# Patient Record
Sex: FEMALE | Race: WHITE | Employment: OTHER | URBAN - METROPOLITAN AREA
[De-identification: names, ages, dates, MRNs, and addresses within clinical notes are randomized per-mention and may not be internally consistent; named-entity substitution may affect disease eponyms.]

---

## 2017-05-24 ENCOUNTER — TELEPHONE (OUTPATIENT)
Dept: FAMILY MEDICINE CLINIC | Age: 67
End: 2017-05-24

## 2017-06-06 ENCOUNTER — OFFICE VISIT (OUTPATIENT)
Dept: FAMILY MEDICINE CLINIC | Age: 67
End: 2017-06-06

## 2017-06-06 VITALS
HEIGHT: 65 IN | BODY MASS INDEX: 30.66 KG/M2 | WEIGHT: 184 LBS | HEART RATE: 60 BPM | DIASTOLIC BLOOD PRESSURE: 78 MMHG | SYSTOLIC BLOOD PRESSURE: 136 MMHG

## 2017-06-06 DIAGNOSIS — E78.5 HYPERLIPIDEMIA, UNSPECIFIED HYPERLIPIDEMIA TYPE: ICD-10-CM

## 2017-06-06 DIAGNOSIS — L98.9 SKIN LESION: ICD-10-CM

## 2017-06-06 DIAGNOSIS — Z91.81 AT HIGH RISK FOR FALLS: ICD-10-CM

## 2017-06-06 DIAGNOSIS — I10 ESSENTIAL HYPERTENSION: Primary | ICD-10-CM

## 2017-06-06 PROCEDURE — 99213 OFFICE O/P EST LOW 20 MIN: CPT | Performed by: FAMILY MEDICINE

## 2017-06-06 RX ORDER — LOSARTAN POTASSIUM 50 MG/1
1 TABLET ORAL DAILY
COMMUNITY
Start: 2017-05-16

## 2017-06-06 RX ORDER — ATORVASTATIN CALCIUM 10 MG/1
10 TABLET, FILM COATED ORAL DAILY
Qty: 90 TABLET | Refills: 1 | Status: SHIPPED | OUTPATIENT
Start: 2017-06-06 | End: 2017-12-06 | Stop reason: SDUPTHER

## 2017-06-06 RX ORDER — ATENOLOL 50 MG/1
50 TABLET ORAL DAILY
Qty: 90 TABLET | Refills: 1 | Status: SHIPPED | OUTPATIENT
Start: 2017-06-06 | End: 2017-12-06 | Stop reason: SDUPTHER

## 2017-06-06 ASSESSMENT — ENCOUNTER SYMPTOMS
SHORTNESS OF BREATH: 0
CHEST TIGHTNESS: 0

## 2017-06-07 ENCOUNTER — OFFICE VISIT (OUTPATIENT)
Dept: PHYSICAL MEDICINE AND REHAB | Age: 67
End: 2017-06-07

## 2017-06-07 DIAGNOSIS — S46.011A TRAUMATIC TEAR OF RIGHT ROTATOR CUFF, INITIAL ENCOUNTER: Primary | ICD-10-CM

## 2017-06-07 DIAGNOSIS — R20.2 PARESTHESIA AND PAIN OF BOTH UPPER EXTREMITIES: ICD-10-CM

## 2017-06-07 DIAGNOSIS — G56.03 BILATERAL CARPAL TUNNEL SYNDROME: ICD-10-CM

## 2017-06-07 DIAGNOSIS — M79.602 PARESTHESIA AND PAIN OF BOTH UPPER EXTREMITIES: ICD-10-CM

## 2017-06-07 DIAGNOSIS — M75.41 IMPINGEMENT SYNDROME OF RIGHT SHOULDER: ICD-10-CM

## 2017-06-07 DIAGNOSIS — M79.601 PARESTHESIA AND PAIN OF BOTH UPPER EXTREMITIES: ICD-10-CM

## 2017-06-07 PROCEDURE — 95886 MUSC TEST DONE W/N TEST COMP: CPT | Performed by: PHYSICAL MEDICINE & REHABILITATION

## 2017-06-07 PROCEDURE — 95909 NRV CNDJ TST 5-6 STUDIES: CPT | Performed by: PHYSICAL MEDICINE & REHABILITATION

## 2017-12-06 ENCOUNTER — OFFICE VISIT (OUTPATIENT)
Dept: FAMILY MEDICINE CLINIC | Age: 67
End: 2017-12-06

## 2017-12-06 VITALS
WEIGHT: 189 LBS | SYSTOLIC BLOOD PRESSURE: 110 MMHG | BODY MASS INDEX: 31.49 KG/M2 | HEIGHT: 65 IN | DIASTOLIC BLOOD PRESSURE: 74 MMHG | HEART RATE: 58 BPM

## 2017-12-06 DIAGNOSIS — I10 ESSENTIAL HYPERTENSION: Primary | ICD-10-CM

## 2017-12-06 DIAGNOSIS — R07.9 CHEST PAIN, UNSPECIFIED TYPE: ICD-10-CM

## 2017-12-06 DIAGNOSIS — Z12.39 SCREENING FOR BREAST CANCER: ICD-10-CM

## 2017-12-06 DIAGNOSIS — E78.5 HYPERLIPIDEMIA, UNSPECIFIED HYPERLIPIDEMIA TYPE: ICD-10-CM

## 2017-12-06 DIAGNOSIS — E66.9 OBESITY (BMI 30.0-34.9): ICD-10-CM

## 2017-12-06 PROBLEM — E66.811 OBESITY (BMI 30.0-34.9): Status: ACTIVE | Noted: 2017-12-06

## 2017-12-06 LAB
A/G RATIO: 2 (ref 1.1–2.2)
ALBUMIN SERPL-MCNC: 4.3 G/DL (ref 3.4–5)
ALP BLD-CCNC: 69 U/L (ref 40–129)
ALT SERPL-CCNC: 31 U/L (ref 10–40)
ANION GAP SERPL CALCULATED.3IONS-SCNC: 12 MMOL/L (ref 3–16)
AST SERPL-CCNC: 26 U/L (ref 15–37)
BILIRUB SERPL-MCNC: 1.1 MG/DL (ref 0–1)
BILIRUBIN URINE: NEGATIVE
BLOOD, URINE: NEGATIVE
BUN BLDV-MCNC: 14 MG/DL (ref 7–20)
CALCIUM SERPL-MCNC: 9.6 MG/DL (ref 8.3–10.6)
CHLORIDE BLD-SCNC: 103 MMOL/L (ref 99–110)
CHOLESTEROL, TOTAL: 183 MG/DL (ref 0–199)
CLARITY: CLEAR
CO2: 28 MMOL/L (ref 21–32)
COLOR: YELLOW
CREAT SERPL-MCNC: 0.8 MG/DL (ref 0.6–1.2)
GFR AFRICAN AMERICAN: >60
GFR NON-AFRICAN AMERICAN: >60
GLOBULIN: 2.2 G/DL
GLUCOSE BLD-MCNC: 101 MG/DL (ref 70–99)
GLUCOSE URINE: NEGATIVE MG/DL
HDLC SERPL-MCNC: 60 MG/DL (ref 40–60)
KETONES, URINE: NEGATIVE MG/DL
LDL CHOLESTEROL CALCULATED: 90 MG/DL
LEUKOCYTE ESTERASE, URINE: NEGATIVE
MICROSCOPIC EXAMINATION: NORMAL
NITRITE, URINE: NEGATIVE
PH UA: 7
POTASSIUM SERPL-SCNC: 4.5 MMOL/L (ref 3.5–5.1)
PROTEIN UA: NEGATIVE MG/DL
SODIUM BLD-SCNC: 143 MMOL/L (ref 136–145)
SPECIFIC GRAVITY UA: 1.02
TOTAL PROTEIN: 6.5 G/DL (ref 6.4–8.2)
TRIGL SERPL-MCNC: 167 MG/DL (ref 0–150)
URINE TYPE: NORMAL
UROBILINOGEN, URINE: 0.2 E.U./DL
VLDLC SERPL CALC-MCNC: 33 MG/DL

## 2017-12-06 PROCEDURE — 99214 OFFICE O/P EST MOD 30 MIN: CPT | Performed by: FAMILY MEDICINE

## 2017-12-06 PROCEDURE — 36415 COLL VENOUS BLD VENIPUNCTURE: CPT | Performed by: FAMILY MEDICINE

## 2017-12-06 PROCEDURE — 3288F FALL RISK ASSESSMENT DOCD: CPT | Performed by: FAMILY MEDICINE

## 2017-12-06 PROCEDURE — 93000 ELECTROCARDIOGRAM COMPLETE: CPT | Performed by: FAMILY MEDICINE

## 2017-12-06 PROCEDURE — 81003 URINALYSIS AUTO W/O SCOPE: CPT | Performed by: FAMILY MEDICINE

## 2017-12-06 PROCEDURE — G8510 SCR DEP NEG, NO PLAN REQD: HCPCS | Performed by: FAMILY MEDICINE

## 2017-12-06 RX ORDER — ATORVASTATIN CALCIUM 10 MG/1
10 TABLET, FILM COATED ORAL DAILY
Qty: 90 TABLET | Refills: 1 | Status: SHIPPED | OUTPATIENT
Start: 2017-12-06 | End: 2018-08-09 | Stop reason: SDUPTHER

## 2017-12-06 RX ORDER — ATENOLOL 50 MG/1
50 TABLET ORAL DAILY
Qty: 90 TABLET | Refills: 1 | Status: SHIPPED | OUTPATIENT
Start: 2017-12-06 | End: 2018-06-28 | Stop reason: DRUGHIGH

## 2017-12-06 RX ORDER — NYSTATIN 100000 [USP'U]/G
POWDER TOPICAL
COMMUNITY
Start: 2017-12-01 | End: 2018-05-30 | Stop reason: ALTCHOICE

## 2017-12-06 RX ORDER — CLOBETASOL PROPIONATE 0.5 MG/G
AEROSOL, FOAM TOPICAL
COMMUNITY
Start: 2017-12-01

## 2017-12-06 RX ORDER — KETOCONAZOLE 20 MG/ML
SHAMPOO TOPICAL
COMMUNITY
Start: 2017-12-01

## 2017-12-06 ASSESSMENT — ENCOUNTER SYMPTOMS
SHORTNESS OF BREATH: 0
CHEST TIGHTNESS: 0

## 2017-12-06 ASSESSMENT — PATIENT HEALTH QUESTIONNAIRE - PHQ9
SUM OF ALL RESPONSES TO PHQ QUESTIONS 1-9: 0
1. LITTLE INTEREST OR PLEASURE IN DOING THINGS: 0
SUM OF ALL RESPONSES TO PHQ9 QUESTIONS 1 & 2: 0
2. FEELING DOWN, DEPRESSED OR HOPELESS: 0

## 2017-12-06 NOTE — PROGRESS NOTES
deviation present. No thyromegaly present. Cardiovascular: Normal rate, regular rhythm and normal heart sounds. Pulmonary/Chest: Effort normal and breath sounds normal. No respiratory distress. She has no wheezes. Abdominal: Soft. She exhibits no distension and no mass. There is no tenderness. Musculoskeletal: She exhibits no edema. Right lower leg: She exhibits no edema. Left lower leg: She exhibits no edema. Neurological: She is alert. Psychiatric: She has a normal mood and affect. Vitals:    12/06/17 0815   BP: 110/74   Site: Left Arm   Position: Sitting   Cuff Size: Large Adult   Pulse: 58   Weight: 189 lb (85.7 kg)   Height: 5' 4.5\" (1.638 m)     Body mass index is 31.94 kg/m². Wt Readings from Last 3 Encounters:   12/06/17 189 lb (85.7 kg)   06/06/17 184 lb (83.5 kg)   12/13/16 183 lb 6.4 oz (83.2 kg)     BP Readings from Last 3 Encounters:   12/06/17 110/74   06/06/17 136/78   12/13/16 120/68      The 10-year ASCVD risk score (Emiliana Caballero, et al., 2013) is: 6.8%    Values used to calculate the score:      Age: 75 years      Sex: Female      Is Non- : No      Diabetic: No      Tobacco smoker: No      Systolic Blood Pressure: 742 mmHg      Is BP treated: Yes      HDL Cholesterol: 73 mg/dL      Total Cholesterol: 179 mg/dL  Lab Review   No visits with results within 6 Month(s) from this visit. Latest known visit with results is:   Orders Only on 12/13/2016   Component Date Value    Hyaline Casts, UA 12/13/2016 2     WBC, UA 12/13/2016 4     RBC, UA 12/13/2016 2     Epi Cells 12/13/2016 1      No results found for this visit on 12/06/17. EKG: not change    Assessment:      1. Essential hypertension  COMPREHENSIVE METABOLIC PANEL    URINALYSIS    EKG 12 lead    atenolol (TENORMIN) 50 MG tablet   2. Hyperlipidemia, unspecified hyperlipidemia type  LIPID PANEL    atorvastatin (LIPITOR) 10 MG tablet   3.  Screening for breast cancer  MARYANN Screening

## 2017-12-08 ENCOUNTER — PATIENT MESSAGE (OUTPATIENT)
Dept: FAMILY MEDICINE CLINIC | Age: 67
End: 2017-12-08

## 2018-01-08 ENCOUNTER — HOSPITAL ENCOUNTER (OUTPATIENT)
Dept: WOMENS IMAGING | Age: 68
Discharge: HOME | End: 2018-01-08
Attending: FAMILY MEDICINE | Admitting: FAMILY MEDICINE

## 2018-01-08 DIAGNOSIS — Z12.39 SCREENING FOR BREAST CANCER: ICD-10-CM

## 2018-01-08 DIAGNOSIS — Z12.31 ENCOUNTER FOR SCREENING MAMMOGRAM FOR MALIGNANT NEOPLASM OF BREAST: ICD-10-CM

## 2018-05-30 ENCOUNTER — OFFICE VISIT (OUTPATIENT)
Dept: FAMILY MEDICINE CLINIC | Age: 68
End: 2018-05-30

## 2018-05-30 VITALS
DIASTOLIC BLOOD PRESSURE: 60 MMHG | HEIGHT: 64 IN | WEIGHT: 186.2 LBS | BODY MASS INDEX: 31.79 KG/M2 | SYSTOLIC BLOOD PRESSURE: 120 MMHG | HEART RATE: 53 BPM

## 2018-05-30 DIAGNOSIS — Z78.0 POST-MENOPAUSAL: ICD-10-CM

## 2018-05-30 DIAGNOSIS — I10 ESSENTIAL HYPERTENSION: Primary | ICD-10-CM

## 2018-05-30 DIAGNOSIS — E66.9 OBESITY (BMI 30.0-34.9): ICD-10-CM

## 2018-05-30 DIAGNOSIS — D49.2 ATYPICAL SQUAMOPROLIFERATIVE SKIN LESION: ICD-10-CM

## 2018-05-30 DIAGNOSIS — E78.5 HYPERLIPIDEMIA, UNSPECIFIED HYPERLIPIDEMIA TYPE: ICD-10-CM

## 2018-05-30 PROCEDURE — 99213 OFFICE O/P EST LOW 20 MIN: CPT | Performed by: FAMILY MEDICINE

## 2018-05-30 ASSESSMENT — ENCOUNTER SYMPTOMS
CHEST TIGHTNESS: 0
SHORTNESS OF BREATH: 0

## 2018-06-05 ENCOUNTER — HOSPITAL ENCOUNTER (OUTPATIENT)
Dept: WOMENS IMAGING | Age: 68
Discharge: HOME | End: 2018-06-05
Attending: FAMILY MEDICINE | Admitting: FAMILY MEDICINE

## 2018-06-05 DIAGNOSIS — Z78.0 ASYMPTOMATIC MENOPAUSAL STATE: ICD-10-CM

## 2018-06-05 DIAGNOSIS — Z78.0 POST-MENOPAUSAL: ICD-10-CM

## 2018-06-27 PROBLEM — M85.89 OSTEOPENIA OF MULTIPLE SITES: Status: ACTIVE | Noted: 2018-06-27

## 2018-06-28 ENCOUNTER — OFFICE VISIT (OUTPATIENT)
Dept: FAMILY MEDICINE CLINIC | Age: 68
End: 2018-06-28

## 2018-06-28 VITALS
HEIGHT: 64 IN | BODY MASS INDEX: 31.96 KG/M2 | HEART RATE: 63 BPM | SYSTOLIC BLOOD PRESSURE: 144 MMHG | WEIGHT: 187.2 LBS | DIASTOLIC BLOOD PRESSURE: 68 MMHG

## 2018-06-28 DIAGNOSIS — M85.89 OSTEOPENIA OF MULTIPLE SITES: Primary | ICD-10-CM

## 2018-06-28 DIAGNOSIS — I10 ESSENTIAL HYPERTENSION: ICD-10-CM

## 2018-06-28 PROCEDURE — 99213 OFFICE O/P EST LOW 20 MIN: CPT | Performed by: FAMILY MEDICINE

## 2018-06-28 RX ORDER — ATENOLOL 100 MG/1
100 TABLET ORAL DAILY
Qty: 30 TABLET | Refills: 11 | Status: SHIPPED | OUTPATIENT
Start: 2018-06-28 | End: 2019-02-07 | Stop reason: SDUPTHER

## 2018-06-28 ASSESSMENT — ENCOUNTER SYMPTOMS
CHEST TIGHTNESS: 0
SHORTNESS OF BREATH: 0

## 2018-08-09 ENCOUNTER — OFFICE VISIT (OUTPATIENT)
Dept: FAMILY MEDICINE CLINIC | Age: 68
End: 2018-08-09

## 2018-08-09 VITALS
WEIGHT: 188.4 LBS | HEIGHT: 64 IN | HEART RATE: 55 BPM | BODY MASS INDEX: 32.17 KG/M2 | SYSTOLIC BLOOD PRESSURE: 110 MMHG | DIASTOLIC BLOOD PRESSURE: 70 MMHG

## 2018-08-09 DIAGNOSIS — I10 ESSENTIAL HYPERTENSION: Primary | ICD-10-CM

## 2018-08-09 DIAGNOSIS — I25.10 CORONARY ARTERY DISEASE INVOLVING NATIVE CORONARY ARTERY OF NATIVE HEART WITHOUT ANGINA PECTORIS: ICD-10-CM

## 2018-08-09 DIAGNOSIS — E78.5 HYPERLIPIDEMIA, UNSPECIFIED HYPERLIPIDEMIA TYPE: ICD-10-CM

## 2018-08-09 DIAGNOSIS — E66.9 OBESITY (BMI 30.0-34.9): ICD-10-CM

## 2018-08-09 PROCEDURE — 99213 OFFICE O/P EST LOW 20 MIN: CPT | Performed by: FAMILY MEDICINE

## 2018-08-09 RX ORDER — ATORVASTATIN CALCIUM 10 MG/1
10 TABLET, FILM COATED ORAL DAILY
Qty: 90 TABLET | Refills: 3 | Status: SHIPPED | OUTPATIENT
Start: 2018-08-09 | End: 2019-08-07 | Stop reason: SDUPTHER

## 2018-08-09 RX ORDER — NITROGLYCERIN 0.3 MG/1
0.3 TABLET SUBLINGUAL EVERY 5 MIN PRN
Qty: 25 TABLET | Refills: 0 | Status: SHIPPED | OUTPATIENT
Start: 2018-08-09 | End: 2022-07-14 | Stop reason: SDUPTHER

## 2018-08-09 ASSESSMENT — ENCOUNTER SYMPTOMS: SHORTNESS OF BREATH: 0

## 2018-08-09 NOTE — PROGRESS NOTES
Relation Age of Onset    Stroke Father     Coronary Art Dis Father     Heart Attack Brother 72       Current Outpatient Prescriptions on File Prior to Visit   Medication Sig Dispense Refill    Calcium Carb-Cholecalciferol (CALTRATE 600+D) 600-800 MG-UNIT TABS 1 tab twice per day 60 tablet 11    atenolol (TENORMIN) 100 MG tablet Take 1 tablet by mouth daily 30 tablet 11    ciclopirox (LOPROX) 0.77 % cream       clobetasol (OLUX) 0.05 % foam       ketoconazole (NIZORAL) 2 % shampoo       losartan (COZAAR) 50 MG tablet Take 1 tablet by mouth daily      aspirin 81 MG tablet Take 81 mg by mouth daily.  Multiple Vitamin (MULTIVITAMIN PO) Take  by mouth daily. Current Facility-Administered Medications on File Prior to Visit   Medication Dose Route Frequency Provider Last Rate Last Dose    promethazine (PHENERGAN) injection 25 mg  25 mg Intramuscular Q6H PRN Ryan Chang MD   25 mg at 12/17/14 1156                   Objective:   Physical Exam   Constitutional: She appears well-developed and well-nourished. Obese   HENT:   Head: Normocephalic and atraumatic. Neck: Neck supple. Cardiovascular: Normal rate, regular rhythm and normal heart sounds. Pulmonary/Chest: Effort normal and breath sounds normal. No respiratory distress. She has no wheezes. Neurological: She is alert. Psychiatric: She has a normal mood and affect. Vitals:    08/09/18 0956   BP: 110/70   Site: Left Arm   Position: Sitting   Cuff Size: Large Adult   Pulse: 55   Weight: 188 lb 6.4 oz (85.5 kg)   Height: 5' 4\" (1.626 m)     Body mass index is 32.34 kg/m². Wt Readings from Last 3 Encounters:   08/09/18 188 lb 6.4 oz (85.5 kg)   06/28/18 187 lb 3.2 oz (84.9 kg)   05/30/18 186 lb 3.2 oz (84.5 kg)     BP Readings from Last 3 Encounters:   08/09/18 110/70   06/28/18 (!) 144/68   05/30/18 120/60          No results found for this visit on 08/09/18.   The 10-year ASCVD risk score (Ro Nava., et al., 2013) is: 7.2% Values used to calculate the score:      Age: 75 years      Sex: Female      Is Non- : No      Diabetic: No      Tobacco smoker: No      Systolic Blood Pressure: 735 mmHg      Is BP treated: Yes      HDL Cholesterol: 60 mg/dL      Total Cholesterol: 183 mg/dL  Lab Review   No visits with results within 6 Month(s) from this visit. Latest known visit with results is:   Office Visit on 12/06/2017   Component Date Value    Sodium 12/06/2017 143     Potassium 12/06/2017 4.5     Chloride 12/06/2017 103     CO2 12/06/2017 28     Anion Gap 12/06/2017 12     Glucose 12/06/2017 101*    BUN 12/06/2017 14     CREATININE 12/06/2017 0.8     GFR Non- 12/06/2017 >60     GFR  12/06/2017 >60     Calcium 12/06/2017 9.6     Total Protein 12/06/2017 6.5     Alb 12/06/2017 4.3     Albumin/Globulin Ratio 12/06/2017 2.0     Total Bilirubin 12/06/2017 1.1*    Alkaline Phosphatase 12/06/2017 69     ALT 12/06/2017 31     AST 12/06/2017 26     Globulin 12/06/2017 2.2     Cholesterol, Total 12/06/2017 183     Triglycerides 12/06/2017 167*    HDL 12/06/2017 60     LDL Calculated 12/06/2017 90     VLDL Cholesterol Calcula* 12/06/2017 33     Color, UA 12/06/2017 YELLOW     Clarity, UA 12/06/2017 Clear     Glucose, Ur 12/06/2017 Negative     Bilirubin Urine 12/06/2017 Negative     Ketones, Urine 12/06/2017 Negative     Specific Gravity, UA 12/06/2017 1.018     Blood, Urine 12/06/2017 Negative     pH, UA 12/06/2017 7.0     Protein, UA 12/06/2017 Negative     Urobilinogen, Urine 12/06/2017 0.2     Nitrite, Urine 12/06/2017 Negative     Leukocyte Esterase, Urine 12/06/2017 Negative     Microscopic Examination 12/06/2017 Not Indicated     Urine Type 12/06/2017 Clean catch            Assessment:       Diagnosis Orders   1. Essential hypertension     2. Hyperlipidemia, unspecified hyperlipidemia type  atorvastatin (LIPITOR) 10 MG tablet   3.  Obesity (BMI

## 2018-08-27 ENCOUNTER — HOSPITAL ENCOUNTER (OUTPATIENT)
Dept: CT IMAGING | Age: 68
Discharge: HOME | End: 2018-08-27
Attending: FAMILY MEDICINE | Admitting: FAMILY MEDICINE

## 2018-08-27 ENCOUNTER — OFFICE VISIT (OUTPATIENT)
Dept: FAMILY MEDICINE CLINIC | Age: 68
End: 2018-08-27

## 2018-08-27 VITALS
DIASTOLIC BLOOD PRESSURE: 84 MMHG | SYSTOLIC BLOOD PRESSURE: 122 MMHG | WEIGHT: 185.8 LBS | HEART RATE: 62 BPM | BODY MASS INDEX: 31.89 KG/M2

## 2018-08-27 DIAGNOSIS — R07.89 CHEST TIGHTNESS: Primary | ICD-10-CM

## 2018-08-27 DIAGNOSIS — K92.1 BLOOD IN STOOL: ICD-10-CM

## 2018-08-27 DIAGNOSIS — R10.9 ABDOMINAL PAIN, RIGHT LATERAL: ICD-10-CM

## 2018-08-27 DIAGNOSIS — R07.89 OTHER CHEST PAIN: ICD-10-CM

## 2018-08-27 DIAGNOSIS — R10.9 STOMACH ACHE: ICD-10-CM

## 2018-08-27 DIAGNOSIS — R21 RASH: ICD-10-CM

## 2018-08-27 LAB
ALBUMIN SERPL-MCNC: 4.1 GM/DL (ref 3.4–5)
ALP BLD-CCNC: 65 IU/L (ref 40–128)
ALT SERPL-CCNC: 37 U/L (ref 10–40)
AMYLASE: 55 U/L (ref 25–115)
ANION GAP SERPL CALCULATED.3IONS-SCNC: 14 MMOL/L (ref 4–16)
AST SERPL-CCNC: 32 IU/L (ref 15–37)
BASOPHILS ABSOLUTE: 0.1 K/CU MM
BASOPHILS RELATIVE PERCENT: 1.4 % (ref 0–1)
BILIRUB SERPL-MCNC: 1 MG/DL (ref 0–1)
BUN BLDV-MCNC: 17 MG/DL (ref 6–23)
CALCIUM SERPL-MCNC: 10.2 MG/DL (ref 8.3–10.6)
CHLORIDE BLD-SCNC: 97 MMOL/L (ref 99–110)
CO2: 27 MMOL/L (ref 21–32)
CREAT SERPL-MCNC: 0.9 MG/DL (ref 0.6–1.1)
DIFFERENTIAL TYPE: ABNORMAL
EOSINOPHILS ABSOLUTE: 0.3 K/CU MM
EOSINOPHILS RELATIVE PERCENT: 3.1 % (ref 0–3)
GFR AFRICAN AMERICAN: >60 ML/MIN/1.73M2
GFR AFRICAN AMERICAN: >60 ML/MIN/1.73M2
GFR NON-AFRICAN AMERICAN: 52 ML/MIN/1.73M2
GFR NON-AFRICAN AMERICAN: >60 ML/MIN/1.73M2
GLUCOSE BLD-MCNC: 80 MG/DL (ref 70–99)
HCT VFR BLD CALC: 39.9 % (ref 37–47)
HEMOGLOBIN: 13.1 GM/DL (ref 12.5–16)
IMMATURE NEUTROPHIL %: 0.4 % (ref 0–0.43)
LIPASE: 54 IU/L (ref 13–60)
LYMPHOCYTES ABSOLUTE: 2.6 K/CU MM
LYMPHOCYTES RELATIVE PERCENT: 27.7 % (ref 24–44)
MCH RBC QN AUTO: 31.6 PG (ref 27–31)
MCHC RBC AUTO-ENTMCNC: 32.8 % (ref 32–36)
MCV RBC AUTO: 96.4 FL (ref 78–100)
MONOCYTES ABSOLUTE: 0.7 K/CU MM
MONOCYTES RELATIVE PERCENT: 8 % (ref 0–4)
NUCLEATED RBC %: 0 %
PDW BLD-RTO: 13.9 % (ref 11.7–14.9)
PLATELET # BLD: 322 K/CU MM (ref 140–440)
PMV BLD AUTO: 10.2 FL (ref 7.5–11.1)
POC CREATININE: 1.1 MG/DL (ref 0.6–1.1)
POTASSIUM SERPL-SCNC: 4.5 MMOL/L (ref 3.5–5.1)
RBC # BLD: 4.14 M/CU MM (ref 4.2–5.4)
SEGMENTED NEUTROPHILS ABSOLUTE COUNT: 5.5 K/CU MM
SEGMENTED NEUTROPHILS RELATIVE PERCENT: 59.4 % (ref 36–66)
SODIUM BLD-SCNC: 138 MMOL/L (ref 135–145)
TOTAL IMMATURE NEUTOROPHIL: 0.04 K/CU MM
TOTAL NUCLEATED RBC: 0 K/CU MM
TOTAL PROTEIN: 6.2 GM/DL (ref 6.4–8.2)
WBC # BLD: 9.3 K/CU MM (ref 4–10.5)

## 2018-08-27 PROCEDURE — 99214 OFFICE O/P EST MOD 30 MIN: CPT | Performed by: FAMILY MEDICINE

## 2018-08-27 PROCEDURE — 93000 ELECTROCARDIOGRAM COMPLETE: CPT | Performed by: FAMILY MEDICINE

## 2018-08-27 RX ORDER — SODIUM CHLORIDE 0.9 % (FLUSH) 0.9 %
10 SYRINGE (ML) INJECTION ONCE
Status: COMPLETED | OUTPATIENT
Start: 2018-08-27 | End: 2018-08-27

## 2018-08-27 RX ORDER — SODIUM CHLORIDE 9 MG/ML
INJECTION, SOLUTION INTRAVENOUS CONTINUOUS
Status: DISCONTINUED | OUTPATIENT
Start: 2018-08-27 | End: 2018-08-27

## 2018-08-27 RX ADMIN — Medication 10 ML: at 14:36

## 2018-08-27 ASSESSMENT — ENCOUNTER SYMPTOMS
SHORTNESS OF BREATH: 0
ABDOMINAL PAIN: 1
BLOOD IN STOOL: 1

## 2018-08-27 NOTE — PROGRESS NOTES
daily 90 tablet 3    Calcium Carb-Cholecalciferol (CALTRATE 600+D) 600-800 MG-UNIT TABS 1 tab twice per day 60 tablet 11    atenolol (TENORMIN) 100 MG tablet Take 1 tablet by mouth daily 30 tablet 11    ciclopirox (LOPROX) 0.77 % cream       clobetasol (OLUX) 0.05 % foam       ketoconazole (NIZORAL) 2 % shampoo       losartan (COZAAR) 50 MG tablet Take 1 tablet by mouth daily      aspirin 81 MG tablet Take 81 mg by mouth daily.  Multiple Vitamin (MULTIVITAMIN PO) Take  by mouth daily. Current Facility-Administered Medications on File Prior to Visit   Medication Dose Route Frequency Provider Last Rate Last Dose    promethazine (PHENERGAN) injection 25 mg  25 mg Intramuscular Q6H PRN Susan Alatorre MD   25 mg at 12/17/14 1156                   Objective:   Physical Exam   Constitutional: She appears well-developed and well-nourished. Obese   HENT:   Head: Normocephalic and atraumatic. Neck: Neck supple. No tracheal deviation present. No thyromegaly present. Cardiovascular: Normal rate, regular rhythm and normal heart sounds. Pulmonary/Chest: Effort normal and breath sounds normal. No respiratory distress. She has no wheezes. Abdominal: Soft. She exhibits no distension and no mass. There is tenderness. Musculoskeletal: She exhibits no edema. Neurological: She is alert. Skin:        Psychiatric: She has a normal mood and affect. Vitals:    08/27/18 1022   BP: 122/84   Pulse: 62   Weight: 185 lb 12.8 oz (84.3 kg)     Body mass index is 31.89 kg/m². Wt Readings from Last 3 Encounters:   08/27/18 185 lb 12.8 oz (84.3 kg)   08/09/18 188 lb 6.4 oz (85.5 kg)   06/28/18 187 lb 3.2 oz (84.9 kg)     BP Readings from Last 3 Encounters:   08/27/18 122/84   08/09/18 110/70   06/28/18 (!) 144/68          No results found for this visit on 08/27/18.   The 10-year ASCVD risk score (Idalmis Tao et al., 2013) is: 8.8%    Values used to calculate the score:      Age: 75 years      Sex: CONTRAST    CT ABDOMEN PELVIS W WO CONTRAST Additional Contrast? Radiologist Recommendation    Creatinine    COMPREHENSIVE METABOLIC PANEL    AMYLASE    LIPASE    CBC   3. Blood in stool  CT CHEST W WO CONTRAST    CT ABDOMEN PELVIS W WO CONTRAST Additional Contrast? Radiologist Recommendation    COMPREHENSIVE METABOLIC PANEL    LIPASE    CBC   4.  Rash             Plan:      Could be early shingles, but rash not distinct enough to be labeled as such    Blood in stool could be from hemorrhoids as patient reports    RE-check in 2 days

## 2018-08-28 ENCOUNTER — TELEPHONE (OUTPATIENT)
Dept: FAMILY MEDICINE CLINIC | Age: 68
End: 2018-08-28

## 2018-08-29 ENCOUNTER — OFFICE VISIT (OUTPATIENT)
Dept: FAMILY MEDICINE CLINIC | Age: 68
End: 2018-08-29

## 2018-08-29 VITALS
DIASTOLIC BLOOD PRESSURE: 70 MMHG | WEIGHT: 185 LBS | SYSTOLIC BLOOD PRESSURE: 112 MMHG | BODY MASS INDEX: 31.76 KG/M2 | HEART RATE: 62 BPM

## 2018-08-29 DIAGNOSIS — B02.9 HERPES ZOSTER WITHOUT COMPLICATION: Primary | ICD-10-CM

## 2018-08-29 DIAGNOSIS — R10.11 RIGHT UPPER QUADRANT ABDOMINAL PAIN: ICD-10-CM

## 2018-08-29 PROCEDURE — 99213 OFFICE O/P EST LOW 20 MIN: CPT | Performed by: FAMILY MEDICINE

## 2018-08-29 RX ORDER — GABAPENTIN 100 MG/1
100 CAPSULE ORAL 3 TIMES DAILY
Qty: 90 CAPSULE | Refills: 0 | Status: SHIPPED | OUTPATIENT
Start: 2018-08-29 | End: 2019-02-07 | Stop reason: ALTCHOICE

## 2018-08-29 NOTE — PROGRESS NOTES
Patient ID: Bossman Friedman [id number]        HPI Abdominal pain: Ongoing for about a week right side of abdomen going into the right chest and into the upper back. Sharp. Pain is severe. Not associated with eating. the stools which she attributes to  Pain in her abdomen is a burning pain. It is sharp.     Rash: on right lower abdomen. Red. Burning. Radiates to the right back. There for a week. Did have a shingles vaccine years ago    Review of Systems   Constitutional: Positive for activity change. Gastrointestinal: Positive for abdominal pain. Skin: Positive for color change and rash. Patient Active Problem List   Diagnosis    Essential hypertension    Hyperlipidemia    History of colon polyps    Obesity (BMI 30.0-34. 9)    Osteopenia of multiple sites    Coronary artery disease involving native coronary artery of native heart without angina pectoris       Past Medical History:   Diagnosis Date    Colonic polyp     Hyperlipidemia 2009    Hypertension        Past Surgical History:   Procedure Laterality Date    CARDIOVASCULAR STRESS TEST  2009    treadmill, Dr. Liyah Mora      right     SECTION      COLONOSCOPY  2009 [facility]    COLONOSCOPY  2015    diverticulosis, internal hemorroids.  repeat 5 years. [place] Lama    ROTATOR CUFF REPAIR Right 2016    TUBAL LIGATION         Family History   Problem Relation Age of Onset    Stroke Father     Coronary Art Dis Father     Heart Attack Brother 72       Current Outpatient Prescriptions on File Prior to Visit   Medication Sig Dispense Refill    nitroGLYCERIN (NITROSTAT) 0.3 MG SL tablet Place 1 tablet under the tongue every 5 minutes as needed for Chest pain 25 tablet 0    atorvastatin (LIPITOR) 10 MG tablet Take 1 tablet by mouth daily 90 tablet 3    Calcium Carb-Cholecalciferol (CALTRATE 600+D) 600-800 MG-UNIT TABS 1 tab twice per day 60 tablet 11    atenolol (TENORMIN) 100 MG tablet Take 1 tablet by mouth daily 30 tablet 11    ciclopirox (LOPROX) 0.77 % cream       clobetasol (OLUX) 0.05 % foam       ketoconazole (NIZORAL) 2 % shampoo       losartan (COZAAR) 50 MG tablet Take 1 tablet by mouth daily      aspirin 81 MG tablet Take 81 mg by mouth daily.  Multiple Vitamin (MULTIVITAMIN PO) Take  by mouth daily. Current Facility-Administered Medications on File Prior to Visit   Medication Dose Route Frequency Provider Last Rate Last Dose    promethazine (PHENERGAN) injection 25 mg  25 mg Intramuscular Q6H PRN Kirke Mortimer, MD   25 mg at 12/17/14 1156                   Objective:   Physical Exam   Constitutional: She appears well-developed. No distress. Neurological:   No facial droop. Moving all extremities   Skin:        Psychiatric: Her mood appears anxious. Cognition and memory are normal.        1. No acute intrathoracic, abdominal or pelvic abnormality. 2. Nonobstructing bilateral renal calculi and bilateral renal cysts.    Lab Review   Hospital Outpatient Visit on 08/27/2018   Component Date Value    Amylase 08/27/2018 55     WBC 08/27/2018 9.3     RBC 08/27/2018 4.14*    Hemoglobin 08/27/2018 13.1     Hematocrit 08/27/2018 39.9     MCV 08/27/2018 96.4     MCH 08/27/2018 31.6*    MCHC 08/27/2018 32.8     RDW 08/27/2018 13.9     Platelets 88/13/2039 322     MPV 08/27/2018 10.2     Differential Type 08/27/2018 AUTOMATED DIFFERENTIAL     Segs Relative 08/27/2018 59.4     Lymphocytes % 08/27/2018 27.7     Monocytes % 08/27/2018 8.0*    Eosinophils % 08/27/2018 3.1*    Basophils % 08/27/2018 1.4*    Segs Absolute 08/27/2018 5.5     Lymphocytes # 08/27/2018 2.6     Monocytes # 08/27/2018 0.7     Eosinophils # 08/27/2018 0.3     Basophils # 08/27/2018 0.1     Nucleated RBC % 08/27/2018 0.0     Total Nucleated RBC 08/27/2018 0.0     Total Immature Neutrophil 08/27/2018 0.04     Immature Neutrophil % 08/27/2018 0.4     Sodium 08/27/2018 138     Potassium 08/27/2018 4.5     Chloride 08/27/2018 97*    CO2 08/27/2018 27     BUN 08/27/2018 17     CREATININE 08/27/2018 0.9     Glucose 08/27/2018 80     Calcium 08/27/2018 10.2     Alb 08/27/2018 4.1     Total Protein 08/27/2018 6.2*    Total Bilirubin 08/27/2018 1.0     ALT 08/27/2018 37     AST 08/27/2018 32     Alkaline Phosphatase 08/27/2018 65     GFR Non- 08/27/2018 >60     GFR  08/27/2018 >60     Anion Gap 08/27/2018 14     Lipase 08/27/2018 54     POC Creatinine 08/27/2018 1.1     GFR Non- 08/27/2018 52*    GFR  08/27/2018 >60          Vitals:    08/29/18 0822   BP: 112/70   Pulse: 62   Weight: 185 lb (83.9 kg)     Body mass index is 31.76 kg/m². Wt Readings from Last 3 Encounters:   08/29/18 185 lb (83.9 kg)   08/27/18 185 lb 12.8 oz (84.3 kg)   08/09/18 188 lb 6.4 oz (85.5 kg)     BP Readings from Last 3 Encounters:   08/29/18 112/70   08/27/18 122/84   08/09/18 110/70          No results found for this visit on 08/29/18. Assessment:       Diagnosis Orders   1. Herpes zoster without complication  gabapentin (NEURONTIN) 100 MG capsule   2. Right upper quadrant abdominal pain               Plan: Will be contagious until she has crusted over. Avoid contact with small infants.

## 2018-08-29 NOTE — PATIENT INSTRUCTIONS
Patient Education        Shingles: Care Instructions  Your Care Instructions    Shingles (herpes zoster) causes pain and a blistered rash. The rash can appear anywhere on the body but will be on only one side of the body, the left or right. It will be in a band, a strip, or a small area. The pain can be very severe. Shingles can also cause tingling or itching in the area of the rash. The blisters scab over after a few days and heal in 2 to 4 weeks. Medicines can help you feel better and may help prevent more serious problems caused by shingles. Shingles is caused by the same virus that causes chickenpox. When you have chickenpox, the virus gets into your nerve roots and stays there (becomes dormant) long after you get over the chickenpox. If the virus becomes active again, it can cause shingles. Follow-up care is a key part of your treatment and safety. Be sure to make and go to all appointments, and call your doctor if you are having problems. It's also a good idea to know your test results and keep a list of the medicines you take. How can you care for yourself at home? · Be safe with medicines. Take your medicines exactly as prescribed. Call your doctor if you think you are having a problem with your medicine. Antiviral medicine helps you get better faster. · Try not to scratch or pick at the blisters. They will crust over and fall off on their own if you leave them alone. · Put cool, wet cloths on the area to relieve pain and itching. You can also use calamine lotion. Try not to use so much lotion that it cakes and is hard to get off. · Put cornstarch or baking soda on the sores to help dry them out so they heal faster. · Do not use thick ointment, such as petroleum jelly, on the sores. This will keep them from drying and healing. · To help remove loose crusts, soak them in tap water. This can help decrease oozing, and dry and soothe the skin.   · Take an over-the-counter pain medicine, such as acetaminophen (Tylenol), ibuprofen (Advil, Motrin), or naproxen (Aleve). Read and follow all instructions on the label. · Avoid close contact with people until the blisters have healed. It is very important for you to avoid contact with anyone who has never had chickenpox or the chickenpox vaccine. Pregnant women, young babies, and anyone else who has a hard time fighting infection (such as someone with HIV, diabetes, or cancer) is especially at risk. When should you call for help? Call your doctor now or seek immediate medical care if:    · You have a new or higher fever.     · You have a severe headache and a stiff neck.     · You lose the ability to think clearly.     · The rash spreads to your forehead, nose, eyes, or eyelids.     · You have eye pain, or your vision gets worse.     · You have new pain in your face, or you cannot move the muscles in your face.     · Blisters spread to new parts of your body.    Watch closely for changes in your health, and be sure to contact your doctor if:    · The rash has not healed after 2 to 4 weeks.     · You still have pain after the rash has healed. Where can you learn more? Go to [email or web address] and sign in to your [employer] account. Loni Brooks in the [employer] box to learn more about \"Shingles: Care Instructions. \"     If you do not have an account, please click on the \"Sign Up Now\" link. Current as of: November 18, 2017  Content Version: 11.7  © 0174-1327 [employer]. Care instructions adapted under license by [facility] ([place]). If you have questions about a medical condition or this instruction, always ask your healthcare professional. [address] any warranty or liability for your use of this information.

## 2018-08-30 ASSESSMENT — ENCOUNTER SYMPTOMS
ABDOMINAL PAIN: 1
COLOR CHANGE: 1

## 2018-10-08 ENCOUNTER — NURSE ONLY (OUTPATIENT)
Dept: FAMILY MEDICINE CLINIC | Age: 68
End: 2018-10-08
Payer: MEDICARE

## 2018-10-08 DIAGNOSIS — Z23 NEED FOR INFLUENZA VACCINATION: Primary | ICD-10-CM

## 2018-10-08 PROCEDURE — 90662 IIV NO PRSV INCREASED AG IM: CPT | Performed by: FAMILY MEDICINE

## 2018-10-08 PROCEDURE — G0008 ADMIN INFLUENZA VIRUS VAC: HCPCS | Performed by: FAMILY MEDICINE

## 2019-02-07 ENCOUNTER — OFFICE VISIT (OUTPATIENT)
Dept: FAMILY MEDICINE CLINIC | Age: 69
End: 2019-02-07
Payer: MEDICARE

## 2019-02-07 VITALS
SYSTOLIC BLOOD PRESSURE: 138 MMHG | HEIGHT: 64 IN | WEIGHT: 181.2 LBS | HEART RATE: 50 BPM | BODY MASS INDEX: 30.93 KG/M2 | DIASTOLIC BLOOD PRESSURE: 72 MMHG

## 2019-02-07 DIAGNOSIS — E66.9 OBESITY (BMI 30.0-34.9): ICD-10-CM

## 2019-02-07 DIAGNOSIS — I10 ESSENTIAL HYPERTENSION: ICD-10-CM

## 2019-02-07 DIAGNOSIS — M85.89 OSTEOPENIA OF MULTIPLE SITES: ICD-10-CM

## 2019-02-07 DIAGNOSIS — I25.10 CORONARY ARTERY DISEASE INVOLVING NATIVE CORONARY ARTERY OF NATIVE HEART WITHOUT ANGINA PECTORIS: Primary | ICD-10-CM

## 2019-02-07 LAB
A/G RATIO: 1.8 (ref 1.1–2.2)
ALBUMIN SERPL-MCNC: 4.8 G/DL (ref 3.4–5)
ALP BLD-CCNC: 58 U/L (ref 40–129)
ALT SERPL-CCNC: 31 U/L (ref 10–40)
ANION GAP SERPL CALCULATED.3IONS-SCNC: 15 MMOL/L (ref 3–16)
AST SERPL-CCNC: 31 U/L (ref 15–37)
BILIRUB SERPL-MCNC: 1.3 MG/DL (ref 0–1)
BILIRUBIN URINE: NEGATIVE
BLOOD, URINE: NEGATIVE
BUN BLDV-MCNC: 21 MG/DL (ref 7–20)
CALCIUM SERPL-MCNC: 10.2 MG/DL (ref 8.3–10.6)
CHLORIDE BLD-SCNC: 98 MMOL/L (ref 99–110)
CLARITY: CLEAR
CO2: 26 MMOL/L (ref 21–32)
COLOR: YELLOW
CREAT SERPL-MCNC: 0.9 MG/DL (ref 0.6–1.2)
GFR AFRICAN AMERICAN: >60
GFR NON-AFRICAN AMERICAN: >60
GLOBULIN: 2.6 G/DL
GLUCOSE BLD-MCNC: 94 MG/DL (ref 70–99)
GLUCOSE URINE: NEGATIVE MG/DL
KETONES, URINE: NEGATIVE MG/DL
LEUKOCYTE ESTERASE, URINE: NEGATIVE
MICROSCOPIC EXAMINATION: NORMAL
NITRITE, URINE: NEGATIVE
PH UA: 6
POTASSIUM SERPL-SCNC: 4.3 MMOL/L (ref 3.5–5.1)
PROTEIN UA: NEGATIVE MG/DL
SODIUM BLD-SCNC: 139 MMOL/L (ref 136–145)
SPECIFIC GRAVITY UA: 1.01
T4 FREE: 1 NG/DL (ref 0.9–1.8)
TOTAL PROTEIN: 7.4 G/DL (ref 6.4–8.2)
TSH REFLEX: 5.89 UIU/ML (ref 0.27–4.2)
URINE TYPE: NORMAL
UROBILINOGEN, URINE: 0.2 E.U./DL

## 2019-02-07 PROCEDURE — 36415 COLL VENOUS BLD VENIPUNCTURE: CPT | Performed by: FAMILY MEDICINE

## 2019-02-07 PROCEDURE — G8510 SCR DEP NEG, NO PLAN REQD: HCPCS | Performed by: FAMILY MEDICINE

## 2019-02-07 PROCEDURE — 3288F FALL RISK ASSESSMENT DOCD: CPT | Performed by: FAMILY MEDICINE

## 2019-02-07 PROCEDURE — 99213 OFFICE O/P EST LOW 20 MIN: CPT | Performed by: FAMILY MEDICINE

## 2019-02-07 PROCEDURE — 81003 URINALYSIS AUTO W/O SCOPE: CPT | Performed by: FAMILY MEDICINE

## 2019-02-07 RX ORDER — TRIAMCINOLONE ACETONIDE 1 MG/G
CREAM TOPICAL
COMMUNITY
Start: 2018-12-10 | End: 2022-02-11

## 2019-02-07 RX ORDER — ATENOLOL 100 MG/1
100 TABLET ORAL DAILY
Qty: 30 TABLET | Refills: 11 | Status: SHIPPED | OUTPATIENT
Start: 2019-02-07 | End: 2020-01-29 | Stop reason: SDUPTHER

## 2019-02-07 ASSESSMENT — PATIENT HEALTH QUESTIONNAIRE - PHQ9
SUM OF ALL RESPONSES TO PHQ QUESTIONS 1-9: 0
1. LITTLE INTEREST OR PLEASURE IN DOING THINGS: 0
SUM OF ALL RESPONSES TO PHQ9 QUESTIONS 1 & 2: 0
2. FEELING DOWN, DEPRESSED OR HOPELESS: 0
SUM OF ALL RESPONSES TO PHQ QUESTIONS 1-9: 0

## 2019-02-07 ASSESSMENT — ENCOUNTER SYMPTOMS
SHORTNESS OF BREATH: 0
CHEST TIGHTNESS: 0

## 2019-08-07 ENCOUNTER — OFFICE VISIT (OUTPATIENT)
Dept: FAMILY MEDICINE CLINIC | Age: 69
End: 2019-08-07
Payer: MEDICARE

## 2019-08-07 VITALS
DIASTOLIC BLOOD PRESSURE: 84 MMHG | SYSTOLIC BLOOD PRESSURE: 122 MMHG | HEART RATE: 61 BPM | WEIGHT: 176.4 LBS | BODY MASS INDEX: 30.11 KG/M2 | HEIGHT: 64 IN

## 2019-08-07 DIAGNOSIS — Z23 NEED FOR TETANUS BOOSTER: ICD-10-CM

## 2019-08-07 DIAGNOSIS — I25.10 CORONARY ARTERY DISEASE INVOLVING NATIVE CORONARY ARTERY OF NATIVE HEART WITHOUT ANGINA PECTORIS: ICD-10-CM

## 2019-08-07 DIAGNOSIS — I10 ESSENTIAL HYPERTENSION: Primary | ICD-10-CM

## 2019-08-07 DIAGNOSIS — R10.11 RUQ ABDOMINAL PAIN: ICD-10-CM

## 2019-08-07 DIAGNOSIS — E78.5 HYPERLIPIDEMIA, UNSPECIFIED HYPERLIPIDEMIA TYPE: ICD-10-CM

## 2019-08-07 PROCEDURE — 90715 TDAP VACCINE 7 YRS/> IM: CPT | Performed by: FAMILY MEDICINE

## 2019-08-07 PROCEDURE — 99213 OFFICE O/P EST LOW 20 MIN: CPT | Performed by: FAMILY MEDICINE

## 2019-08-07 PROCEDURE — 90471 IMMUNIZATION ADMIN: CPT | Performed by: FAMILY MEDICINE

## 2019-08-07 RX ORDER — ATORVASTATIN CALCIUM 10 MG/1
10 TABLET, FILM COATED ORAL DAILY
Qty: 90 TABLET | Refills: 3 | Status: SHIPPED | OUTPATIENT
Start: 2019-08-07 | End: 2020-07-21 | Stop reason: SDUPTHER

## 2019-08-07 RX ORDER — APREMILAST 30 MG/1
TABLET, FILM COATED ORAL
COMMUNITY
Start: 2019-08-01 | End: 2020-03-18

## 2019-08-07 ASSESSMENT — ENCOUNTER SYMPTOMS
CHEST TIGHTNESS: 0
SHORTNESS OF BREATH: 0
ABDOMINAL PAIN: 1

## 2019-08-07 NOTE — PROGRESS NOTES
Colonic polyp     Hyperlipidemia 2009    Hypertension        Past Surgical History:   Procedure Laterality Date    CARDIOVASCULAR STRESS TEST  2009    treadmill, Dr. Enriqueta Rubin      right     SECTION      COLONOSCOPY  2009 [facility]    COLONOSCOPY  2015    diverticulosis, internal hemorroids. repeat 5 years. Magali Quinones    ROTATOR CUFF REPAIR Right 2016    TUBAL LIGATION         Family History   Problem Relation Age of Onset    Stroke Father     Coronary Art Dis Father     Heart Attack Brother 72       Current Outpatient Medications on File Prior to Visit   Medication Sig Dispense Refill    OTEZLA 30 MG TABS       triamcinolone (KENALOG) 0.1 % cream       atenolol (TENORMIN) 100 MG tablet Take 1 tablet by mouth daily 30 tablet 11    Calcium Carb-Cholecalciferol (CALTRATE 600+D) 600-800 MG-UNIT TABS 1 tab twice per day 60 tablet 11    nitroGLYCERIN (NITROSTAT) 0.3 MG SL tablet Place 1 tablet under the tongue every 5 minutes as needed for Chest pain 25 tablet 0    clobetasol (OLUX) 0.05 % foam       ketoconazole (NIZORAL) 2 % shampoo       losartan (COZAAR) 50 MG tablet Take 1 tablet by mouth daily      aspirin 81 MG tablet Take 81 mg by mouth daily.  Multiple Vitamin (MULTIVITAMIN PO) Take  by mouth daily. Current Facility-Administered Medications on File Prior to Visit   Medication Dose Route Frequency Provider Last Rate Last Dose    promethazine (PHENERGAN) injection 25 mg  25 mg Intramuscular Q6H PRN Alexis Gillette MD   25 mg at 14 1156                   Objective:         Physical Exam   Constitutional: She appears well-developed and well-nourished. No distress. HENT:   Head: Normocephalic and atraumatic. Neck: Neck supple. No tracheal deviation present. No thyromegaly present. Cardiovascular: Normal rate, regular rhythm, S1 normal, S2 normal and normal heart sounds.    Pulmonary/Chest: Effort normal and breath sounds

## 2019-08-09 ENCOUNTER — HOSPITAL ENCOUNTER (OUTPATIENT)
Dept: ULTRASOUND IMAGING | Age: 69
Discharge: HOME | End: 2019-08-09
Payer: MEDICARE

## 2019-08-09 DIAGNOSIS — R10.11 RUQ ABDOMINAL PAIN: ICD-10-CM

## 2019-08-09 PROCEDURE — 76705 ECHO EXAM OF ABDOMEN: CPT

## 2019-08-12 PROBLEM — K76.0 HEPATIC STEATOSIS: Status: ACTIVE | Noted: 2019-08-12

## 2019-09-25 ENCOUNTER — IMMUNIZATION (OUTPATIENT)
Dept: FAMILY MEDICINE CLINIC | Age: 69
End: 2019-09-25
Payer: MEDICARE

## 2019-09-25 DIAGNOSIS — Z23 NEED FOR INFLUENZA VACCINATION: Primary | ICD-10-CM

## 2019-09-25 PROCEDURE — G0008 ADMIN INFLUENZA VIRUS VAC: HCPCS | Performed by: FAMILY MEDICINE

## 2019-09-25 PROCEDURE — 90653 IIV ADJUVANT VACCINE IM: CPT | Performed by: FAMILY MEDICINE

## 2019-09-25 NOTE — PROGRESS NOTES
Vaccine Information Sheet, \"Influenza - Inactivated\"  given to Ival Ape, or parent/legal guardian of  Ival Ape and verbalized understanding. Patient responses:    Have you ever had a reaction to a flu vaccine? No  Do you have any current illness? No  Have you ever had Guillian Starke Syndrome? No  Do you have a serious allergy to any of the follow: Neomycin, Polymyxin, Thimerosal, eggs or egg products? No    Flu vaccine given per order. Please see immunization tab. Risks and benefits explained. Current VIS given.

## 2020-01-29 ENCOUNTER — OFFICE VISIT (OUTPATIENT)
Dept: FAMILY MEDICINE CLINIC | Age: 70
End: 2020-01-29
Payer: MEDICARE

## 2020-01-29 VITALS
DIASTOLIC BLOOD PRESSURE: 76 MMHG | BODY MASS INDEX: 30.39 KG/M2 | HEART RATE: 65 BPM | SYSTOLIC BLOOD PRESSURE: 122 MMHG | WEIGHT: 178 LBS | HEIGHT: 64 IN

## 2020-01-29 LAB
A/G RATIO: 1.9 (ref 1.1–2.2)
ALBUMIN SERPL-MCNC: 4.4 G/DL (ref 3.4–5)
ALP BLD-CCNC: 53 U/L (ref 40–129)
ALT SERPL-CCNC: 27 U/L (ref 10–40)
ANION GAP SERPL CALCULATED.3IONS-SCNC: 15 MMOL/L (ref 3–16)
AST SERPL-CCNC: 28 U/L (ref 15–37)
BILIRUB SERPL-MCNC: 0.9 MG/DL (ref 0–1)
BUN BLDV-MCNC: 21 MG/DL (ref 7–20)
CALCIUM SERPL-MCNC: 10.1 MG/DL (ref 8.3–10.6)
CHLORIDE BLD-SCNC: 106 MMOL/L (ref 99–110)
CHOLESTEROL, TOTAL: 172 MG/DL (ref 0–199)
CO2: 24 MMOL/L (ref 21–32)
CREAT SERPL-MCNC: 0.9 MG/DL (ref 0.6–1.2)
CREATININE URINE: 71.6 MG/DL (ref 28–259)
GFR AFRICAN AMERICAN: >60
GFR NON-AFRICAN AMERICAN: >60
GLOBULIN: 2.3 G/DL
GLUCOSE BLD-MCNC: 88 MG/DL (ref 70–99)
HDLC SERPL-MCNC: 66 MG/DL (ref 40–60)
LDL CHOLESTEROL CALCULATED: 86 MG/DL
MICROALBUMIN UR-MCNC: <1.2 MG/DL
MICROALBUMIN/CREAT UR-RTO: NORMAL MG/G (ref 0–30)
POTASSIUM SERPL-SCNC: 5 MMOL/L (ref 3.5–5.1)
SODIUM BLD-SCNC: 145 MMOL/L (ref 136–145)
TOTAL PROTEIN: 6.7 G/DL (ref 6.4–8.2)
TRIGL SERPL-MCNC: 102 MG/DL (ref 0–150)
VLDLC SERPL CALC-MCNC: 20 MG/DL

## 2020-01-29 PROCEDURE — 99214 OFFICE O/P EST MOD 30 MIN: CPT | Performed by: FAMILY MEDICINE

## 2020-01-29 PROCEDURE — G8510 SCR DEP NEG, NO PLAN REQD: HCPCS | Performed by: FAMILY MEDICINE

## 2020-01-29 PROCEDURE — 36415 COLL VENOUS BLD VENIPUNCTURE: CPT | Performed by: FAMILY MEDICINE

## 2020-01-29 RX ORDER — ATENOLOL 100 MG/1
100 TABLET ORAL DAILY
Qty: 90 TABLET | Refills: 3 | Status: SHIPPED | OUTPATIENT
Start: 2020-01-29 | End: 2020-07-21 | Stop reason: SDUPTHER

## 2020-01-29 ASSESSMENT — PATIENT HEALTH QUESTIONNAIRE - PHQ9
2. FEELING DOWN, DEPRESSED OR HOPELESS: 0
1. LITTLE INTEREST OR PLEASURE IN DOING THINGS: 0
SUM OF ALL RESPONSES TO PHQ QUESTIONS 1-9: 0
SUM OF ALL RESPONSES TO PHQ QUESTIONS 1-9: 0
SUM OF ALL RESPONSES TO PHQ9 QUESTIONS 1 & 2: 0

## 2020-01-29 ASSESSMENT — ENCOUNTER SYMPTOMS
SHORTNESS OF BREATH: 0
CHEST TIGHTNESS: 0

## 2020-01-29 NOTE — PATIENT INSTRUCTIONS
Learning About Living Jennifer  What is a living will? A living will is a legal form you use to write down the kind of care you want at the end of your life. It is used by the health professionals who will treat you if you aren't able to decide for yourself. If you put your wishes in writing, your loved ones and others will know what kind of care you want. They won't need to guess. This can ease your mind and be helpful to others. A living will is not the same as an estate or property will. An estate will explains what you want to happen with your money and property after you die. Is a living will a legal document? A living will is a legal document. Each state has its own laws about living johnson. If you move to another state, make sure that your living will is legal in the state where you now live. Or you might use a universal form that has been approved by many states. This kind of form can sometimes be completed and stored online. Your electronic copy will then be available wherever you have a connection to the Internet. In most cases, doctors will respect your wishes even if you have a form from a different state. · You don't need an  to complete a living will. But legal advice can be helpful if your state's laws are unclear, your health history is complicated, or your family can't agree on what should be in your living will. · You can change your living will at any time. Some people find that their wishes about end-of-life care change as their health changes. · In addition to making a living will, think about completing a medical power of  form. This form lets you name the person you want to make end-of-life treatment decisions for you (your \"health care agent\") if you're not able to. Many hospitals and nursing homes will give you the forms you need to complete a living will and a medical power of .   · Your living will is used only if you can't make or communicate decisions for yourself anymore. If you become able to make decisions again, you can accept or refuse any treatment, no matter what you wrote in your living will. · Your state may offer an online registry. This is a place where you can store your living will online so the doctors and nurses who need to treat you can find it right away. What should you think about when creating a living will? Talk about your end-of-life wishes with your family members and your doctor. Let them know what you want. That way the people making decisions for you won't be surprised by your choices. Think about these questions as you make your living will:  · Do you know enough about life support methods that might be used? If not, talk to your doctor so you know what might be done if you can't breathe on your own, your heart stops, or you're unable to swallow. · What things would you still want to be able to do after you receive life-support methods? Would you want to be able to walk? To speak? To eat on your own? To live without the help of machines? · If you have a choice, where do you want to be cared for? In your home? At a hospital or nursing home? · Do you want certain Mu-ism practices performed if you become very ill? · If you have a choice at the end of your life, where would you prefer to die? At home? In a hospital or nursing home? Somewhere else? · Would you prefer to be buried or cremated? · Do you want your organs to be donated after you die? What should you do with your living will? · Make sure that your family members and your health care agent have copies of your living will. · Give your doctor a copy of your living will to keep in your medical record. If you have more than one doctor, make sure that each one has a copy. · You may want to put a copy of your living will where it can be easily found. Where can you learn more? Go to [email or web address] and sign in to your [employer] account.  Enter Z102 in your use of this information. The diagnoses and medications listed in this after visit summary may not be accurate at the time of check out. Please check MY CHART in 28-48 hours for possible corrections. Late cancellation policy: So that we can better accommodate people who are sick, please give our office 24 hour notice for an appointment cancellation. Thank you. Missed appointments: Your care is very important to us. It is important that you keep your scheduled appointments. Multiple missed appointments may lead to a dismissal from the office. Later arrival policy: If you are more than 10 minutes late for your appointment, you will be asked to reschedule. Please allow 5-7 business days for paperwork to be processed. It is important that you check your MY Chart messages, as they include appointment reminders, test results, and other important information. If you have forgotten your password, please call [phone].

## 2020-01-29 NOTE — PROGRESS NOTES
Patient ID: Iveth Strauss 1950    . Chief Complaint   Patient presents with    Hypertension    Hyperlipidemia         Hypertension   This is a chronic problem. The current episode started more than 1 year ago. The problem is unchanged. The problem is controlled. Associated symptoms include chest pain (but not bad enough to take NTG). Pertinent negatives include no palpitations or shortness of breath. Risk factors for coronary artery disease include post-menopausal state and obesity. Past treatments include angiotensin blockers. There are no compliance problems. Hyperlipidemia   This is a chronic problem. The current episode started more than 1 year ago. The problem is controlled. Associated symptoms include chest pain (but not bad enough to take NTG). Pertinent negatives include no shortness of breath. Current antihyperlipidemic treatment includes statins. Risk factors for coronary artery disease include post-menopausal, hypertension and obesity. Coronary Artery Disease   Presents for follow-up visit. Symptoms include chest pain (but not bad enough to take NTG). Pertinent negatives include no chest tightness, leg swelling, palpitations or shortness of breath. [facility] cardiologist regularly. Has never had to use her nitroglycerin) Risk factors include hyperlipidemia. Review of Systems   Constitutional: Negative for activity change. Respiratory: Negative for chest tightness and shortness of breath. Cardiovascular: Positive for chest pain (but not bad enough to take NTG). Negative for palpitations and leg swelling. Patient Active Problem List   Diagnosis    Essential hypertension    Hyperlipidemia    History of colon polyps    Obesity (BMI 30.0-34. 9)    Osteopenia of multiple sites    Coronary artery disease involving native coronary artery of native heart without angina pectoris    Hepatic steatosis       Past Surgical History:   Procedure Laterality Date    CARDIOVASCULAR STRESS TEST  2009    treadmill, Dr. Sandeep Bell      right     SECTION      [facility]OSCOPY  [facility]    COLONOSCOPY  2015    diverticulosis, internal hemorroids. repeat 5 years. Cristhian Robles    ROTATOR CUFF REPAIR Right 2016    TUBAL LIGATION         Family History   Problem Relation Age of Onset    Stroke Father     Coronary Art Dis Father     Heart Attack Brother 72       Current Outpatient Medications on File Prior to Visit   Medication Sig Dispense Refill    triamcinolone (KENALOG) 0.1 % cream       Calcium Carb-Cholecalciferol (CALTRATE 600+D) 600-800 MG-UNIT TABS 1 tab twice per day 60 tablet 11    nitroGLYCERIN (NITROSTAT) 0.3 MG SL tablet Place 1 tablet under the tongue every 5 minutes as needed for Chest pain 25 tablet 0    clobetasol (OLUX) 0.05 % foam       ketoconazole (NIZORAL) 2 % shampoo       losartan (COZAAR) 50 MG tablet Take 1 tablet by mouth daily      aspirin 81 MG tablet Take 81 mg by mouth daily.  Multiple Vitamin (MULTIVITAMIN PO) Take  by mouth daily.  OTEZLA 30 MG TABS       atorvastatin (LIPITOR) 10 MG tablet Take 1 tablet by mouth daily 90 tablet 3     Current Facility-Administered Medications on File Prior to Visit   Medication Dose Route Frequency Provider Last Rate Last Dose    promethazine (PHENERGAN) injection 25 mg  25 mg Intramuscular Q6H PRN Stephani Andersen MD   25 mg at 14 1156                   Objective:         Physical Exam  Vitals signs and nursing note reviewed. Constitutional:       General: She is not in acute distress. Appearance: She is well-developed. HENT:      Head: Normocephalic and atraumatic. Neck:      Musculoskeletal: Neck supple. Cardiovascular:      Rate and Rhythm: Normal rate and regular rhythm. Heart sounds: Normal heart sounds, S1 normal and S2 normal.   Pulmonary:      Effort: Pulmonary effort is normal. No respiratory distress. Breath sounds: Normal breath sounds.  Globulin 02/07/2019 2.6     TSH 02/07/2019 5.89*    Color, UA 02/07/2019 YELLOW     Clarity, UA 02/07/2019 Clear     Glucose, Ur 02/07/2019 Negative     Bilirubin Urine 02/07/2019 Negative     Ketones, Urine 02/07/2019 Negative     Specific Gravity, UA 02/07/2019 1.012     Blood, Urine 02/07/2019 Negative     pH, UA 02/07/2019 6.0     Protein, UA 02/07/2019 Negative     Urobilinogen, Urine 02/07/2019 0.2     Nitrite, Urine 02/07/2019 Negative     Leukocyte Esterase, Urine 02/07/2019 Negative     Microscopic Examination 02/07/2019 Not Indicated     Urine Type 02/07/2019 Clean catch     T4 Free 02/07/2019 1.0            Assessment:       Diagnosis Orders   1. Essential hypertension  Lipid Panel    Comprehensive Metabolic Panel    Microalbumin / Creatinine Urine Ratio    atenolol (TENORMIN) 100 MG tablet   2. Encounter for screening mammogram for malignant neoplasm of breast  MARYANN Screening Bilateral   3. History of colon polyps     4. Coronary artery disease involving native coronary artery of native heart without angina pectoris             Plan:      Reminded her to get mammogram and colonoscopy    Hypertension stable continue current medication    Urged her to talk to her cardiologist for her chest pain.   She has access to NTG and sees cardiologist soon      Return in about 25 weeks (around 7/22/2020) for HTN, Medicare physical.

## 2020-02-13 ENCOUNTER — TELEPHONE (OUTPATIENT)
Dept: FAMILY MEDICINE CLINIC | Age: 70
End: 2020-02-13

## 2020-02-13 ENCOUNTER — OFFICE VISIT (OUTPATIENT)
Dept: FAMILY MEDICINE CLINIC | Age: 70
End: 2020-02-13
Payer: MEDICARE

## 2020-02-13 VITALS
SYSTOLIC BLOOD PRESSURE: 122 MMHG | HEART RATE: 62 BPM | HEIGHT: 64 IN | DIASTOLIC BLOOD PRESSURE: 80 MMHG | BODY MASS INDEX: 30.42 KG/M2 | WEIGHT: 178.2 LBS

## 2020-02-13 PROCEDURE — 99212 OFFICE O/P EST SF 10 MIN: CPT | Performed by: FAMILY MEDICINE

## 2020-02-13 PROCEDURE — 3288F FALL RISK ASSESSMENT DOCD: CPT | Performed by: FAMILY MEDICINE

## 2020-02-19 ENCOUNTER — HOSPITAL ENCOUNTER (OUTPATIENT)
Dept: ULTRASOUND IMAGING | Age: 70
Discharge: HOME | End: 2020-02-19
Payer: MEDICARE

## 2020-02-19 ENCOUNTER — HOSPITAL ENCOUNTER (OUTPATIENT)
Dept: WOMENS IMAGING | Age: 70
Discharge: HOME | End: 2020-02-19
Payer: MEDICARE

## 2020-02-19 PROCEDURE — G0279 TOMOSYNTHESIS, MAMMO: HCPCS

## 2020-02-19 PROCEDURE — 76642 ULTRASOUND BREAST LIMITED: CPT

## 2020-02-25 ENCOUNTER — HOSPITAL ENCOUNTER (OUTPATIENT)
Dept: ULTRASOUND IMAGING | Age: 70
Discharge: HOME | End: 2020-02-25
Payer: MEDICARE

## 2020-02-25 ENCOUNTER — HOSPITAL ENCOUNTER (OUTPATIENT)
Dept: WOMENS IMAGING | Age: 70
Discharge: HOME | End: 2020-02-25
Payer: MEDICARE

## 2020-02-25 PROCEDURE — 88360 TUMOR IMMUNOHISTOCHEM/MANUAL: CPT

## 2020-02-25 PROCEDURE — 2720000010 US BREAST BIOPSY W LOC DEVICE 1ST LESION LEFT

## 2020-02-25 PROCEDURE — 88342 IMHCHEM/IMCYTCHM 1ST ANTB: CPT

## 2020-02-25 PROCEDURE — 77065 DX MAMMO INCL CAD UNI: CPT

## 2020-02-25 PROCEDURE — 88374 M/PHMTRC ALYS ISHQUANT/SEMIQ: CPT

## 2020-02-25 PROCEDURE — 88305 TISSUE EXAM BY PATHOLOGIST: CPT

## 2020-02-28 ENCOUNTER — OFFICE VISIT (OUTPATIENT)
Dept: FAMILY MEDICINE CLINIC | Age: 70
End: 2020-02-28
Payer: MEDICARE

## 2020-02-28 VITALS
WEIGHT: 177.2 LBS | HEART RATE: 56 BPM | BODY MASS INDEX: 30.42 KG/M2 | DIASTOLIC BLOOD PRESSURE: 80 MMHG | SYSTOLIC BLOOD PRESSURE: 120 MMHG

## 2020-02-28 PROCEDURE — 99213 OFFICE O/P EST LOW 20 MIN: CPT | Performed by: FAMILY MEDICINE

## 2020-02-28 NOTE — PROGRESS NOTES
(MULTIVITAMIN PO) Take  by mouth daily.  Calcium Carb-Cholecalciferol (CALTRATE 600+D) 600-800 MG-UNIT TABS 1 tab twice per day 60 tablet 11     Current Facility-Administered Medications on File Prior to Visit   Medication Dose Route Frequency Provider Last Rate Last Dose    promethazine (PHENERGAN) injection 25 mg  25 mg Intramuscular Q6H PRN Ricky Obando MD   25 mg at 12/17/14 1156                   Objective:           Physical Exam  Vitals signs and nursing note reviewed. Constitutional:       General: She is not in acute distress. Appearance: She is well-developed. Chest:       Neurological:      Comments: No facial droop. Moving all extremities   Psychiatric:         Attention and Perception: She is attentive. Mood and Affect: Mood is anxious. Speech: Speech normal.         Behavior: Behavior normal.         Thought Content: Thought content normal.       Vitals:    02/28/20 1045   BP: 120/80   Site: Left Upper Arm   Position: Sitting   Cuff Size: Medium Adult   Pulse: 56   Weight: 177 lb 3.2 oz (80.4 kg)     Body mass index is 30.42 kg/m². Wt Readings from Last 3 Encounters:   02/28/20 177 lb 3.2 oz (80.4 kg)   02/13/20 178 lb 3.2 oz (80.8 kg)   01/29/20 178 lb (80.7 kg)     BP Readings from Last 3 Encounters:   02/28/20 120/80   02/13/20 122/80   01/29/20 122/76          No results found for this visit on 02/28/20.    [id number] Age/Sex:  1950 (Age:  78), F   Collected:  2/25/2020 10:30 Location:  [facility]    Received:  2/25/2020 12:36 Physician: Ricky Obando MD    Reported:   Copies To: Naima Chi MD  [address] [place] [address]        Specimen(s) Received:  Left breast mass @ 2:00=3.3cm      Preliminary Report  Date Ordered: 2/26/2020  Status: Signed Out    Preliminary Diagnosis    Needle biopsy, left breast mass at 2 o'clock:       INVASIVE DUCTAL CARCINOMA. Assessment:       Diagnosis Orders   1.  Infiltrating ductal carcinoma of left female breast ([facility] [address]

## 2020-02-28 NOTE — PATIENT INSTRUCTIONS
VOTE ON MARCH SEVENTEENTH    PLEASE BRING YOUR MEDICATIONS TO ALL APPOINTMENTS    The diagnoses and medications listed in this after visit summary may not be accurate at the time of check out. Please check MY CHART in 28-48 hours for possible corrections. Late cancellation policy: So that we can better accommodate people who are sick, please give our office 24 hour notice for an appointment cancellation. Thank you. Missed appointments: Your care is very important to us. It is important that you keep your scheduled appointments. Multiple missed appointments may lead to a dismissal from the office. Later arrival policy: If you are more than 10 minutes late for your appointment, you will be asked to reschedule. Please allow 5-7 business days for paperwork to be processed. It is important that you check your MY Chart messages, as they include appointment reminders, test results, and other important information. If you have forgotten your password, please call [phone].

## 2020-03-02 ENCOUNTER — CLINICAL DOCUMENTATION (OUTPATIENT)
Dept: CASE MANAGEMENT | Age: 70
End: 2020-03-02

## 2020-03-02 NOTE — PROGRESS NOTES
Called and spoke with patient concerning appointment with Dr. Roxy Boeck on Monday, 3/9/20 at 1430. Patient aware of her breast cancer diagnosis. Discussed her left breast biopsy pathology results and explained that we are still waiting on the final HER-2 result to come back - voiced understanding. Will discuss patient with Dr. Domonique Souza tomorrow to see if he would like her to have a surgical consultation at this point as well.

## 2020-03-09 ENCOUNTER — HOSPITAL ENCOUNTER (OUTPATIENT)
Dept: INFUSION THERAPY | Age: 70
Discharge: HOME | End: 2020-03-09
Payer: MEDICARE

## 2020-03-09 LAB
ALBUMIN SERPL-MCNC: 4.8 GM/DL (ref 3.4–5)
ALP BLD-CCNC: 61 IU/L (ref 40–128)
ALT SERPL-CCNC: 24 U/L (ref 10–40)
ANION GAP SERPL CALCULATED.3IONS-SCNC: 13 MMOL/L (ref 4–16)
AST SERPL-CCNC: 27 IU/L (ref 15–37)
BASOPHILS ABSOLUTE: 0.1 K/CU MM
BASOPHILS RELATIVE PERCENT: 1.1 % (ref 0–1)
BILIRUB SERPL-MCNC: 1 MG/DL (ref 0–1)
BUN BLDV-MCNC: 15 MG/DL (ref 6–23)
CALCIUM SERPL-MCNC: 10.2 MG/DL (ref 8.3–10.6)
CHLORIDE BLD-SCNC: 99 MMOL/L (ref 99–110)
CO2: 28 MMOL/L (ref 21–32)
CREAT SERPL-MCNC: 1 MG/DL (ref 0.6–1.1)
DIFFERENTIAL TYPE: ABNORMAL
EOSINOPHILS ABSOLUTE: 0.4 K/CU MM
EOSINOPHILS RELATIVE PERCENT: 4.2 % (ref 0–3)
GFR AFRICAN AMERICAN: >60 ML/MIN/1.73M2
GFR NON-AFRICAN AMERICAN: 55 ML/MIN/1.73M2
GLUCOSE BLD-MCNC: 79 MG/DL (ref 70–99)
HCT VFR BLD CALC: 42.3 % (ref 37–47)
HEMOGLOBIN: 14.4 GM/DL (ref 12.5–16)
LYMPHOCYTES ABSOLUTE: 2.1 K/CU MM
LYMPHOCYTES RELATIVE PERCENT: 21.1 % (ref 24–44)
MCH RBC QN AUTO: 31.1 PG (ref 27–31)
MCHC RBC AUTO-ENTMCNC: 34 % (ref 32–36)
MCV RBC AUTO: 91.4 FL (ref 78–100)
MONOCYTES ABSOLUTE: 1 K/CU MM
MONOCYTES RELATIVE PERCENT: 10.1 % (ref 0–4)
PDW BLD-RTO: 14.6 % (ref 11.7–14.9)
PLATELET # BLD: 321 K/CU MM (ref 140–440)
PMV BLD AUTO: 9.5 FL (ref 7.5–11.1)
POTASSIUM SERPL-SCNC: 4.8 MMOL/L (ref 3.5–5.1)
RBC # BLD: 4.63 M/CU MM (ref 4.2–5.4)
SEGMENTED NEUTROPHILS ABSOLUTE COUNT: 6.4 K/CU MM
SEGMENTED NEUTROPHILS RELATIVE PERCENT: 63.5 % (ref 36–66)
SODIUM BLD-SCNC: 140 MMOL/L (ref 135–145)
TOTAL PROTEIN: 7.4 GM/DL (ref 6.4–8.2)
WBC # BLD: 10.1 K/CU MM (ref 4–10.5)

## 2020-03-09 PROCEDURE — 85025 COMPLETE CBC W/AUTO DIFF WBC: CPT

## 2020-03-09 PROCEDURE — 80053 COMPREHEN METABOLIC PANEL: CPT

## 2020-03-09 PROCEDURE — 36415 COLL VENOUS BLD VENIPUNCTURE: CPT

## 2020-03-10 ENCOUNTER — HOSPITAL ENCOUNTER (OUTPATIENT)
Age: 70
Discharge: HOME | End: 2020-03-10
Payer: MEDICARE

## 2020-03-10 ENCOUNTER — OFFICE VISIT (OUTPATIENT)
Dept: SURGERY | Age: 70
End: 2020-03-10
Payer: MEDICARE

## 2020-03-10 ENCOUNTER — HOSPITAL ENCOUNTER (OUTPATIENT)
Dept: GENERAL RADIOLOGY | Age: 70
Discharge: HOME | End: 2020-03-10
Payer: MEDICARE

## 2020-03-10 VITALS
SYSTOLIC BLOOD PRESSURE: 176 MMHG | BODY MASS INDEX: 30.01 KG/M2 | HEIGHT: 64 IN | DIASTOLIC BLOOD PRESSURE: 66 MMHG | RESPIRATION RATE: 14 BRPM | HEART RATE: 55 BPM | WEIGHT: 175.8 LBS

## 2020-03-10 PROCEDURE — 71046 X-RAY EXAM CHEST 2 VIEWS: CPT

## 2020-03-10 PROCEDURE — 99203 OFFICE O/P NEW LOW 30 MIN: CPT | Performed by: NURSE PRACTITIONER

## 2020-03-18 ENCOUNTER — ANESTHESIA EVENT (OUTPATIENT)
Dept: OPERATING ROOM | Age: 70
End: 2020-03-18
Payer: MEDICARE

## 2020-03-20 ENCOUNTER — HOSPITAL ENCOUNTER (OUTPATIENT)
Dept: NUCLEAR MEDICINE | Age: 70
Discharge: HOME | End: 2020-03-20
Payer: MEDICARE

## 2020-03-20 ENCOUNTER — HOSPITAL ENCOUNTER (OUTPATIENT)
Dept: MAMMOGRAPHY | Age: 70
Discharge: HOME | End: 2020-03-20
Attending: SURGERY
Payer: MEDICARE

## 2020-03-20 ENCOUNTER — APPOINTMENT (OUTPATIENT)
Dept: GENERAL RADIOLOGY | Age: 70
End: 2020-03-20
Attending: SURGERY
Payer: MEDICARE

## 2020-03-20 ENCOUNTER — HOSPITAL ENCOUNTER (OUTPATIENT)
Age: 70
Setting detail: OBSERVATION
Discharge: HOME | End: 2020-03-21
Attending: SURGERY | Admitting: SURGERY
Payer: MEDICARE

## 2020-03-20 ENCOUNTER — ANESTHESIA (OUTPATIENT)
Dept: OPERATING ROOM | Age: 70
End: 2020-03-20
Payer: MEDICARE

## 2020-03-20 ENCOUNTER — HOSPITAL ENCOUNTER (OUTPATIENT)
Dept: ULTRASOUND IMAGING | Age: 70
Discharge: HOME | End: 2020-03-20
Payer: MEDICARE

## 2020-03-20 VITALS
RESPIRATION RATE: 5 BRPM | DIASTOLIC BLOOD PRESSURE: 82 MMHG | TEMPERATURE: 98.6 F | SYSTOLIC BLOOD PRESSURE: 163 MMHG | OXYGEN SATURATION: 86 %

## 2020-03-20 PROBLEM — R11.0 POSTOPERATIVE NAUSEA: Status: ACTIVE | Noted: 2020-03-20

## 2020-03-20 PROBLEM — Z98.890 POSTOPERATIVE NAUSEA: Status: ACTIVE | Noted: 2020-03-20

## 2020-03-20 PROCEDURE — 77065 DX MAMMO INCL CAD UNI: CPT

## 2020-03-20 PROCEDURE — 6360000002 HC RX W HCPCS: Performed by: ANESTHESIOLOGY

## 2020-03-20 PROCEDURE — 7100000011 HC PHASE II RECOVERY - ADDTL 15 MIN: Performed by: SURGERY

## 2020-03-20 PROCEDURE — 7100000000 HC PACU RECOVERY - FIRST 15 MIN: Performed by: SURGERY

## 2020-03-20 PROCEDURE — 88341 IMHCHEM/IMCYTCHM EA ADD ANTB: CPT

## 2020-03-20 PROCEDURE — 19301 PARTIAL MASTECTOMY: CPT | Performed by: NURSE PRACTITIONER

## 2020-03-20 PROCEDURE — A950 HC RX DIAGNOSTIC RADIOPHARMACEUTICAL

## 2020-03-20 PROCEDURE — G0378 HOSPITAL OBSERVATION PER HR: HCPCS

## 2020-03-20 PROCEDURE — 2720000010 HC SURG SUPPLY STERILE: Performed by: SURGERY

## 2020-03-20 PROCEDURE — 2580000003 HC RX 258: Performed by: NURSE ANESTHETIST, CERTIFIED REGISTERED

## 2020-03-20 PROCEDURE — 76942 ECHO GUIDE FOR BIOPSY: CPT | Performed by: NURSE ANESTHETIST, CERTIFIED REGISTERED

## 2020-03-20 PROCEDURE — 7100000010 HC PHASE II RECOVERY - FIRST 15 MIN: Performed by: SURGERY

## 2020-03-20 PROCEDURE — 88331 PATH CONSLTJ SURG 1 BLK 1SPC: CPT

## 2020-03-20 PROCEDURE — 6370000000 HC RX 637 (ALT 250 FOR IP): Performed by: NURSE ANESTHETIST, CERTIFIED REGISTERED

## 2020-03-20 PROCEDURE — 19285 PERQ DEV BREAST 1ST US IMAG: CPT

## 2020-03-20 PROCEDURE — 3600000013 HC SURGERY LEVEL 3 ADDTL 15MIN: Performed by: SURGERY

## 2020-03-20 PROCEDURE — 38900 IO MAP OF SENT LYMPH NODE: CPT | Performed by: SURGERY

## 2020-03-20 PROCEDURE — 7100000001 HC PACU RECOVERY - ADDTL 15 MIN: Performed by: SURGERY

## 2020-03-20 PROCEDURE — 2500000003 HC RX 250 WO HCPCS: Performed by: NURSE ANESTHETIST, CERTIFIED REGISTERED

## 2020-03-20 PROCEDURE — 71045 X-RAY EXAM CHEST 1 VIEW: CPT

## 2020-03-20 PROCEDURE — 3600000003 HC SURGERY LEVEL 3 BASE: Performed by: SURGERY

## 2020-03-20 PROCEDURE — 19301 PARTIAL MASTECTOMY: CPT | Performed by: SURGERY

## 2020-03-20 PROCEDURE — 6360000002 HC RX W HCPCS: Performed by: SURGERY

## 2020-03-20 PROCEDURE — 2709999900 HC NON-CHARGEABLE SUPPLY: Performed by: SURGERY

## 2020-03-20 PROCEDURE — 38525 BIOPSY/REMOVAL LYMPH NODES: CPT | Performed by: SURGERY

## 2020-03-20 PROCEDURE — 6360000002 HC RX W HCPCS: Performed by: NURSE ANESTHETIST, CERTIFIED REGISTERED

## 2020-03-20 PROCEDURE — 3430000000 HC RX DIAGNOSTIC RADIOPHARMACEUTICAL: Performed by: SURGERY

## 2020-03-20 PROCEDURE — 3430000000 HC RX DIAGNOSTIC RADIOPHARMACEUTICAL

## 2020-03-20 PROCEDURE — 94761 N-INVAS EAR/PLS OXIMETRY MLT: CPT

## 2020-03-20 PROCEDURE — 3700000000 HC ANESTHESIA ATTENDED CARE: Performed by: SURGERY

## 2020-03-20 PROCEDURE — 2580000003 HC RX 258: Performed by: SURGERY

## 2020-03-20 PROCEDURE — 88342 IMHCHEM/IMCYTCHM 1ST ANTB: CPT

## 2020-03-20 PROCEDURE — 88307 TISSUE EXAM BY PATHOLOGIST: CPT

## 2020-03-20 PROCEDURE — A9520 TC99 TILMANOCEPT DIAG 0.5MCI: HCPCS | Performed by: SURGERY

## 2020-03-20 PROCEDURE — C1788 PORT, INDWELLING, IMP: HCPCS | Performed by: SURGERY

## 2020-03-20 PROCEDURE — 3700000001 HC ADD 15 MINUTES (ANESTHESIA): Performed by: SURGERY

## 2020-03-20 PROCEDURE — 2580000003 HC RX 258: Performed by: ANESTHESIOLOGY

## 2020-03-20 PROCEDURE — 38792 RA TRACER ID OF SENTINL NODE: CPT

## 2020-03-20 DEVICE — PORT INFUS OD2.7MM ID1.5MM INTRO 8FR TI POLYUR CATH DETACH CT80STPD] ANGIODYNAMICS INC]: Type: IMPLANTABLE DEVICE | Site: CHEST | Status: FUNCTIONAL

## 2020-03-20 RX ORDER — LABETALOL 20 MG/4 ML (5 MG/ML) INTRAVENOUS SYRINGE
5 EVERY 10 MIN PRN
Status: DISCONTINUED | OUTPATIENT
Start: 2020-03-20 | End: 2020-03-20 | Stop reason: HOSPADM

## 2020-03-20 RX ORDER — KETOROLAC TROMETHAMINE 30 MG/ML
INJECTION, SOLUTION INTRAMUSCULAR; INTRAVENOUS PRN
Status: DISCONTINUED | OUTPATIENT
Start: 2020-03-20 | End: 2020-03-20 | Stop reason: SDUPTHER

## 2020-03-20 RX ORDER — HYDROMORPHONE HCL 110MG/55ML
0.25 PATIENT CONTROLLED ANALGESIA SYRINGE INTRAVENOUS EVERY 5 MIN PRN
Status: DISCONTINUED | OUTPATIENT
Start: 2020-03-20 | End: 2020-03-20 | Stop reason: HOSPADM

## 2020-03-20 RX ORDER — DIPHENHYDRAMINE HYDROCHLORIDE 50 MG/ML
INJECTION INTRAMUSCULAR; INTRAVENOUS PRN
Status: DISCONTINUED | OUTPATIENT
Start: 2020-03-20 | End: 2020-03-20 | Stop reason: SDUPTHER

## 2020-03-20 RX ORDER — ONDANSETRON 2 MG/ML
INJECTION INTRAMUSCULAR; INTRAVENOUS PRN
Status: DISCONTINUED | OUTPATIENT
Start: 2020-03-20 | End: 2020-03-20 | Stop reason: SDUPTHER

## 2020-03-20 RX ORDER — LIDOCAINE HYDROCHLORIDE 20 MG/ML
INJECTION, SOLUTION INTRAVENOUS PRN
Status: DISCONTINUED | OUTPATIENT
Start: 2020-03-20 | End: 2020-03-20 | Stop reason: SDUPTHER

## 2020-03-20 RX ORDER — SCOLOPAMINE TRANSDERMAL SYSTEM 1 MG/1
PATCH, EXTENDED RELEASE TRANSDERMAL PRN
Status: DISCONTINUED | OUTPATIENT
Start: 2020-03-20 | End: 2020-03-20 | Stop reason: SDUPTHER

## 2020-03-20 RX ORDER — KETAMINE HYDROCHLORIDE 10 MG/ML
INJECTION, SOLUTION INTRAMUSCULAR; INTRAVENOUS PRN
Status: DISCONTINUED | OUTPATIENT
Start: 2020-03-20 | End: 2020-03-20 | Stop reason: SDUPTHER

## 2020-03-20 RX ORDER — FENTANYL CITRATE 50 UG/ML
50 INJECTION, SOLUTION INTRAMUSCULAR; INTRAVENOUS EVERY 5 MIN PRN
Status: DISCONTINUED | OUTPATIENT
Start: 2020-03-20 | End: 2020-03-20 | Stop reason: HOSPADM

## 2020-03-20 RX ORDER — SODIUM CHLORIDE, SODIUM LACTATE, POTASSIUM CHLORIDE, CALCIUM CHLORIDE 600; 310; 30; 20 MG/100ML; MG/100ML; MG/100ML; MG/100ML
INJECTION, SOLUTION INTRAVENOUS CONTINUOUS PRN
Status: DISCONTINUED | OUTPATIENT
Start: 2020-03-20 | End: 2020-03-20 | Stop reason: SDUPTHER

## 2020-03-20 RX ORDER — PROMETHAZINE HYDROCHLORIDE 25 MG/ML
6.25 INJECTION, SOLUTION INTRAMUSCULAR; INTRAVENOUS
Status: DISCONTINUED | OUTPATIENT
Start: 2020-03-20 | End: 2020-03-20 | Stop reason: HOSPADM

## 2020-03-20 RX ORDER — SODIUM CHLORIDE, SODIUM LACTATE, POTASSIUM CHLORIDE, CALCIUM CHLORIDE 600; 310; 30; 20 MG/100ML; MG/100ML; MG/100ML; MG/100ML
INJECTION, SOLUTION INTRAVENOUS
Status: COMPLETED
Start: 2020-03-20 | End: 2020-03-20

## 2020-03-20 RX ORDER — HYDROMORPHONE HCL 110MG/55ML
0.5 PATIENT CONTROLLED ANALGESIA SYRINGE INTRAVENOUS EVERY 5 MIN PRN
Status: DISCONTINUED | OUTPATIENT
Start: 2020-03-20 | End: 2020-03-20 | Stop reason: HOSPADM

## 2020-03-20 RX ORDER — 0.9 % SODIUM CHLORIDE 0.9 %
500 INTRAVENOUS SOLUTION INTRAVENOUS ONCE
Status: COMPLETED | OUTPATIENT
Start: 2020-03-20 | End: 2020-03-20

## 2020-03-20 RX ORDER — HYDRALAZINE HYDROCHLORIDE 20 MG/ML
5 INJECTION INTRAMUSCULAR; INTRAVENOUS EVERY 10 MIN PRN
Status: DISCONTINUED | OUTPATIENT
Start: 2020-03-20 | End: 2020-03-20 | Stop reason: HOSPADM

## 2020-03-20 RX ORDER — DEXAMETHASONE SODIUM PHOSPHATE 4 MG/ML
INJECTION, SOLUTION INTRA-ARTICULAR; INTRALESIONAL; INTRAMUSCULAR; INTRAVENOUS; SOFT TISSUE PRN
Status: DISCONTINUED | OUTPATIENT
Start: 2020-03-20 | End: 2020-03-20 | Stop reason: SDUPTHER

## 2020-03-20 RX ORDER — FENTANYL CITRATE 50 UG/ML
25 INJECTION, SOLUTION INTRAMUSCULAR; INTRAVENOUS EVERY 5 MIN PRN
Status: DISCONTINUED | OUTPATIENT
Start: 2020-03-20 | End: 2020-03-20 | Stop reason: HOSPADM

## 2020-03-20 RX ORDER — KETOROLAC TROMETHAMINE 10 MG/1
10 TABLET, FILM COATED ORAL EVERY 6 HOURS PRN
Qty: 20 TABLET | Refills: 0 | Status: ON HOLD
Start: 2020-03-20 | End: 2020-03-30 | Stop reason: HOSPADM

## 2020-03-20 RX ORDER — ROCURONIUM BROMIDE 10 MG/ML
INJECTION, SOLUTION INTRAVENOUS PRN
Status: DISCONTINUED | OUTPATIENT
Start: 2020-03-20 | End: 2020-03-20 | Stop reason: SDUPTHER

## 2020-03-20 RX ORDER — SODIUM CHLORIDE 9 MG/ML
INJECTION, SOLUTION INTRAVENOUS CONTINUOUS
Status: DISCONTINUED | OUTPATIENT
Start: 2020-03-20 | End: 2020-03-21 | Stop reason: HOSPADM

## 2020-03-20 RX ORDER — FENTANYL CITRATE 50 UG/ML
INJECTION, SOLUTION INTRAMUSCULAR; INTRAVENOUS PRN
Status: DISCONTINUED | OUTPATIENT
Start: 2020-03-20 | End: 2020-03-20 | Stop reason: SDUPTHER

## 2020-03-20 RX ORDER — SCOLOPAMINE TRANSDERMAL SYSTEM 1 MG/1
1 PATCH, EXTENDED RELEASE TRANSDERMAL
Status: DISCONTINUED | OUTPATIENT
Start: 2020-03-20 | End: 2020-03-21 | Stop reason: HOSPADM

## 2020-03-20 RX ORDER — PROMETHAZINE HYDROCHLORIDE 25 MG/ML
12.5 INJECTION, SOLUTION INTRAMUSCULAR; INTRAVENOUS ONCE
Status: COMPLETED | OUTPATIENT
Start: 2020-03-20 | End: 2020-03-20

## 2020-03-20 RX ORDER — PROMETHAZINE HYDROCHLORIDE 12.5 MG/1
12.5 TABLET ORAL 3 TIMES DAILY PRN
Qty: 12 TABLET | Refills: 0 | Status: SHIPPED | OUTPATIENT
Start: 2020-03-20 | End: 2020-03-27

## 2020-03-20 RX ORDER — SODIUM CHLORIDE, SODIUM LACTATE, POTASSIUM CHLORIDE, CALCIUM CHLORIDE 600; 310; 30; 20 MG/100ML; MG/100ML; MG/100ML; MG/100ML
INJECTION, SOLUTION INTRAVENOUS CONTINUOUS
Status: DISCONTINUED | OUTPATIENT
Start: 2020-03-20 | End: 2020-03-20

## 2020-03-20 RX ORDER — PROPOFOL 10 MG/ML
INJECTION, EMULSION INTRAVENOUS PRN
Status: DISCONTINUED | OUTPATIENT
Start: 2020-03-20 | End: 2020-03-20 | Stop reason: SDUPTHER

## 2020-03-20 RX ORDER — PROMETHAZINE HYDROCHLORIDE 25 MG/ML
6.25 INJECTION, SOLUTION INTRAMUSCULAR; INTRAVENOUS ONCE
Status: COMPLETED | OUTPATIENT
Start: 2020-03-20 | End: 2020-03-20

## 2020-03-20 RX ORDER — ONDANSETRON 2 MG/ML
4 INJECTION INTRAMUSCULAR; INTRAVENOUS
Status: DISCONTINUED | OUTPATIENT
Start: 2020-03-20 | End: 2020-03-20 | Stop reason: HOSPADM

## 2020-03-20 RX ORDER — KETOROLAC TROMETHAMINE 30 MG/ML
15 INJECTION, SOLUTION INTRAMUSCULAR; INTRAVENOUS EVERY 6 HOURS PRN
Status: DISCONTINUED | OUTPATIENT
Start: 2020-03-20 | End: 2020-03-21 | Stop reason: HOSPADM

## 2020-03-20 RX ORDER — HEPARIN SODIUM (PORCINE) LOCK FLUSH IV SOLN 100 UNIT/ML 100 UNIT/ML
SOLUTION INTRAVENOUS
Status: COMPLETED | OUTPATIENT
Start: 2020-03-20 | End: 2020-03-20

## 2020-03-20 RX ORDER — ONDANSETRON 2 MG/ML
4 INJECTION INTRAMUSCULAR; INTRAVENOUS ONCE
Status: COMPLETED | OUTPATIENT
Start: 2020-03-20 | End: 2020-03-20

## 2020-03-20 RX ORDER — PROMETHAZINE HYDROCHLORIDE 25 MG/ML
12.5 INJECTION, SOLUTION INTRAMUSCULAR; INTRAVENOUS EVERY 4 HOURS PRN
Status: DISCONTINUED | OUTPATIENT
Start: 2020-03-20 | End: 2020-03-21 | Stop reason: HOSPADM

## 2020-03-20 RX ORDER — CEFAZOLIN SODIUM 2 G/100ML
INJECTION, SOLUTION INTRAVENOUS PRN
Status: DISCONTINUED | OUTPATIENT
Start: 2020-03-20 | End: 2020-03-20 | Stop reason: SDUPTHER

## 2020-03-20 RX ADMIN — ONDANSETRON 4 MG: 2 INJECTION INTRAMUSCULAR; INTRAVENOUS at 13:24

## 2020-03-20 RX ADMIN — ONDANSETRON 4 MG: 2 INJECTION INTRAMUSCULAR; INTRAVENOUS at 10:40

## 2020-03-20 RX ADMIN — KETOROLAC TROMETHAMINE 15 MG: 30 INJECTION, SOLUTION INTRAMUSCULAR; INTRAVENOUS at 11:49

## 2020-03-20 RX ADMIN — FENTANYL CITRATE 25 MCG: 50 INJECTION INTRAMUSCULAR; INTRAVENOUS at 11:40

## 2020-03-20 RX ADMIN — KETAMINE HYDROCHLORIDE 40 MG: 10 INJECTION INTRAMUSCULAR; INTRAVENOUS at 10:40

## 2020-03-20 RX ADMIN — LIDOCAINE HYDROCHLORIDE 100 MG: 20 INJECTION, SOLUTION INTRAVENOUS at 10:40

## 2020-03-20 RX ADMIN — PROMETHAZINE HYDROCHLORIDE 12.5 MG: 25 INJECTION INTRAMUSCULAR; INTRAVENOUS at 15:38

## 2020-03-20 RX ADMIN — TILMANOCEPT 0.58 MILLICURIE: KIT at 08:57

## 2020-03-20 RX ADMIN — DIPHENHYDRAMINE HYDROCHLORIDE 6.25 MG: 50 INJECTION INTRAMUSCULAR; INTRAVENOUS at 10:59

## 2020-03-20 RX ADMIN — DEXAMETHASONE SODIUM PHOSPHATE 8 MG: 4 INJECTION, SOLUTION INTRAMUSCULAR; INTRAVENOUS at 10:41

## 2020-03-20 RX ADMIN — HYDROMORPHONE HYDROCHLORIDE 0.5 MG: 2 INJECTION, SOLUTION INTRAMUSCULAR; INTRAVENOUS; SUBCUTANEOUS at 12:24

## 2020-03-20 RX ADMIN — SCOPALAMINE 1 PATCH: 1 PATCH, EXTENDED RELEASE TRANSDERMAL at 10:42

## 2020-03-20 RX ADMIN — FENTANYL CITRATE 25 MCG: 50 INJECTION INTRAMUSCULAR; INTRAVENOUS at 11:04

## 2020-03-20 RX ADMIN — SODIUM CHLORIDE: 9 INJECTION, SOLUTION INTRAVENOUS at 18:33

## 2020-03-20 RX ADMIN — ROCURONIUM BROMIDE 50 MG: 10 INJECTION INTRAVENOUS at 10:40

## 2020-03-20 RX ADMIN — HYDROMORPHONE HYDROCHLORIDE 0.5 MG: 2 INJECTION, SOLUTION INTRAMUSCULAR; INTRAVENOUS; SUBCUTANEOUS at 12:30

## 2020-03-20 RX ADMIN — SODIUM CHLORIDE, POTASSIUM CHLORIDE, SODIUM LACTATE AND CALCIUM CHLORIDE: 600; 310; 30; 20 INJECTION, SOLUTION INTRAVENOUS at 10:33

## 2020-03-20 RX ADMIN — SUGAMMADEX 200 MG: 100 INJECTION, SOLUTION INTRAVENOUS at 11:53

## 2020-03-20 RX ADMIN — SODIUM CHLORIDE 500 ML: 9 INJECTION, SOLUTION INTRAVENOUS at 14:34

## 2020-03-20 RX ADMIN — FENTANYL CITRATE 50 MCG: 50 INJECTION INTRAMUSCULAR; INTRAVENOUS at 10:40

## 2020-03-20 RX ADMIN — PROPOFOL 120 MG: 10 INJECTION, EMULSION INTRAVENOUS at 10:40

## 2020-03-20 RX ADMIN — PROMETHAZINE HYDROCHLORIDE 6.25 MG: 25 INJECTION INTRAMUSCULAR; INTRAVENOUS at 14:34

## 2020-03-20 RX ADMIN — CEFAZOLIN SODIUM 2 G: 2 INJECTION, SOLUTION INTRAVENOUS at 10:52

## 2020-03-20 ASSESSMENT — PAIN SCALES - GENERAL
PAINLEVEL_OUTOF10: 1
PAINLEVEL_OUTOF10: 4
PAINLEVEL_OUTOF10: 7
PAINLEVEL_OUTOF10: 9
PAINLEVEL_OUTOF10: 3
PAINLEVEL_OUTOF10: 7
PAINLEVEL_OUTOF10: 3
PAINLEVEL_OUTOF10: 9

## 2020-03-20 ASSESSMENT — PAIN DESCRIPTION - LOCATION
LOCATION: BREAST

## 2020-03-20 ASSESSMENT — PULMONARY FUNCTION TESTS
PIF_VALUE: 18
PIF_VALUE: 23
PIF_VALUE: 17
PIF_VALUE: 18
PIF_VALUE: 20
PIF_VALUE: 8
PIF_VALUE: 17
PIF_VALUE: 17
PIF_VALUE: 21
PIF_VALUE: 17
PIF_VALUE: 22
PIF_VALUE: 21
PIF_VALUE: 8
PIF_VALUE: 21
PIF_VALUE: 21
PIF_VALUE: 1
PIF_VALUE: 17
PIF_VALUE: 21
PIF_VALUE: 21
PIF_VALUE: 1
PIF_VALUE: 24
PIF_VALUE: 21
PIF_VALUE: 2
PIF_VALUE: 2
PIF_VALUE: 17
PIF_VALUE: 23
PIF_VALUE: 23
PIF_VALUE: 21
PIF_VALUE: 22
PIF_VALUE: 21
PIF_VALUE: 17
PIF_VALUE: 17
PIF_VALUE: 20
PIF_VALUE: 23
PIF_VALUE: 7
PIF_VALUE: 17
PIF_VALUE: 22
PIF_VALUE: 21
PIF_VALUE: 22
PIF_VALUE: 17
PIF_VALUE: 22
PIF_VALUE: 1
PIF_VALUE: 1
PIF_VALUE: 17
PIF_VALUE: 17
PIF_VALUE: 21
PIF_VALUE: 21
PIF_VALUE: 22
PIF_VALUE: 25
PIF_VALUE: 24
PIF_VALUE: 20
PIF_VALUE: 21
PIF_VALUE: 0
PIF_VALUE: 23
PIF_VALUE: 21
PIF_VALUE: 21
PIF_VALUE: 19
PIF_VALUE: 17
PIF_VALUE: 23
PIF_VALUE: 22
PIF_VALUE: 21
PIF_VALUE: 2
PIF_VALUE: 21
PIF_VALUE: 1
PIF_VALUE: 21
PIF_VALUE: 20
PIF_VALUE: 21
PIF_VALUE: 21
PIF_VALUE: 20
PIF_VALUE: 21
PIF_VALUE: 18
PIF_VALUE: 17
PIF_VALUE: 22
PIF_VALUE: 21
PIF_VALUE: 22
PIF_VALUE: 21
PIF_VALUE: 17
PIF_VALUE: 21
PIF_VALUE: 20
PIF_VALUE: 20

## 2020-03-20 ASSESSMENT — PAIN DESCRIPTION - ORIENTATION
ORIENTATION: LEFT

## 2020-03-20 ASSESSMENT — PAIN DESCRIPTION - FREQUENCY
FREQUENCY: CONTINUOUS

## 2020-03-20 ASSESSMENT — PAIN DESCRIPTION - PAIN TYPE
TYPE: SURGICAL PAIN

## 2020-03-20 ASSESSMENT — LIFESTYLE VARIABLES: SMOKING_STATUS: 0

## 2020-03-20 ASSESSMENT — PAIN DESCRIPTION - DESCRIPTORS
DESCRIPTORS: BURNING

## 2020-03-20 ASSESSMENT — PAIN - FUNCTIONAL ASSESSMENT
PAIN_FUNCTIONAL_ASSESSMENT: PREVENTS OR INTERFERES SOME ACTIVE ACTIVITIES AND ADLS
PAIN_FUNCTIONAL_ASSESSMENT: 0-10

## 2020-03-20 ASSESSMENT — PAIN DESCRIPTION - ONSET: ONSET: ON-GOING

## 2020-03-20 ASSESSMENT — PAIN DESCRIPTION - PROGRESSION: CLINICAL_PROGRESSION: GRADUALLY WORSENING

## 2020-03-20 NOTE — ANESTHESIA PRE PROCEDURE
Department of Anesthesiology  Preprocedure Note       Name:  Anoop Crowe   Age:  78 y.o.  :  1950                                          MRN:  [id number]         Date:  3/20/2020      Surgeon: Monica Begum):  Maya Sheth MD    Procedure: LEFT BREAST LUMPECTOMY WITH NEEDLE LOC AND SENTINEL NODE BIOPSY POSSIBLE BIOZORB AND MEDIPORT INSERTION (Left Breast)    Medications prior to admission:   Prior to Admission medications    Medication Sig Start Date End Date Taking? Authorizing Provider   atenolol (TENORMIN) 100 MG tablet Take 1 tablet by mouth daily  Patient taking differently: Take 100 mg by mouth nightly  20  Yes Janak Kitchen MD   atorvastatin (LIPITOR) 10 MG tablet Take 1 tablet by mouth daily 19  Yes aJnak Kitchen MD   triamcinolone (KENALOG) 0.1 % cream  12/10/18  Yes CHARI Stein - CNP   Calcium Carb-Cholecalciferol (CALTRATE 600+D) 600-800 MG-UNIT TABS 1 tab twice per day 19  Yes Janak Kitchen MD   clobetasol (OLUX) 0.05 % foam  17  Yes Mohamud Hensley   ketoconazole (NIZORAL) 2 % shampoo  17  Yes Jonathanolrhona Friend, PA   losartan (COZAAR) 50 MG tablet Take 1 tablet by mouth daily 17  Yes Mendez Ramos MD   Multiple Vitamin (MULTIVITAMIN PO) Take  by mouth daily. Yes Historical Provider, MD   nitroGLYCERIN (NITROSTAT) 0.3 MG SL tablet Place 1 tablet under the tongue every 5 minutes as needed for Chest pain 18   Janak Kitchen MD   aspirin 81 MG tablet Take 81 mg by mouth daily.     Historical Provider, MD       Current medications:    Current Facility-Administered Medications   Medication Dose Route Frequency Provider Last Rate Last Dose    lactated ringers infusion   Intravenous Continuous Kvng Cano MD        lactated ringers infusion              Facility-Administered Medications Ordered in Other Encounters   Medication Dose Route Frequency Provider Last Rate Last Dose    technetium Tc 99m tilmanocept (LYMPHOSEEK) injection 0.58 kg) 175 lb (79.4 kg)   Height: 5' 4\" (1.626 m) 5' 4\" (1.626 m)                                              BP Readings from Last 3 Encounters:   03/20/20 (!) 189/73   03/10/20 (!) 176/66   02/28/20 120/80       NPO Status: Time of last liquid consumption: 2130                        Time of last solid consumption: 1800                        Date of last liquid consumption: 03/19/20                        Date of last solid food consumption: 03/19/20    BMI:   Wt Readings from Last 3 Encounters:   03/20/20 175 lb (79.4 kg)   03/10/20 175 lb 12.8 oz (79.7 kg)   02/28/20 177 lb 3.2 oz (80.4 kg)     Body mass index is 30.04 kg/m². CBC:   Lab Results   Component Value Date    WBC 10.1 03/09/2020    RBC 4.63 03/09/2020    HGB 14.4 03/09/2020    HCT 42.3 03/09/2020    MCV 91.4 03/09/2020    RDW 14.6 03/09/2020     03/09/2020       CMP:   Lab Results   Component Value Date     03/09/2020    K 4.8 03/09/2020    CL 99 03/09/2020    CO2 28 03/09/2020    BUN 15 03/09/2020    CREATININE 1.0 03/09/2020    GFRAA >60 03/09/2020    GFRAA >60 05/22/2012    AGRATIO 1.9 01/29/2020    LABGLOM 55 03/09/2020    GLUCOSE 79 03/09/2020    PROT 7.4 03/09/2020    PROT 7.0 05/22/2012    CALCIUM 10.2 03/09/2020    BILITOT 1.0 03/09/2020    ALKPHOS 61 03/09/2020    AST 27 03/09/2020    ALT 24 03/09/2020       POC Tests: No results for input(s): POCGLU, POCNA, POCK, POCCL, POCBUN, POCHEMO, POCHCT in the last 72 hours. Coags: No results found for: PROTIME, INR, APTT    HCG (If Applicable): No results found for: PREGTESTUR, PREGSERUM, HCG, HCGQUANT     ABGs: No results found for: PHART, PO2ART, CLN7BTT, OOD1XPA, BEART, F2KDNUGC     Type & Screen (If Applicable):  No results found for: LABABO, [address]    Anesthesia Evaluation  Patient summary reviewed and Nursing notes reviewed   history of anesthetic complications: PONV.   Airway: Mallampati: II  TM distance: >3 FB   Neck ROM: full  Mouth opening: > = 3 FB Dental: normal exam Pulmonary:       (-) not a current smoker                           Cardiovascular:  Exercise tolerance: good (>4 METS),   (+) hypertension:, past MI: > 6 months, CAD:,     (-)  angina          Echocardiogram reviewed         Beta Blocker:  Not on Beta Blocker and Dose within 24 Hrs      ROS comment: eCHO -ef 70%     Neuro/Psych:      (-) seizures and CVA           GI/Hepatic/Renal:   (+) liver disease:,           Endo/Other:        (-) diabetes mellitus               Abdominal:           Vascular:                                    Anesthesia Plan      general     ASA 3       Induction: intravenous. Anesthetic plan and risks discussed with patient. Plan discussed with CRNA and attending.     Attending anesthesiologist reviewed and agrees with Pre Eval content              Damon Loco MD   3/20/2020

## 2020-03-20 NOTE — OP NOTE
Operative Report      Name:  Israel Lozano   MRN:  [id number]  Date:  3/20/2020        PREOPERATIVE DIAGNOSIS: left breast cancer. POSTOPERATIVE DIAGNOSIS: same    PROCEDURE:left partial mastectomy, left axillary sentinel lymph node biopsy, insertion mediport    SURGEON: Vilma Brooks    ASSISTANT: First Assistant: LORENE Quinn  The use of a first assistant was necessary for the proper positioning, prepping, and draping of the patient, intraoperative retraction and suctioning for visualization, passing sutures and implants, stapling bowel and vessels using devises when necessary. ANESTHESIA: General.    INDICATIONS FOR PROCEDURE: The patient is a 78 y.o. female who had  presented with breast cancer. She is here now for partial mastectomy and axillary sentinel lymph node biopsy. The risks, benefits and alternatives were discussed with the  patient. Questions were answered and she is agreeable to proceed. DESCRIPTION OF OPERATION: The patient was brought to the operating room and  placed on the OR table in the supine position. General anesthesia was  obtained and the left and right breast and axillary region were prepped and draped in the usual sterile fashion. The patient had radioisotope injection preoperatively per nuclear medicine. A transverse incision was made in the left axilla, and carried down thru the subcutaneous tissues. The axillary fascia was divided. The gamma probe was used to identify a sentinel lymph node within the axillary region. This node was removed and vessels and lymphatics were clipped and divided. The residual gamma probe activity within the axillary region was minimal. There were no other enlarged lymph nodes palpated within the axillary region. Hemostasis was assured. The wounds were closed with a deep layer of 3-0 vicryl and skin was re-approximated with 5-0 vicryl and covered with skin glue.      An incision was made in the 3-5 o'clock on the left

## 2020-03-20 NOTE — ANESTHESIA PROCEDURE NOTES
Peripheral Block    Patient location during procedure: OR  Start time: 3/20/2020 11:59 AM  End time: 3/20/2020 12:04 PM  Staffing  Anesthesiologist: Rex Hylton MD  Resident/CRNA: CHARI Cleveland CRNA  Performed: resident/CRNA   Preanesthetic Checklist  Completed: site marked, surgical consent, pre-op evaluation, timeout performed, IV checked, risks and benefits discussed, monitors and equipment checked, anesthesia consent given and patient being monitored  Peripheral Block  Patient position: supine  Prep: ChloraPrep  Patient monitoring: cardiac monitor, continuous pulse ox, continuous capnometry, frequent blood pressure checks and IV access  Block type: PECS I and PECS II  Laterality: left  Injection technique: single-shot  Procedures: ultrasound guided  Local infiltration: ropivacaine  Infiltration strength: 3.75 %  Dose: 40 mL  Provider prep: mask and sterile gloves  Local infiltration: ropivacaine  Needle  Needle type: combined needle/nerve stimulator   Needle gauge: 20 G  Needle length: 8 cm  Needle localization: ultrasound guidance  Assessment  Injection assessment: negative aspiration for heme, no paresthesia on injection and local visualized surrounding nerve on ultrasound  Paresthesia pain: none  Slow fractionated injection: yes  Hemodynamics: stable  Reason for block: post-op pain management

## 2020-03-20 NOTE — H&P
(COZAAR) 50 MG tablet, Take 1 tablet by mouth daily  aspirin 81 MG tablet, Take 81 mg by mouth daily. Multiple Vitamin (MULTIVITAMIN PO), Take  by mouth daily. Allergies:  Codeine    Social History:   Social History     Socioeconomic History    Marital status:       Spouse name: Not on file    Number of children: Not on file    Years of education: Not on file    Highest education level: Not on file   Occupational History    Occupation:    Social Needs    Financial resource strain: Not on file    Food insecurity     Worry: Not on file     Inability: Not on file   [employer] needs     Medical: Not on file     Non-medical: Not on file   Tobacco Use    Smoking status: Never Smoker    Smokeless tobacco: Never Used   Substance and Sexual Activity    Alcohol use: No    Drug use: No    Sexual activity: Not on file   Lifestyle    Physical activity     Days per week: Not on file     Minutes per session: Not on file    Stress: Not on file   Relationships    Social connections     Talks on phone: Not on file     Gets together: Not on file     Attends [employer] service: Not on file     Active member of club or organization: Not on file     Attends meetings of clubs or organizations: Not on file     Relationship status: Not on file    Intimate partner violence     Fear of current or ex partner: Not on file     Emotionally abused: Not on file     Physically abused: Not on file     Forced sexual activity: Not on file   Other Topics Concern    Not on file   Social History Narrative    Not on file       Family History:       Problem Relation Age of Onset    Stroke Father     Coronary Art Dis Father     High Blood Pressure Father     Heart Attack Brother 72    High Blood Pressure Mother        REVIEW OF SYSTEMS:    CONSTITUTIONAL:  negative for  fevers, chills and weight loss  HEENT:  negative  CARDIOVASCULAR:  negative for  chest pain, dyspnea, palpitations, edema  BREAST:  Breast mass of left breast, no tenderness or nipple discharge or retraction  GASTROINTESTINAL:  negative for nausea, vomiting, change in bowel habits, diarrhea, constipation, abdominal pain and abdominal distention  GENITOURINARY:  negative  HEMATOLOGIC/LYMPHATIC:  negative for easy bruising, bleeding and lymphadenopathy  ENDOCRINE:  negative    PHYSICAL EXAM:    VITALS:  Ht 5' 4\" (1.626 m)   Wt 175 lb (79.4 kg)   LMP 01/18/2000   Breastfeeding No   BMI 30.04 kg/m²   CONSTITUTIONAL:  awake, alert, no apparent distress  ENT:  normocepalic, without obvious abnormality  NECK:  supple, symmetrical, trachea midline  LUNGS:  clear to auscultation  CARDIOVASCULAR:  regular rate and rhythm  BREAST: No tenderness bilaterally. Left breast mass measuring about 3 cm at 2 o'clock.   No nipple discharge or retraction  MUSCULOSKELETAL:  0+ pitting edema lower extremities  NEUROLOGIC:  Mental Status Exam:  Level of Alertness:   awake  Orientation:   person, place, time    DATA:  CBC with Differential:    Lab Results   Component Value Date    WBC 10.1 03/09/2020    RBC 4.63 03/09/2020    HGB 14.4 03/09/2020    HCT 42.3 03/09/2020     03/09/2020    MCV 91.4 03/09/2020    MCH 31.1 03/09/2020    MCHC 34.0 03/09/2020    RDW 14.6 03/09/2020    SEGSPCT 63.5 03/09/2020    LYMPHOPCT 21.1 03/09/2020    MONOPCT 10.1 03/09/2020    BASOPCT 1.1 03/09/2020    MONOSABS 1.0 03/09/2020    LYMPHSABS 2.1 03/09/2020    EOSABS 0.4 03/09/2020    BASOSABS 0.1 03/09/2020    DIFFTYPE AUTOMATED DIFFERENTIAL 03/09/2020     CMP:    Lab Results   Component Value Date     03/09/2020    K 4.8 03/09/2020    CL 99 03/09/2020    CO2 28 03/09/2020    BUN 15 03/09/2020    CREATININE 1.0 03/09/2020    GFRAA >60 03/09/2020    GFRAA >60 05/22/2012    AGRATIO 1.9 01/29/2020    LABGLOM 55 03/09/2020    GLUCOSE 79 03/09/2020    PROT 7.4 03/09/2020    PROT 7.0 05/22/2012    LABALBU 4.8 03/09/2020    CALCIUM 10.2 03/09/2020    BILITOT 1.0 03/09/2020    ALKPHOS 61 03/09/2020    AST 27 03/09/2020    ALT 24 03/09/2020       Radiology Reviewed  Impression   1. A 3.3 cm left breast mass is highly suspicious for malignancy.  Recommend   ultrasound-guided biopsy for further evaluation. 2. No mammographic evidence of malignancy in the right breast.     ASSESSMENT AND PLAN: Invasive ductal carcinoma, triple negative - Will plan lumpectomy with needle localization and sentinel node biopsy. Will also place mediport. The risks, benefits and alternatives to the planned procedure were discussed. Patient expressed an understanding and is willing to proceed.     Kota Redding, CHARI-CNP

## 2020-03-21 VITALS
HEART RATE: 50 BPM | HEIGHT: 64 IN | WEIGHT: 175 LBS | BODY MASS INDEX: 29.88 KG/M2 | SYSTOLIC BLOOD PRESSURE: 138 MMHG | RESPIRATION RATE: 16 BRPM | DIASTOLIC BLOOD PRESSURE: 63 MMHG | TEMPERATURE: 98.3 F | OXYGEN SATURATION: 98 %

## 2020-03-21 PROCEDURE — G0378 HOSPITAL OBSERVATION PER HR: HCPCS

## 2020-03-21 PROCEDURE — 6360000002 HC RX W HCPCS: Performed by: SURGERY

## 2020-03-21 PROCEDURE — 96374 THER/PROPH/DIAG INJ IV PUSH: CPT

## 2020-03-21 PROCEDURE — 2580000003 HC RX 258: Performed by: SURGERY

## 2020-03-21 PROCEDURE — 99024 POSTOP FOLLOW-UP VISIT: CPT | Performed by: SURGERY

## 2020-03-21 RX ADMIN — SODIUM CHLORIDE: 9 INJECTION, SOLUTION INTRAVENOUS at 04:12

## 2020-03-21 RX ADMIN — KETOROLAC TROMETHAMINE 15 MG: 30 INJECTION, SOLUTION INTRAMUSCULAR; INTRAVENOUS at 04:12

## 2020-03-21 ASSESSMENT — PAIN SCALES - GENERAL
PAINLEVEL_OUTOF10: 5
PAINLEVEL_OUTOF10: 0

## 2020-03-21 ASSESSMENT — PAIN DESCRIPTION - ORIENTATION: ORIENTATION: LEFT

## 2020-03-21 ASSESSMENT — PAIN DESCRIPTION - DESCRIPTORS: DESCRIPTORS: BURNING

## 2020-03-21 ASSESSMENT — PAIN DESCRIPTION - ONSET: ONSET: AWAKENED FROM SLEEP

## 2020-03-21 ASSESSMENT — PAIN DESCRIPTION - PROGRESSION: CLINICAL_PROGRESSION: RAPIDLY WORSENING

## 2020-03-21 ASSESSMENT — PAIN DESCRIPTION - LOCATION: LOCATION: BREAST

## 2020-03-21 ASSESSMENT — PAIN DESCRIPTION - FREQUENCY: FREQUENCY: INTERMITTENT

## 2020-03-21 ASSESSMENT — PAIN DESCRIPTION - PAIN TYPE: TYPE: SURGICAL PAIN

## 2020-03-21 NOTE — DISCHARGE SUMMARY
Physician Discharge Summary     Patient ID:  Rigoberto Blanchard    [id number]    78 y.o.    1950    Admit date: 3/20/2020    Discharge date and time: No discharge date for patient encounter. Admitting Physician: Santos Zamora MD     Discharge Physician: Same    Admission Diagnoses: Postoperative nausea [R11.0, Z98.890]    Discharge Diagnoses: Same    Admission Condition: fair    Discharged Condition: good    Indication for Admission: Postop nausea    Hospital Course: Pt admitted after left lumpectomy and sentinel node biopsy due to persistent postop nausea. She is feeling better this am and is tolerating po. She can go home. Consults: none    Significant Diagnostic Studies: none    Treatments: IV hydration and promthazine    Disposition: home    DIAGNOSIS:  Active Hospital Problems    Diagnosis Date Noted    Postoperative nausea [R11.0, Z98.890] 03/20/2020    Malignant neoplasm of female breast ([facility] [address] [C50.919]     Coronary artery disease involving native coronary artery of native heart without angina pectoris [I25.10] 08/09/2018    Obesity (BMI 30.0-34. 9) [E66.9] 12/06/2017    Essential hypertension [I10] 02/09/2012   . Patient Instructions: Activity: activity as tolerated  Diet: regular diet  Wound Care: none needed    Follow-up with me per appointment.     Signed:  Ema Brooks  3/21/2020  10:11 AM

## 2020-03-21 NOTE — PROGRESS NOTES
Pt with no complaints of nausea through the night. Pt ambulating to bathroom x1 assist with no issue. x2 surgical incisions clean dry and intact on left breast and left axillary area. toradol given once as ordered when requested. Bed low. Call light in reach.       Electronically signed by Keyon Carmichael RN on 3/21/20 at 6:29 AM EDT

## 2020-03-23 ENCOUNTER — CLINICAL DOCUMENTATION (OUTPATIENT)
Dept: CASE MANAGEMENT | Age: 70
End: 2020-03-23

## 2020-03-23 ENCOUNTER — TELEPHONE (OUTPATIENT)
Dept: INTERNAL MEDICINE CLINIC | Age: 70
End: 2020-03-23

## 2020-03-23 ENCOUNTER — CARE COORDINATION (OUTPATIENT)
Dept: CARE COORDINATION | Age: 70
End: 2020-03-23

## 2020-03-23 NOTE — CARE COORDINATION
COVID-19 Screening Initial Follow-up Note    Patient contacted regarding COVID-19  risk. Care Transition Nurse/ Ambulatory Care Manager contacted the patient by telephone to perform post discharge assessment. Verified name and  with patient as identifiers. Provided introduction to self, and explanation of the CTN/ACM role, and reason for call due to risk factors for infection and/or exposure to COVID-19. Symptoms reviewed with patient who verbalized the following symptoms: fever, fatigue, pain or aching joints, cough, shortness of breath, contusion or unusual change in mental status, chills or shaking, sweating, fast heart rate, fast breathing, \"dizziness/lightheadedness, less urine output, cold, clammy, and pale skin, low body temperature and no new/worsening symptoms       Due to no new or worsening symptoms encounter was not routed to provider for escalation. Patient has following risk factors of: immunocompromised and no known risk factors. CTN/ACM reviewed discharge instructions, medical action plan and red flags such as increased shortness of breath, increasing fever and signs of decompensation with patient who verbalized understanding. Discussed exposure protocols and quarantine with CDC Guidelines What to do if you are sick with coronavirus disease 2019 Patient who was given an opportunity for questions and concerns. The patient agrees to contact the [facility] exposure line [phone], local [facility] department [place] Department of Health: ([phone]) and PCP office for questions related to their healthcare. CTN/ACM provided contact information for future reference. Reviewed and educated patient on any new and changed medications related to discharge diagnosis     Plan for follow-up call in 14 days based on severity of symptoms and risk factors  ACM reviewed COVID 19 symptoms to be aware of and provided COVID pt line. ..  [phone] as well as clinic location at [facility] and the phone #, [phone] to make an appointment if needed. ACM encouraged pt to call with any questions or needs. Contact info provided. Keyla Rodriguez RN  Ambulatory Care Manager  [phone]  [email or web address]

## 2020-03-24 ENCOUNTER — TELEPHONE (OUTPATIENT)
Dept: FAMILY MEDICINE CLINIC | Age: 70
End: 2020-03-24

## 2020-03-24 NOTE — TELEPHONE ENCOUNTER
Patient daughter stated that her mother is doing well, all of the cancer is out of the left breast. They are waiting to see if she has to do chemo

## 2020-03-26 ENCOUNTER — OFFICE VISIT (OUTPATIENT)
Dept: SURGERY | Age: 70
End: 2020-03-26

## 2020-03-26 VITALS
TEMPERATURE: 98 F | DIASTOLIC BLOOD PRESSURE: 73 MMHG | RESPIRATION RATE: 16 BRPM | SYSTOLIC BLOOD PRESSURE: 159 MMHG | HEART RATE: 57 BPM

## 2020-03-26 PROCEDURE — 99024 POSTOP FOLLOW-UP VISIT: CPT | Performed by: SURGERY

## 2020-03-26 NOTE — PROGRESS NOTES
Azalia Harada is 1 week post-op: lumpectomy and sentinel node biopsy. There is residual tumor anteriorly, medially and possibly inferiorly. The sentinel node was negative. Tumor size is 2.8 cm and it is triple negative. Presenting for routine follow-up. S:  Doing well. Patient has noted no excessive redness and swelling. SHe has extensive bruising at the lumpectomy site. O:  Wound healing well. No drainage as expected. A:  Satisfactory course. P: Subcuticular closure intact. .  Patient to return in 2 weeks. Will plan to re-excise margins Monday. Patient is to return as needed for redness, swelling, discomfort, or any concern about her  surgery.

## 2020-03-27 ENCOUNTER — ANESTHESIA EVENT (OUTPATIENT)
Dept: OPERATING ROOM | Age: 70
End: 2020-03-27
Payer: MEDICARE

## 2020-03-27 NOTE — ANESTHESIA PRE PROCEDURE
Department of Anesthesiology  Preprocedure Note       Name:  Edwar Alex   Age:  78 y.o.  :  1950                                          MRN:  [id number]         Date:  3/27/2020      Surgeon: [place] España):  Colin Meneses MD    Procedure: BREAST LESION RE EXCISION LEFT LUMPECTOMY SITE (Left )    Medications prior to admission:   Prior to Admission medications    Medication Sig Start Date End Date Taking? Authorizing Provider   promethazine (PHENERGAN) 12.5 MG tablet Take 1 tablet by mouth 3 times daily as needed for Nausea 3/20/20 3/27/20  [place] Later, APRN - CNP   ketorolac (TORADOL) 10 MG tablet Take 1 tablet by mouth every 6 hours as needed for Pain 3/20/20   [place] Later, APRN - CNP   atenolol (TENORMIN) 100 MG tablet Take 1 tablet by mouth daily  Patient taking differently: Take 100 mg by mouth nightly  20   Santa Hasnen MD   atorvastatin (LIPITOR) 10 MG tablet Take 1 tablet by mouth daily 19   Santa Hansen MD   triamcinolone (KENALOG) 0.1 % cream  12/10/18   Gareth Moreno APRN - CNP   Calcium Carb-Cholecalciferol (CALTRATE 600+D) 600-800 MG-UNIT TABS 1 tab twice per day 19   Santa Hansen MD   nitroGLYCERIN (NITROSTAT) 0.3 MG SL tablet Place 1 tablet under the tongue every 5 minutes as needed for Chest pain 18   Santa Hansen MD   clobetasol (OLUX) 0.05 % foam  17   HIGINIO Saleh   ketoconazole (NIZORAL) 2 % shampoo  17   HIGINIO Saleh   losartan (COZAAR) 50 MG tablet Take 1 tablet by mouth daily 17   Fabiana Cervantes MD   aspirin 81 MG tablet Take 81 mg by mouth daily. Historical Provider, MD   Multiple Vitamin (MULTIVITAMIN PO) Take  by mouth daily. Historical Provider, MD       Current medications:    No current facility-administered medications for this encounter.       Current Outpatient Medications   Medication Sig Dispense Refill    promethazine (PHENERGAN) 12.5 MG tablet Take 1 tablet by mouth 3 times daily as needed for  Wears glasses        Past Surgical History:        Procedure Laterality Date    BREAST BIOPSY Left 2020    BREAST BIOPSY Left 2020    sentinal node, partial mastectomy left    BREAST LUMPECTOMY Left 3/20/2020    LEFT BREAST LUMPECTOMY WITH NEEDLE LOC AND SENTINEL NODE BIOPSY performed by Yessy Guthrie MD at [address] TEST  2009    treadmill, Dr. Akash Vasquez Right     right     SECTION      COLONOSCOPY          COLONOSCOPY  2015    diverticulosis, internal hemorroids. repeat 5 years. Lizet Genny PORT SURGERY Right 2020    PORT SURGERY Right 3/20/2020    PORT INSERTION performed by Yessy Guthrie MD at [address] Right 2016    TUBAL LIGATION         Social History:    Social History     Tobacco Use    Smoking status: Never Smoker    Smokeless tobacco: Never Used   Substance Use Topics    Alcohol use: No                                Counseling given: Not Answered      Vital Signs (Current): There were no vitals filed for this visit.                                            BP Readings from Last 3 Encounters:   20 (!) 159/73   20 138/63   20 (!) 163/82       NPO Status:                                                                                 BMI:   Wt Readings from Last 3 Encounters:   20 175 lb (79.4 kg)   03/10/20 175 lb 12.8 oz (79.7 kg)   20 177 lb 3.2 oz (80.4 kg)     There is no height or weight on file to calculate BMI.    CBC:   Lab Results   Component Value Date    WBC 10.1 2020    RBC 4.63 2020    HGB 14.4 2020    HCT 42.3 2020    MCV 91.4 2020    RDW 14.6 2020     2020       CMP:   Lab Results   Component Value Date     2020    K 4.8 2020    CL 99 2020    CO2 28 2020    BUN 15 2020    CREATININE 1.0 2020    GFRAA >60 03/09/2020    GFRAA >60 05/22/2012    AGRATIO 1.9 01/29/2020    LABGLOM 55 03/09/2020    GLUCOSE 79 03/09/2020    PROT 7.4 03/09/2020    PROT 7.0 05/22/2012    CALCIUM 10.2 03/09/2020    BILITOT 1.0 03/09/2020    ALKPHOS 61 03/09/2020    AST 27 03/09/2020    ALT 24 03/09/2020       Anesthesia Evaluation  Patient summary reviewed and Nursing notes reviewed   history of anesthetic complications: PONV. Airway: Mallampati: II  TM distance: >3 FB   Neck ROM: full   Dental: normal exam         Pulmonary:normal exam                              ROS comment: Non smoker    CXR: 3/20/2020  1. The endotracheal tube tip is located at the level of the wilian.  This could be retracted 2 cm for more optimal placement. 2. Low lung volumes with bibasilar atelectasis, increased.          Cardiovascular:    (+) hypertension (on beta blocker and ASA): mild, valvular problems/murmurs: MR, past MI (unknown, reports was from EKG):, CAD:, hyperlipidemia               ROS comment: Echo 2/2020  EF 51%  Mild-mod MR     Neuro/Psych:               GI/Hepatic/Renal:   (+) liver disease (hepatic steatosis):,           Endo/Other:    (+) malignancy/cancer (left breast cancer, bx 2/25/2020, Followed by Dr Catherine Longoria.  :left partial mastectomy, left axillary sentinel lymph node biopsy, insertion mediport 3/20/2020 per Dr. Edrick Hodgkins). Pt had no PAT visit       Abdominal:           Vascular:                                    Anesthesia Plan      general     ASA 3       Induction: intravenous. MIPS: Postoperative opioids intended. Anesthetic plan and risks discussed with patient. Plan discussed with CRNA.     Attending anesthesiologist reviewed and agrees with Pre Eval content              Marielena Rodriguez, APRN - CNP   3/27/2020

## 2020-03-30 ENCOUNTER — HOSPITAL ENCOUNTER (OUTPATIENT)
Age: 70
Setting detail: OUTPATIENT SURGERY
Discharge: HOME | End: 2020-03-30
Attending: SURGERY | Admitting: SURGERY
Payer: MEDICARE

## 2020-03-30 ENCOUNTER — ANESTHESIA (OUTPATIENT)
Dept: OPERATING ROOM | Age: 70
End: 2020-03-30
Payer: MEDICARE

## 2020-03-30 VITALS
BODY MASS INDEX: 29.88 KG/M2 | WEIGHT: 175 LBS | HEART RATE: 60 BPM | SYSTOLIC BLOOD PRESSURE: 168 MMHG | RESPIRATION RATE: 16 BRPM | TEMPERATURE: 97.1 F | OXYGEN SATURATION: 95 % | HEIGHT: 64 IN | DIASTOLIC BLOOD PRESSURE: 74 MMHG

## 2020-03-30 VITALS
DIASTOLIC BLOOD PRESSURE: 99 MMHG | RESPIRATION RATE: 6 BRPM | SYSTOLIC BLOOD PRESSURE: 152 MMHG | OXYGEN SATURATION: 95 %

## 2020-03-30 PROCEDURE — 19301 PARTIAL MASTECTOMY: CPT | Performed by: SURGERY

## 2020-03-30 PROCEDURE — 7100000001 HC PACU RECOVERY - ADDTL 15 MIN: Performed by: SURGERY

## 2020-03-30 PROCEDURE — 3700000000 HC ANESTHESIA ATTENDED CARE: Performed by: SURGERY

## 2020-03-30 PROCEDURE — 2500000003 HC RX 250 WO HCPCS: Performed by: NURSE ANESTHETIST, CERTIFIED REGISTERED

## 2020-03-30 PROCEDURE — 3600000002 HC SURGERY LEVEL 2 BASE: Performed by: SURGERY

## 2020-03-30 PROCEDURE — 76942 ECHO GUIDE FOR BIOPSY: CPT | Performed by: NURSE ANESTHETIST, CERTIFIED REGISTERED

## 2020-03-30 PROCEDURE — 88307 TISSUE EXAM BY PATHOLOGIST: CPT

## 2020-03-30 PROCEDURE — 6360000002 HC RX W HCPCS: Performed by: NURSE ANESTHETIST, CERTIFIED REGISTERED

## 2020-03-30 PROCEDURE — 3600000012 HC SURGERY LEVEL 2 ADDTL 15MIN: Performed by: SURGERY

## 2020-03-30 PROCEDURE — 2709999900 HC NON-CHARGEABLE SUPPLY: Performed by: SURGERY

## 2020-03-30 PROCEDURE — 2580000003 HC RX 258: Performed by: NURSE ANESTHETIST, CERTIFIED REGISTERED

## 2020-03-30 PROCEDURE — 19301 PARTIAL MASTECTOMY: CPT | Performed by: NURSE PRACTITIONER

## 2020-03-30 PROCEDURE — 7100000011 HC PHASE II RECOVERY - ADDTL 15 MIN: Performed by: SURGERY

## 2020-03-30 PROCEDURE — 2720000010 HC SURG SUPPLY STERILE: Performed by: SURGERY

## 2020-03-30 PROCEDURE — 7100000010 HC PHASE II RECOVERY - FIRST 15 MIN: Performed by: SURGERY

## 2020-03-30 PROCEDURE — 3700000001 HC ADD 15 MINUTES (ANESTHESIA): Performed by: SURGERY

## 2020-03-30 PROCEDURE — 6360000002 HC RX W HCPCS: Performed by: ANESTHESIOLOGY

## 2020-03-30 PROCEDURE — 2580000003 HC RX 258

## 2020-03-30 PROCEDURE — 7100000000 HC PACU RECOVERY - FIRST 15 MIN: Performed by: SURGERY

## 2020-03-30 RX ORDER — KETAMINE HYDROCHLORIDE 10 MG/ML
INJECTION, SOLUTION INTRAMUSCULAR; INTRAVENOUS PRN
Status: DISCONTINUED | OUTPATIENT
Start: 2020-03-30 | End: 2020-03-30 | Stop reason: SDUPTHER

## 2020-03-30 RX ORDER — SODIUM CHLORIDE, SODIUM LACTATE, POTASSIUM CHLORIDE, CALCIUM CHLORIDE 600; 310; 30; 20 MG/100ML; MG/100ML; MG/100ML; MG/100ML
INJECTION, SOLUTION INTRAVENOUS ONCE
Status: COMPLETED | OUTPATIENT
Start: 2020-03-30 | End: 2020-03-30

## 2020-03-30 RX ORDER — PROMETHAZINE HYDROCHLORIDE 25 MG/ML
INJECTION, SOLUTION INTRAMUSCULAR; INTRAVENOUS PRN
Status: DISCONTINUED | OUTPATIENT
Start: 2020-03-30 | End: 2020-03-30 | Stop reason: SDUPTHER

## 2020-03-30 RX ORDER — KETOROLAC TROMETHAMINE 10 MG/1
10 TABLET, FILM COATED ORAL EVERY 6 HOURS PRN
Qty: 20 TABLET | Refills: 0 | Status: SHIPPED | OUTPATIENT
Start: 2020-03-30 | End: 2020-09-23

## 2020-03-30 RX ORDER — LIDOCAINE HYDROCHLORIDE 20 MG/ML
INJECTION, SOLUTION INTRAVENOUS PRN
Status: DISCONTINUED | OUTPATIENT
Start: 2020-03-30 | End: 2020-03-30 | Stop reason: SDUPTHER

## 2020-03-30 RX ORDER — SODIUM CHLORIDE, SODIUM LACTATE, POTASSIUM CHLORIDE, CALCIUM CHLORIDE 600; 310; 30; 20 MG/100ML; MG/100ML; MG/100ML; MG/100ML
INJECTION, SOLUTION INTRAVENOUS
Status: COMPLETED
Start: 2020-03-30 | End: 2020-03-30

## 2020-03-30 RX ORDER — HYDRALAZINE HYDROCHLORIDE 20 MG/ML
5 INJECTION INTRAMUSCULAR; INTRAVENOUS EVERY 10 MIN PRN
Status: DISCONTINUED | OUTPATIENT
Start: 2020-03-30 | End: 2020-03-30 | Stop reason: HOSPADM

## 2020-03-30 RX ORDER — ONDANSETRON 2 MG/ML
4 INJECTION INTRAMUSCULAR; INTRAVENOUS
Status: DISCONTINUED | OUTPATIENT
Start: 2020-03-30 | End: 2020-03-30 | Stop reason: HOSPADM

## 2020-03-30 RX ORDER — PROPOFOL 10 MG/ML
INJECTION, EMULSION INTRAVENOUS CONTINUOUS PRN
Status: DISCONTINUED | OUTPATIENT
Start: 2020-03-30 | End: 2020-03-30 | Stop reason: SDUPTHER

## 2020-03-30 RX ORDER — SCOLOPAMINE TRANSDERMAL SYSTEM 1 MG/1
PATCH, EXTENDED RELEASE TRANSDERMAL
Status: DISCONTINUED
Start: 2020-03-30 | End: 2020-03-30 | Stop reason: HOSPADM

## 2020-03-30 RX ORDER — DEXAMETHASONE SODIUM PHOSPHATE 4 MG/ML
INJECTION, SOLUTION INTRA-ARTICULAR; INTRALESIONAL; INTRAMUSCULAR; INTRAVENOUS; SOFT TISSUE PRN
Status: DISCONTINUED | OUTPATIENT
Start: 2020-03-30 | End: 2020-03-30 | Stop reason: SDUPTHER

## 2020-03-30 RX ORDER — FENTANYL CITRATE 50 UG/ML
25 INJECTION, SOLUTION INTRAMUSCULAR; INTRAVENOUS EVERY 5 MIN PRN
Status: DISCONTINUED | OUTPATIENT
Start: 2020-03-30 | End: 2020-03-30 | Stop reason: HOSPADM

## 2020-03-30 RX ORDER — ROCURONIUM BROMIDE 10 MG/ML
INJECTION, SOLUTION INTRAVENOUS PRN
Status: DISCONTINUED | OUTPATIENT
Start: 2020-03-30 | End: 2020-03-30 | Stop reason: SDUPTHER

## 2020-03-30 RX ORDER — DIPHENHYDRAMINE HYDROCHLORIDE 50 MG/ML
INJECTION INTRAMUSCULAR; INTRAVENOUS PRN
Status: DISCONTINUED | OUTPATIENT
Start: 2020-03-30 | End: 2020-03-30 | Stop reason: SDUPTHER

## 2020-03-30 RX ORDER — ONDANSETRON 2 MG/ML
INJECTION INTRAMUSCULAR; INTRAVENOUS PRN
Status: DISCONTINUED | OUTPATIENT
Start: 2020-03-30 | End: 2020-03-30 | Stop reason: SDUPTHER

## 2020-03-30 RX ORDER — FENTANYL CITRATE 50 UG/ML
INJECTION, SOLUTION INTRAMUSCULAR; INTRAVENOUS PRN
Status: DISCONTINUED | OUTPATIENT
Start: 2020-03-30 | End: 2020-03-30 | Stop reason: SDUPTHER

## 2020-03-30 RX ORDER — SODIUM CHLORIDE, SODIUM LACTATE, POTASSIUM CHLORIDE, CALCIUM CHLORIDE 600; 310; 30; 20 MG/100ML; MG/100ML; MG/100ML; MG/100ML
INJECTION, SOLUTION INTRAVENOUS CONTINUOUS PRN
Status: DISCONTINUED | OUTPATIENT
Start: 2020-03-30 | End: 2020-03-30 | Stop reason: SDUPTHER

## 2020-03-30 RX ADMIN — DIPHENHYDRAMINE HYDROCHLORIDE 6.25 MG: 50 INJECTION INTRAMUSCULAR; INTRAVENOUS at 07:48

## 2020-03-30 RX ADMIN — SODIUM CHLORIDE, POTASSIUM CHLORIDE, SODIUM LACTATE AND CALCIUM CHLORIDE: 600; 310; 30; 20 INJECTION, SOLUTION INTRAVENOUS at 07:38

## 2020-03-30 RX ADMIN — SODIUM CHLORIDE, SODIUM LACTATE, POTASSIUM CHLORIDE, CALCIUM CHLORIDE: 600; 310; 30; 20 INJECTION, SOLUTION INTRAVENOUS at 09:00

## 2020-03-30 RX ADMIN — SODIUM CHLORIDE, POTASSIUM CHLORIDE, SODIUM LACTATE AND CALCIUM CHLORIDE: 600; 310; 30; 20 INJECTION, SOLUTION INTRAVENOUS at 06:33

## 2020-03-30 RX ADMIN — ONDANSETRON 4 MG: 2 INJECTION INTRAMUSCULAR; INTRAVENOUS at 07:46

## 2020-03-30 RX ADMIN — ROCURONIUM BROMIDE 50 MG: 10 INJECTION INTRAVENOUS at 07:49

## 2020-03-30 RX ADMIN — SODIUM CHLORIDE, POTASSIUM CHLORIDE, SODIUM LACTATE AND CALCIUM CHLORIDE: 600; 310; 30; 20 INJECTION, SOLUTION INTRAVENOUS at 09:00

## 2020-03-30 RX ADMIN — LIDOCAINE HYDROCHLORIDE 100 MG: 20 INJECTION, SOLUTION INTRAVENOUS at 07:46

## 2020-03-30 RX ADMIN — KETAMINE HYDROCHLORIDE 40 MG: 10 INJECTION INTRAMUSCULAR; INTRAVENOUS at 07:50

## 2020-03-30 RX ADMIN — PROMETHAZINE HYDROCHLORIDE 12.5 MG: 25 INJECTION INTRAMUSCULAR; INTRAVENOUS at 08:06

## 2020-03-30 RX ADMIN — SODIUM CHLORIDE, SODIUM LACTATE, POTASSIUM CHLORIDE, CALCIUM CHLORIDE: 600; 310; 30; 20 INJECTION, SOLUTION INTRAVENOUS at 06:33

## 2020-03-30 RX ADMIN — FENTANYL CITRATE 50 MCG: 50 INJECTION INTRAMUSCULAR; INTRAVENOUS at 07:46

## 2020-03-30 RX ADMIN — FENTANYL CITRATE 25 MCG: 50 INJECTION, SOLUTION INTRAMUSCULAR; INTRAVENOUS at 09:02

## 2020-03-30 RX ADMIN — DEXAMETHASONE SODIUM PHOSPHATE 8 MG: 4 INJECTION, SOLUTION INTRAMUSCULAR; INTRAVENOUS at 07:50

## 2020-03-30 RX ADMIN — PROPOFOL 150 MCG/KG/MIN: 10 INJECTION, EMULSION INTRAVENOUS at 07:50

## 2020-03-30 RX ADMIN — SUGAMMADEX 159 MG: 100 INJECTION, SOLUTION INTRAVENOUS at 08:32

## 2020-03-30 ASSESSMENT — PULMONARY FUNCTION TESTS
PIF_VALUE: 16
PIF_VALUE: 0
PIF_VALUE: 19
PIF_VALUE: 20
PIF_VALUE: 19
PIF_VALUE: 19
PIF_VALUE: 16
PIF_VALUE: 19
PIF_VALUE: 0
PIF_VALUE: 0
PIF_VALUE: 19
PIF_VALUE: 19
PIF_VALUE: 21
PIF_VALUE: 19
PIF_VALUE: 1
PIF_VALUE: 0
PIF_VALUE: 18
PIF_VALUE: 1
PIF_VALUE: 19
PIF_VALUE: 23
PIF_VALUE: 19
PIF_VALUE: 2
PIF_VALUE: 1
PIF_VALUE: 19
PIF_VALUE: 32
PIF_VALUE: 19
PIF_VALUE: 19
PIF_VALUE: 2
PIF_VALUE: 19
PIF_VALUE: 19
PIF_VALUE: 0
PIF_VALUE: 26
PIF_VALUE: 19
PIF_VALUE: 0
PIF_VALUE: 26
PIF_VALUE: 16
PIF_VALUE: 0
PIF_VALUE: 1
PIF_VALUE: 20
PIF_VALUE: 19
PIF_VALUE: 1
PIF_VALUE: 16
PIF_VALUE: 19
PIF_VALUE: 21
PIF_VALUE: 19
PIF_VALUE: 1
PIF_VALUE: 16
PIF_VALUE: 20
PIF_VALUE: 19
PIF_VALUE: 18
PIF_VALUE: 19
PIF_VALUE: 0
PIF_VALUE: 20
PIF_VALUE: 2
PIF_VALUE: 0
PIF_VALUE: 1
PIF_VALUE: 19
PIF_VALUE: 16
PIF_VALUE: 0
PIF_VALUE: 16
PIF_VALUE: 17
PIF_VALUE: 18
PIF_VALUE: 16
PIF_VALUE: 17
PIF_VALUE: 19
PIF_VALUE: 31
PIF_VALUE: 19
PIF_VALUE: 19
PIF_VALUE: 3

## 2020-03-30 ASSESSMENT — PAIN DESCRIPTION - ORIENTATION
ORIENTATION: LEFT

## 2020-03-30 ASSESSMENT — PAIN DESCRIPTION - LOCATION
LOCATION: BREAST

## 2020-03-30 ASSESSMENT — PAIN DESCRIPTION - PAIN TYPE
TYPE: SURGICAL PAIN

## 2020-03-30 ASSESSMENT — PAIN - FUNCTIONAL ASSESSMENT: PAIN_FUNCTIONAL_ASSESSMENT: 0-10

## 2020-03-30 ASSESSMENT — PAIN DESCRIPTION - DESCRIPTORS
DESCRIPTORS: DISCOMFORT
DESCRIPTORS: BURNING

## 2020-03-30 ASSESSMENT — PAIN DESCRIPTION - FREQUENCY: FREQUENCY: CONTINUOUS

## 2020-03-30 ASSESSMENT — PAIN SCALES - GENERAL
PAINLEVEL_OUTOF10: 2
PAINLEVEL_OUTOF10: 2
PAINLEVEL_OUTOF10: 6
PAINLEVEL_OUTOF10: 0

## 2020-03-30 ASSESSMENT — PAIN SCALES - WONG BAKER: WONGBAKER_NUMERICALRESPONSE: 2

## 2020-03-30 NOTE — ANESTHESIA POSTPROCEDURE EVALUATION
Department of Anesthesiology  Postprocedure Note    Patient: Kane Luevano  MRN: [id number]  YOB: 1950  Date of evaluation: 3/30/2020  Time:  12:46 PM     Procedure Summary     Date:  03/30/20 Room / Location:  [address] [place] [address]    Anesthesia Start:  2172 Anesthesia Stop:  0561    Procedure:  BREAST LESION RE EXCISION LEFT LUMPECTOMY SITE (Left Breast) Diagnosis:  (INFILTRATING DUCTAL CARCINOMA OF LEFT BREAST)    Surgeon:  Sarkis Wing MD Responsible Provider:  CHARI Dong CRNA    Anesthesia Type:  general ASA Status:  3          Anesthesia Type: general    Malia Phase I: Malia Score: 8    Malia Phase II: Malia Score: 10    Last vitals: Reviewed and per EMR flowsheets.        Anesthesia Post Evaluation    Patient location during evaluation: PACU  Patient participation: complete - patient participated  Level of consciousness: awake and alert  Pain score: 1  Airway patency: patent  Nausea & Vomiting: no nausea and no vomiting  Complications: no  Cardiovascular status: hemodynamically stable  Respiratory status: acceptable  Hydration status: euvolemic

## 2020-03-30 NOTE — ANESTHESIA POSTPROCEDURE EVALUATION
Department of Anesthesiology  Postprocedure Note    Patient: Edi Sanches  MRN: [id number]  YOB: 1950  Date of evaluation: 3/30/2020  Time:  12:45 PM     Procedure Summary     Date:  03/20/20 Room / Location:  [address] / [facility]    Anesthesia Start:  1033 Anesthesia Stop:  8229    Procedures:       LEFT BREAST LUMPECTOMY WITH NEEDLE LOC AND SENTINEL NODE BIOPSY (Left Breast)      PORT INSERTION (Right Chest) Diagnosis:  (infiltrating ductal carcinoma of left breast)    Surgeon:  Flavio Lombard, MD Responsible Provider:  CHARI Moody CRNA    Anesthesia Type:  general ASA Status:  3          Anesthesia Type: general    Malia Phase I: Malia Score: 8    Malia Phase II: Malia Score: 9    Last vitals: Reviewed and per EMR flowsheets.        Anesthesia Post Evaluation    Patient location during evaluation: PACU  Patient participation: complete - patient cannot participate  Level of consciousness: awake and alert  Pain score: 2  Airway patency: patent  Nausea & Vomiting: no nausea and no vomiting  Complications: no  Cardiovascular status: hemodynamically stable  Respiratory status: acceptable  Hydration status: euvolemic

## 2020-03-30 NOTE — PROGRESS NOTES
0845: Arrived to PACU from OR. Monitors applied, alarms on. Report obtained from [facility] and Rosaura Antonie CRNA.  1987: Medicated for left breast discomfort. 8261: Repositioned in bed. Warm blankets on. States pain medicine was helping a little, dozes easily. 0940: Transported to [facility].  at bedside.

## 2020-03-30 NOTE — ANESTHESIA PROCEDURE NOTES
Peripheral Block    Patient location during procedure: OR  Start time: 3/30/2020 8:18 AM  End time: 3/30/2020 8:28 AM  Staffing  Anesthesiologist: Ariana Baum MD  Resident/CRNA: CHARI Villalobos CRNA  Performed: resident/CRNA   Preanesthetic Checklist  Completed: patient identified, site marked, surgical consent, pre-op evaluation, timeout performed, IV checked, risks and benefits discussed, monitors and equipment checked, anesthesia consent given, oxygen available and patient being monitored  Peripheral Block  Patient position: supine  Prep: ChloraPrep  Patient monitoring: cardiac monitor, continuous pulse ox, continuous capnometry, frequent blood pressure checks and IV access  Block type: PECS I and PECS II  Laterality: left  Injection technique: single-shot  Procedures: ultrasound guided  Local infiltration: ropivacaine  Infiltration strength: 0.5 %  Dose: 30 mL  Provider prep: mask and sterile gloves  Local infiltration: ropivacaine  Needle  Needle type: combined needle/nerve stimulator   Needle gauge: 20 G  Needle length: 8 cm  Needle localization: ultrasound guidance  Assessment  Injection assessment: negative aspiration for heme, no paresthesia on injection and local visualized surrounding nerve on ultrasound  Paresthesia pain: none  Slow fractionated injection: yes  Hemodynamics: stable  Reason for block: post-op pain management

## 2020-04-01 ENCOUNTER — CLINICAL DOCUMENTATION (OUTPATIENT)
Dept: CASE MANAGEMENT | Age: 70
End: 2020-04-01

## 2020-04-01 NOTE — PROGRESS NOTES
E-mailed patient information and link to the [facility] Patient Virtual Support Network now being offered on Wednesday evenings at 7:00 pm.  Encouraged patient to participate and to call with any questions/concerns. Patient had left breast re-excision on Monday. States she is taking Tylenol PRN for pain. No concerns/questions voiced at this time.

## 2020-04-09 ENCOUNTER — OFFICE VISIT (OUTPATIENT)
Dept: SURGERY | Age: 70
End: 2020-04-09

## 2020-04-09 VITALS — OXYGEN SATURATION: 99 % | DIASTOLIC BLOOD PRESSURE: 64 MMHG | HEART RATE: 56 BPM | SYSTOLIC BLOOD PRESSURE: 153 MMHG

## 2020-04-09 PROCEDURE — 99024 POSTOP FOLLOW-UP VISIT: CPT | Performed by: SURGERY

## 2020-04-14 ENCOUNTER — HOSPITAL ENCOUNTER (OUTPATIENT)
Dept: INFUSION THERAPY | Age: 70
Discharge: HOME | End: 2020-04-14
Payer: MEDICARE

## 2020-04-14 PROCEDURE — 99211 OFF/OP EST MAY X REQ PHY/QHP: CPT

## 2020-04-14 PROCEDURE — 99214 OFFICE O/P EST MOD 30 MIN: CPT

## 2020-04-15 ENCOUNTER — CARE COORDINATION (OUTPATIENT)
Dept: CARE COORDINATION | Age: 70
End: 2020-04-15

## 2020-04-15 NOTE — CARE COORDINATION
You Patient resolved from the Care Transitions episode on 4/15/20. Patient/family has been provided the following resources and education related to COVID-19:                         Signs, symptoms and red flags related to COVID-19            CDC exposure and quarantine guidelines            [facility] exposure contact - [phone]            Contact for their local Department of Health                 Patient currently reports that the following symptoms have improved:  fatigue and no new/worsening symptoms     No further outreach scheduled with this CTN/ACM. Episode of Care resolved. Patient has this CTN/ACM contact information if future needs arise. Tanna Jacques RN  Ambulatory Care Manager  [phone]  [email or web address]

## 2020-04-21 ENCOUNTER — OFFICE VISIT (OUTPATIENT)
Dept: SURGERY | Age: 70
End: 2020-04-21

## 2020-04-21 VITALS — DIASTOLIC BLOOD PRESSURE: 62 MMHG | OXYGEN SATURATION: 99 % | SYSTOLIC BLOOD PRESSURE: 147 MMHG | HEART RATE: 54 BPM

## 2020-04-21 PROCEDURE — 99024 POSTOP FOLLOW-UP VISIT: CPT | Performed by: SURGERY

## 2020-04-21 RX ORDER — SULFAMETHOXAZOLE AND TRIMETHOPRIM 800; 160 MG/1; MG/1
1 TABLET ORAL 2 TIMES DAILY
Qty: 20 TABLET | Refills: 0 | Status: SHIPPED | OUTPATIENT
Start: 2020-04-21 | End: 2020-05-01

## 2020-04-22 RX ORDER — ATENOLOL 100 MG/1
100 TABLET ORAL DAILY
Qty: 90 TABLET | Refills: 3 | OUTPATIENT
Start: 2020-04-22

## 2020-04-28 ENCOUNTER — OFFICE VISIT (OUTPATIENT)
Dept: SURGERY | Age: 70
End: 2020-04-28

## 2020-04-28 VITALS — OXYGEN SATURATION: 99 % | SYSTOLIC BLOOD PRESSURE: 139 MMHG | HEART RATE: 54 BPM | DIASTOLIC BLOOD PRESSURE: 58 MMHG

## 2020-04-28 PROCEDURE — 99024 POSTOP FOLLOW-UP VISIT: CPT | Performed by: SURGERY

## 2020-04-28 NOTE — PROGRESS NOTES
Han Jones is 4 weeks post-op: lumpectomy, sentinel node bx and mediport insertion. She had a slight redness to her axillary incision  Last week and as she is going to start chemo we place her on antibiotics. Presenting for routine follow-up. S:  Doing well. Patient has noted no excessive redness and swelling. O:  Wound healing well. A:  Satisfactory course. P: Subcuticular closure intact. .  Patient to return in 3 months. Patient is to return as needed for redness, swelling, discomfort, or any concern about her  surgery.

## 2020-04-30 ENCOUNTER — HOSPITAL ENCOUNTER (OUTPATIENT)
Dept: INFUSION THERAPY | Age: 70
Discharge: HOME | End: 2020-04-30
Payer: MEDICARE

## 2020-04-30 LAB
ALBUMIN SERPL-MCNC: 4.7 GM/DL (ref 3.4–5)
ALP BLD-CCNC: 71 IU/L (ref 40–128)
ALT SERPL-CCNC: 26 U/L (ref 10–40)
ANION GAP SERPL CALCULATED.3IONS-SCNC: 13 MMOL/L (ref 4–16)
AST SERPL-CCNC: 33 IU/L (ref 15–37)
BASOPHILS ABSOLUTE: 0 K/CU MM
BASOPHILS RELATIVE PERCENT: 0.3 % (ref 0–1)
BILIRUB SERPL-MCNC: 0.8 MG/DL (ref 0–1)
BUN BLDV-MCNC: 17 MG/DL (ref 6–23)
CALCIUM SERPL-MCNC: 10.3 MG/DL (ref 8.3–10.6)
CHLORIDE BLD-SCNC: 97 MMOL/L (ref 99–110)
CO2: 24 MMOL/L (ref 21–32)
CREAT SERPL-MCNC: 1.3 MG/DL (ref 0.6–1.1)
DIFFERENTIAL TYPE: ABNORMAL
EOSINOPHILS ABSOLUTE: 0.7 K/CU MM
EOSINOPHILS RELATIVE PERCENT: 9.9 % (ref 0–3)
GFR AFRICAN AMERICAN: 49 ML/MIN/1.73M2
GFR NON-AFRICAN AMERICAN: 40 ML/MIN/1.73M2
GLUCOSE BLD-MCNC: 102 MG/DL (ref 70–99)
HCT VFR BLD CALC: 42.2 % (ref 37–47)
HEMOGLOBIN: 14.3 GM/DL (ref 12.5–16)
LYMPHOCYTES ABSOLUTE: 0.7 K/CU MM
LYMPHOCYTES RELATIVE PERCENT: 10.9 % (ref 24–44)
MCH RBC QN AUTO: 31 PG (ref 27–31)
MCHC RBC AUTO-ENTMCNC: 33.9 % (ref 32–36)
MCV RBC AUTO: 91.5 FL (ref 78–100)
MONOCYTES ABSOLUTE: 0.9 K/CU MM
MONOCYTES RELATIVE PERCENT: 13.3 % (ref 0–4)
PDW BLD-RTO: 14.6 % (ref 11.7–14.9)
PLATELET # BLD: 235 K/CU MM (ref 140–440)
PMV BLD AUTO: 9.5 FL (ref 7.5–11.1)
POTASSIUM SERPL-SCNC: 5.9 MMOL/L (ref 3.5–5.1)
RBC # BLD: 4.61 M/CU MM (ref 4.2–5.4)
SEGMENTED NEUTROPHILS ABSOLUTE COUNT: 4.3 K/CU MM
SEGMENTED NEUTROPHILS RELATIVE PERCENT: 65.6 % (ref 36–66)
SODIUM BLD-SCNC: 134 MMOL/L (ref 135–145)
TOTAL PROTEIN: 7.1 GM/DL (ref 6.4–8.2)
WBC # BLD: 6.5 K/CU MM (ref 4–10.5)

## 2020-04-30 PROCEDURE — 80053 COMPREHEN METABOLIC PANEL: CPT

## 2020-04-30 PROCEDURE — 85025 COMPLETE CBC W/AUTO DIFF WBC: CPT

## 2020-04-30 PROCEDURE — 36415 COLL VENOUS BLD VENIPUNCTURE: CPT

## 2020-04-30 PROCEDURE — 99213 OFFICE O/P EST LOW 20 MIN: CPT

## 2020-04-30 PROCEDURE — 86300 IMMUNOASSAY TUMOR CA 15-3: CPT

## 2020-05-02 LAB — CA 27.29: 28.6 U/ML (ref 0–40)

## 2020-05-04 ENCOUNTER — HOSPITAL ENCOUNTER (OUTPATIENT)
Dept: INFUSION THERAPY | Age: 70
Discharge: HOME | End: 2020-05-04
Payer: MEDICARE

## 2020-05-04 LAB
ALBUMIN SERPL-MCNC: 3.9 GM/DL (ref 3.4–5)
ALP BLD-CCNC: 59 IU/L (ref 40–129)
ALT SERPL-CCNC: 28 U/L (ref 10–40)
ANION GAP SERPL CALCULATED.3IONS-SCNC: 9 MMOL/L (ref 4–16)
AST SERPL-CCNC: 25 IU/L (ref 15–37)
BILIRUB SERPL-MCNC: 0.6 MG/DL (ref 0–1)
BUN BLDV-MCNC: 33 MG/DL (ref 6–23)
CALCIUM SERPL-MCNC: 9.8 MG/DL (ref 8.3–10.6)
CHLORIDE BLD-SCNC: 102 MMOL/L (ref 99–110)
CO2: 25 MMOL/L (ref 21–32)
CREAT SERPL-MCNC: 1.1 MG/DL (ref 0.6–1.1)
GFR AFRICAN AMERICAN: 59 ML/MIN/1.73M2
GFR NON-AFRICAN AMERICAN: 49 ML/MIN/1.73M2
GLUCOSE BLD-MCNC: 103 MG/DL (ref 70–99)
POTASSIUM SERPL-SCNC: 4.8 MMOL/L (ref 3.5–5.1)
SODIUM BLD-SCNC: 136 MMOL/L (ref 135–145)
TOTAL PROTEIN: 6.3 GM/DL (ref 6.4–8.2)

## 2020-05-04 PROCEDURE — 2580000003 HC RX 258: Performed by: INTERNAL MEDICINE

## 2020-05-04 PROCEDURE — 96375 TX/PRO/DX INJ NEW DRUG ADDON: CPT

## 2020-05-04 PROCEDURE — 80053 COMPREHEN METABOLIC PANEL: CPT

## 2020-05-04 PROCEDURE — 96411 CHEMO IV PUSH ADDL DRUG: CPT

## 2020-05-04 PROCEDURE — 96413 CHEMO IV INFUSION 1 HR: CPT

## 2020-05-04 PROCEDURE — 6360000002 HC RX W HCPCS

## 2020-05-04 PROCEDURE — 6360000002 HC RX W HCPCS: Performed by: INTERNAL MEDICINE

## 2020-05-04 PROCEDURE — 96377 APPLICATON ON-BODY INJECTOR: CPT

## 2020-05-04 PROCEDURE — 96367 TX/PROPH/DG ADDL SEQ IV INF: CPT

## 2020-05-04 PROCEDURE — 36591 DRAW BLOOD OFF VENOUS DEVICE: CPT

## 2020-05-04 RX ORDER — PALONOSETRON 0.05 MG/ML
INJECTION, SOLUTION INTRAVENOUS
Status: DISPENSED
Start: 2020-05-04 | End: 2020-05-04

## 2020-05-04 RX ORDER — DOXORUBICIN HYDROCHLORIDE 2 MG/ML
110 INJECTION, SOLUTION INTRAVENOUS ONCE
Status: DISCONTINUED | OUTPATIENT
Start: 2020-05-04 | End: 2020-05-05 | Stop reason: HOSPADM

## 2020-05-04 RX ORDER — HEPARIN SODIUM (PORCINE) LOCK FLUSH IV SOLN 100 UNIT/ML 100 UNIT/ML
SOLUTION INTRAVENOUS
Status: DISPENSED
Start: 2020-05-04 | End: 2020-05-04

## 2020-05-04 RX ORDER — PALONOSETRON 0.05 MG/ML
0.25 INJECTION, SOLUTION INTRAVENOUS ONCE
Status: DISCONTINUED | OUTPATIENT
Start: 2020-05-04 | End: 2020-05-05 | Stop reason: HOSPADM

## 2020-05-18 ENCOUNTER — HOSPITAL ENCOUNTER (OUTPATIENT)
Dept: INFUSION THERAPY | Age: 70
Discharge: HOME | End: 2020-05-18
Payer: MEDICARE

## 2020-05-18 LAB
ALBUMIN SERPL-MCNC: 3.8 GM/DL (ref 3.4–5)
ALP BLD-CCNC: 90 IU/L (ref 40–128)
ALT SERPL-CCNC: 21 U/L (ref 10–40)
ANION GAP SERPL CALCULATED.3IONS-SCNC: 11 MMOL/L (ref 4–16)
AST SERPL-CCNC: 22 IU/L (ref 15–37)
BASOPHILS ABSOLUTE: 0.2 K/CU MM
BASOPHILS RELATIVE PERCENT: 1 % (ref 0–1)
BILIRUB SERPL-MCNC: 0.3 MG/DL (ref 0–1)
BUN BLDV-MCNC: 16 MG/DL (ref 6–23)
CALCIUM SERPL-MCNC: 9.5 MG/DL (ref 8.3–10.6)
CHLORIDE BLD-SCNC: 105 MMOL/L (ref 99–110)
CO2: 26 MMOL/L (ref 21–32)
CREAT SERPL-MCNC: 0.8 MG/DL (ref 0.6–1.1)
DIFFERENTIAL TYPE: ABNORMAL
EOSINOPHILS ABSOLUTE: 0.1 K/CU MM
EOSINOPHILS RELATIVE PERCENT: 0.8 % (ref 0–3)
GFR AFRICAN AMERICAN: >60 ML/MIN/1.73M2
GFR NON-AFRICAN AMERICAN: >60 ML/MIN/1.73M2
GLUCOSE BLD-MCNC: 88 MG/DL (ref 70–99)
HCT VFR BLD CALC: 32.1 % (ref 37–47)
HEMOGLOBIN: 10.6 GM/DL (ref 12.5–16)
LYMPHOCYTES ABSOLUTE: 1.9 K/CU MM
LYMPHOCYTES RELATIVE PERCENT: 12.9 % (ref 24–44)
MCH RBC QN AUTO: 30.5 PG (ref 27–31)
MCHC RBC AUTO-ENTMCNC: 33 % (ref 32–36)
MCV RBC AUTO: 92.5 FL (ref 78–100)
MONOCYTES ABSOLUTE: 1.9 K/CU MM
MONOCYTES RELATIVE PERCENT: 12.9 % (ref 0–4)
PDW BLD-RTO: 14.2 % (ref 11.7–14.9)
PLATELET # BLD: 254 K/CU MM (ref 140–440)
PMV BLD AUTO: 9.5 FL (ref 7.5–11.1)
POTASSIUM SERPL-SCNC: 4.3 MMOL/L (ref 3.5–5.1)
RBC # BLD: 3.47 M/CU MM (ref 4.2–5.4)
SEGMENTED NEUTROPHILS ABSOLUTE COUNT: 10.7 K/CU MM
SEGMENTED NEUTROPHILS RELATIVE PERCENT: 72.4 % (ref 36–66)
SODIUM BLD-SCNC: 142 MMOL/L (ref 135–145)
TOTAL PROTEIN: 5.9 GM/DL (ref 6.4–8.2)
WBC # BLD: 14.8 K/CU MM (ref 4–10.5)

## 2020-05-18 PROCEDURE — 6360000002 HC RX W HCPCS

## 2020-05-18 PROCEDURE — 96377 APPLICATON ON-BODY INJECTOR: CPT

## 2020-05-18 PROCEDURE — 96367 TX/PROPH/DG ADDL SEQ IV INF: CPT

## 2020-05-18 PROCEDURE — 36591 DRAW BLOOD OFF VENOUS DEVICE: CPT

## 2020-05-18 PROCEDURE — 96413 CHEMO IV INFUSION 1 HR: CPT

## 2020-05-18 PROCEDURE — 2580000003 HC RX 258: Performed by: INTERNAL MEDICINE

## 2020-05-18 PROCEDURE — 96411 CHEMO IV PUSH ADDL DRUG: CPT

## 2020-05-18 PROCEDURE — 6360000002 HC RX W HCPCS: Performed by: INTERNAL MEDICINE

## 2020-05-18 PROCEDURE — 80053 COMPREHEN METABOLIC PANEL: CPT

## 2020-05-18 PROCEDURE — 85025 COMPLETE CBC W/AUTO DIFF WBC: CPT

## 2020-05-18 PROCEDURE — 96375 TX/PRO/DX INJ NEW DRUG ADDON: CPT

## 2020-05-18 RX ORDER — DOXORUBICIN HYDROCHLORIDE 2 MG/ML
110 INJECTION, SOLUTION INTRAVENOUS ONCE
Status: DISCONTINUED | OUTPATIENT
Start: 2020-05-18 | End: 2020-05-19 | Stop reason: HOSPADM

## 2020-05-18 RX ORDER — HEPARIN SODIUM (PORCINE) LOCK FLUSH IV SOLN 100 UNIT/ML 100 UNIT/ML
SOLUTION INTRAVENOUS
Status: DISPENSED
Start: 2020-05-18 | End: 2020-05-18

## 2020-05-18 RX ORDER — PALONOSETRON 0.05 MG/ML
INJECTION, SOLUTION INTRAVENOUS
Status: DISPENSED
Start: 2020-05-18 | End: 2020-05-18

## 2020-05-18 RX ORDER — PALONOSETRON 0.05 MG/ML
0.25 INJECTION, SOLUTION INTRAVENOUS ONCE
Status: DISCONTINUED | OUTPATIENT
Start: 2020-05-18 | End: 2020-05-19 | Stop reason: HOSPADM

## 2020-05-29 ENCOUNTER — HOSPITAL ENCOUNTER (OUTPATIENT)
Dept: INFUSION THERAPY | Age: 70
Discharge: HOME | End: 2020-05-29
Payer: MEDICARE

## 2020-05-29 LAB
ALBUMIN SERPL-MCNC: 3.6 GM/DL (ref 3.4–5)
ALP BLD-CCNC: 113 IU/L (ref 40–129)
ALT SERPL-CCNC: 23 U/L (ref 10–40)
ANION GAP SERPL CALCULATED.3IONS-SCNC: 13 MMOL/L (ref 4–16)
AST SERPL-CCNC: 21 IU/L (ref 15–37)
BASOPHILS ABSOLUTE: 0.2 K/CU MM
BASOPHILS RELATIVE PERCENT: 1 % (ref 0–1)
BILIRUB SERPL-MCNC: 0.3 MG/DL (ref 0–1)
BUN BLDV-MCNC: 10 MG/DL (ref 6–23)
CALCIUM SERPL-MCNC: 8.9 MG/DL (ref 8.3–10.6)
CHLORIDE BLD-SCNC: 103 MMOL/L (ref 99–110)
CO2: 26 MMOL/L (ref 21–32)
CREAT SERPL-MCNC: 0.8 MG/DL (ref 0.6–1.1)
DIFFERENTIAL TYPE: ABNORMAL
EOSINOPHILS ABSOLUTE: 0 K/CU MM
EOSINOPHILS RELATIVE PERCENT: 0.2 % (ref 0–3)
GFR AFRICAN AMERICAN: >60 ML/MIN/1.73M2
GFR NON-AFRICAN AMERICAN: >60 ML/MIN/1.73M2
GLUCOSE BLD-MCNC: 89 MG/DL (ref 70–99)
HCT VFR BLD CALC: 29.9 % (ref 37–47)
HEMOGLOBIN: 9.9 GM/DL (ref 12.5–16)
LYMPHOCYTES ABSOLUTE: 1.4 K/CU MM
LYMPHOCYTES RELATIVE PERCENT: 8 % (ref 24–44)
MCH RBC QN AUTO: 30.7 PG (ref 27–31)
MCHC RBC AUTO-ENTMCNC: 33.1 % (ref 32–36)
MCV RBC AUTO: 92.9 FL (ref 78–100)
MONOCYTES ABSOLUTE: 2.9 K/CU MM
MONOCYTES RELATIVE PERCENT: 16.8 % (ref 0–4)
PDW BLD-RTO: 14.6 % (ref 11.7–14.9)
PLATELET # BLD: 186 K/CU MM (ref 140–440)
PMV BLD AUTO: 10.1 FL (ref 7.5–11.1)
POTASSIUM SERPL-SCNC: 4.1 MMOL/L (ref 3.5–5.1)
RBC # BLD: 3.22 M/CU MM (ref 4.2–5.4)
SEGMENTED NEUTROPHILS ABSOLUTE COUNT: 12.7 K/CU MM
SEGMENTED NEUTROPHILS RELATIVE PERCENT: 74 % (ref 36–66)
SODIUM BLD-SCNC: 142 MMOL/L (ref 135–145)
TOTAL PROTEIN: 5.8 GM/DL (ref 6.4–8.2)
WBC # BLD: 17.2 K/CU MM (ref 4–10.5)

## 2020-05-29 PROCEDURE — 36415 COLL VENOUS BLD VENIPUNCTURE: CPT

## 2020-05-29 PROCEDURE — 85025 COMPLETE CBC W/AUTO DIFF WBC: CPT

## 2020-05-29 PROCEDURE — 99211 OFF/OP EST MAY X REQ PHY/QHP: CPT

## 2020-05-29 PROCEDURE — 36591 DRAW BLOOD OFF VENOUS DEVICE: CPT

## 2020-05-29 PROCEDURE — 80053 COMPREHEN METABOLIC PANEL: CPT

## 2020-05-29 RX ORDER — HEPARIN SODIUM (PORCINE) LOCK FLUSH IV SOLN 100 UNIT/ML 100 UNIT/ML
500 SOLUTION INTRAVENOUS PRN
Status: CANCELLED | OUTPATIENT
Start: 2020-06-01

## 2020-05-29 RX ORDER — PALONOSETRON 0.05 MG/ML
0.25 INJECTION, SOLUTION INTRAVENOUS ONCE
Status: CANCELLED | OUTPATIENT
Start: 2020-06-01

## 2020-05-29 RX ORDER — DOXORUBICIN HYDROCHLORIDE 2 MG/ML
110 INJECTION, SOLUTION INTRAVENOUS ONCE
Status: CANCELLED | OUTPATIENT
Start: 2020-06-01

## 2020-06-01 ENCOUNTER — HOSPITAL ENCOUNTER (OUTPATIENT)
Dept: INFUSION THERAPY | Age: 70
Discharge: HOME | End: 2020-06-01
Payer: MEDICARE

## 2020-06-01 PROCEDURE — 2580000003 HC RX 258: Performed by: INTERNAL MEDICINE

## 2020-06-01 PROCEDURE — 96413 CHEMO IV INFUSION 1 HR: CPT

## 2020-06-01 PROCEDURE — 96377 APPLICATON ON-BODY INJECTOR: CPT

## 2020-06-01 PROCEDURE — 6360000002 HC RX W HCPCS: Performed by: INTERNAL MEDICINE

## 2020-06-01 PROCEDURE — 96375 TX/PRO/DX INJ NEW DRUG ADDON: CPT

## 2020-06-01 PROCEDURE — 96367 TX/PROPH/DG ADDL SEQ IV INF: CPT

## 2020-06-01 PROCEDURE — 6360000002 HC RX W HCPCS

## 2020-06-01 PROCEDURE — 96411 CHEMO IV PUSH ADDL DRUG: CPT

## 2020-06-01 RX ORDER — DOXORUBICIN HYDROCHLORIDE 2 MG/ML
110 INJECTION, SOLUTION INTRAVENOUS ONCE
Status: DISCONTINUED | OUTPATIENT
Start: 2020-06-01 | End: 2020-06-02 | Stop reason: HOSPADM

## 2020-06-01 RX ORDER — PALONOSETRON 0.05 MG/ML
0.25 INJECTION, SOLUTION INTRAVENOUS ONCE
Status: DISCONTINUED | OUTPATIENT
Start: 2020-06-01 | End: 2020-06-02 | Stop reason: HOSPADM

## 2020-06-01 RX ORDER — HEPARIN SODIUM (PORCINE) LOCK FLUSH IV SOLN 100 UNIT/ML 100 UNIT/ML
SOLUTION INTRAVENOUS
Status: DISCONTINUED
Start: 2020-06-01 | End: 2020-06-02 | Stop reason: HOSPADM

## 2020-06-01 RX ORDER — PALONOSETRON 0.05 MG/ML
INJECTION, SOLUTION INTRAVENOUS
Status: DISPENSED
Start: 2020-06-01 | End: 2020-06-01

## 2020-06-09 ENCOUNTER — APPOINTMENT (OUTPATIENT)
Dept: GENERAL RADIOLOGY | Age: 70
DRG: 809 | End: 2020-06-09
Payer: MEDICARE

## 2020-06-09 ENCOUNTER — HOSPITAL ENCOUNTER (INPATIENT)
Age: 70
LOS: 3 days | Discharge: HOME | DRG: 809 | End: 2020-06-13
Attending: EMERGENCY MEDICINE | Admitting: INTERNAL MEDICINE
Payer: MEDICARE

## 2020-06-09 LAB
ALBUMIN SERPL-MCNC: 3.3 GM/DL (ref 3.4–5)
ALP BLD-CCNC: 81 IU/L (ref 40–129)
ALT SERPL-CCNC: 13 U/L (ref 10–40)
ANION GAP SERPL CALCULATED.3IONS-SCNC: 13 MMOL/L (ref 4–16)
AST SERPL-CCNC: 12 IU/L (ref 15–37)
BANDED NEUTROPHILS ABSOLUTE COUNT: 0.06 K/CU MM
BANDED NEUTROPHILS RELATIVE PERCENT: 12 % (ref 5–11)
BILIRUB SERPL-MCNC: 0.8 MG/DL (ref 0–1)
BUN BLDV-MCNC: 23 MG/DL (ref 6–23)
CALCIUM SERPL-MCNC: 8.5 MG/DL (ref 8.3–10.6)
CELLS COUNTED: 50
CHLORIDE BLD-SCNC: 95 MMOL/L (ref 99–110)
CO2: 22 MMOL/L (ref 21–32)
CREAT SERPL-MCNC: 1.2 MG/DL (ref 0.6–1.1)
DIFFERENTIAL TYPE: ABNORMAL
EOSINOPHILS ABSOLUTE: 0 K/CU MM
EOSINOPHILS RELATIVE PERCENT: 2 % (ref 0–3)
GFR AFRICAN AMERICAN: 54 ML/MIN/1.73M2
GFR NON-AFRICAN AMERICAN: 44 ML/MIN/1.73M2
GLUCOSE BLD-MCNC: 114 MG/DL (ref 70–99)
HCT VFR BLD CALC: 23.7 % (ref 37–47)
HEMOGLOBIN: 7.9 GM/DL (ref 12.5–16)
LACTATE: 1.9 MMOL/L (ref 0.4–2)
LIPASE: 25 IU/L (ref 13–60)
LYMPHOCYTES ABSOLUTE: 0.2 K/CU MM
LYMPHOCYTES RELATIVE PERCENT: 46 % (ref 24–44)
MCH RBC QN AUTO: 31.3 PG (ref 27–31)
MCHC RBC AUTO-ENTMCNC: 33.3 % (ref 32–36)
MCV RBC AUTO: 94 FL (ref 78–100)
MONOCYTES ABSOLUTE: 0.1 K/CU MM
MONOCYTES RELATIVE PERCENT: 16 % (ref 0–4)
PDW BLD-RTO: 14.6 % (ref 11.7–14.9)
PLATELET # BLD: 154 K/CU MM (ref 140–440)
POTASSIUM SERPL-SCNC: 3.5 MMOL/L (ref 3.5–5.1)
RBC # BLD: 2.52 M/CU MM (ref 4.2–5.4)
SEGMENTED NEUTROPHILS ABSOLUTE COUNT: 0.1 K/CU MM
SEGMENTED NEUTROPHILS RELATIVE PERCENT: 24 % (ref 36–66)
SODIUM BLD-SCNC: 130 MMOL/L (ref 135–145)
TOTAL PROTEIN: 6.2 GM/DL (ref 6.4–8.2)
TROPONIN T: <0.01 NG/ML
WBC # BLD: 0.5 K/CU MM (ref 4–10.5)

## 2020-06-09 PROCEDURE — 6360000002 HC RX W HCPCS: Performed by: EMERGENCY MEDICINE

## 2020-06-09 PROCEDURE — 83605 ASSAY OF LACTIC ACID: CPT

## 2020-06-09 PROCEDURE — 6370000000 HC RX 637 (ALT 250 FOR IP): Performed by: PHYSICIAN ASSISTANT

## 2020-06-09 PROCEDURE — 2580000003 HC RX 258: Performed by: PHYSICIAN ASSISTANT

## 2020-06-09 PROCEDURE — 83880 ASSAY OF NATRIURETIC PEPTIDE: CPT

## 2020-06-09 PROCEDURE — 96375 TX/PRO/DX INJ NEW DRUG ADDON: CPT

## 2020-06-09 PROCEDURE — 85027 COMPLETE CBC AUTOMATED: CPT

## 2020-06-09 PROCEDURE — 2580000003 HC RX 258: Performed by: EMERGENCY MEDICINE

## 2020-06-09 PROCEDURE — 96365 THER/PROPH/DIAG IV INF INIT: CPT

## 2020-06-09 PROCEDURE — 87040 BLOOD CULTURE FOR BACTERIA: CPT

## 2020-06-09 PROCEDURE — 99285 EMERGENCY DEPT VISIT HI MDM: CPT

## 2020-06-09 PROCEDURE — 84484 ASSAY OF TROPONIN QUANT: CPT

## 2020-06-09 PROCEDURE — 80053 COMPREHEN METABOLIC PANEL: CPT

## 2020-06-09 PROCEDURE — 6360000002 HC RX W HCPCS: Performed by: PHYSICIAN ASSISTANT

## 2020-06-09 PROCEDURE — 85007 BL SMEAR W/DIFF WBC COUNT: CPT

## 2020-06-09 PROCEDURE — 71045 X-RAY EXAM CHEST 1 VIEW: CPT

## 2020-06-09 PROCEDURE — 83690 ASSAY OF LIPASE: CPT

## 2020-06-09 PROCEDURE — 81001 URINALYSIS AUTO W/SCOPE: CPT

## 2020-06-09 PROCEDURE — 96361 HYDRATE IV INFUSION ADD-ON: CPT

## 2020-06-09 RX ORDER — IBUPROFEN 600 MG/1
600 TABLET ORAL ONCE
Status: DISCONTINUED | OUTPATIENT
Start: 2020-06-09 | End: 2020-06-09

## 2020-06-09 RX ORDER — ACETAMINOPHEN 500 MG
1000 TABLET ORAL ONCE
Status: DISCONTINUED | OUTPATIENT
Start: 2020-06-09 | End: 2020-06-09

## 2020-06-09 RX ORDER — 0.9 % SODIUM CHLORIDE 0.9 %
1000 INTRAVENOUS SOLUTION INTRAVENOUS ONCE
Status: COMPLETED | OUTPATIENT
Start: 2020-06-09 | End: 2020-06-09

## 2020-06-09 RX ORDER — ACETAMINOPHEN 160 MG/5ML
1000 SOLUTION ORAL ONCE
Status: COMPLETED | OUTPATIENT
Start: 2020-06-09 | End: 2020-06-09

## 2020-06-09 RX ORDER — ONDANSETRON 2 MG/ML
4 INJECTION INTRAMUSCULAR; INTRAVENOUS ONCE
Status: COMPLETED | OUTPATIENT
Start: 2020-06-09 | End: 2020-06-09

## 2020-06-09 RX ORDER — SODIUM CHLORIDE 9 MG/ML
INJECTION, SOLUTION INTRAVENOUS CONTINUOUS
Status: DISCONTINUED | OUTPATIENT
Start: 2020-06-09 | End: 2020-06-12

## 2020-06-09 RX ADMIN — CEFEPIME HYDROCHLORIDE 2 G: 2 INJECTION, POWDER, FOR SOLUTION INTRAVENOUS at 23:58

## 2020-06-09 RX ADMIN — ONDANSETRON 4 MG: 2 INJECTION INTRAMUSCULAR; INTRAVENOUS at 22:45

## 2020-06-09 RX ADMIN — SODIUM CHLORIDE 1000 ML: 9 INJECTION, SOLUTION INTRAVENOUS at 21:50

## 2020-06-09 RX ADMIN — ACETAMINOPHEN 1000 MG: 160 SOLUTION ORAL at 22:45

## 2020-06-09 RX ADMIN — IBUPROFEN 600 MG: 100 SUSPENSION ORAL at 22:45

## 2020-06-09 ASSESSMENT — PAIN SCALES - GENERAL: PAINLEVEL_OUTOF10: 7

## 2020-06-10 PROBLEM — D70.9 NEUTROPENIC FEVER (HCC): Status: ACTIVE | Noted: 2020-06-10

## 2020-06-10 PROBLEM — R50.81 NEUTROPENIC FEVER (HCC): Status: ACTIVE | Noted: 2020-06-10

## 2020-06-10 LAB
ANION GAP SERPL CALCULATED.3IONS-SCNC: 10 MMOL/L (ref 4–16)
ANISOCYTOSIS: ABNORMAL
BANDED NEUTROPHILS ABSOLUTE COUNT: 0.08 K/CU MM
BANDED NEUTROPHILS RELATIVE PERCENT: 14 % (ref 5–11)
BASOPHILS ABSOLUTE: 0 K/CU MM
BASOPHILS RELATIVE PERCENT: 1 % (ref 0–1)
BUN BLDV-MCNC: 24 MG/DL (ref 6–23)
CALCIUM SERPL-MCNC: 7.9 MG/DL (ref 8.3–10.6)
CHLORIDE BLD-SCNC: 104 MMOL/L (ref 99–110)
CO2: 22 MMOL/L (ref 21–32)
CREAT SERPL-MCNC: 1.2 MG/DL (ref 0.6–1.1)
DIFFERENTIAL TYPE: ABNORMAL
EOSINOPHILS ABSOLUTE: 0 K/CU MM
EOSINOPHILS RELATIVE PERCENT: 1 % (ref 0–3)
GFR AFRICAN AMERICAN: 54 ML/MIN/1.73M2
GFR NON-AFRICAN AMERICAN: 44 ML/MIN/1.73M2
GLUCOSE BLD-MCNC: 103 MG/DL (ref 70–99)
HCT VFR BLD CALC: 23.9 % (ref 37–47)
HEMOGLOBIN: 7.7 GM/DL (ref 12.5–16)
HIGH SENSITIVE C-REACTIVE PROTEIN: >300 MG/L
LACTATE: 1.6 MMOL/L (ref 0.4–2)
LYMPHOCYTES ABSOLUTE: 0.2 K/CU MM
LYMPHOCYTES RELATIVE PERCENT: 32 % (ref 24–44)
MAGNESIUM: 1.9 MG/DL (ref 1.8–2.4)
MCH RBC QN AUTO: 31 PG (ref 27–31)
MCHC RBC AUTO-ENTMCNC: 32.2 % (ref 32–36)
MCV RBC AUTO: 96.4 FL (ref 78–100)
METAMYELOCYTES ABSOLUTE COUNT: 0.01 K/CU MM
METAMYELOCYTES PERCENT: 2 %
MONOCYTES ABSOLUTE: 0.2 K/CU MM
MONOCYTES RELATIVE PERCENT: 25 % (ref 0–4)
PDW BLD-RTO: 14.8 % (ref 11.7–14.9)
PLATELET # BLD: 169 K/CU MM (ref 140–440)
PLT MORPHOLOGY: ABNORMAL
PMV BLD AUTO: 11.3 FL (ref 7.5–11.1)
POTASSIUM SERPL-SCNC: 3 MMOL/L (ref 3.5–5.1)
PRO-BNP: 495.1 PG/ML
PROCALCITONIN: 1.96
RBC # BLD: 2.48 M/CU MM (ref 4.2–5.4)
SEGMENTED NEUTROPHILS ABSOLUTE COUNT: 0.1 K/CU MM
SEGMENTED NEUTROPHILS RELATIVE PERCENT: 25 % (ref 36–66)
SODIUM BLD-SCNC: 136 MMOL/L (ref 135–145)
WBC # BLD: 0.6 K/CU MM (ref 4–10.5)
WBC # BLD: ABNORMAL 10*3/UL

## 2020-06-10 PROCEDURE — 99223 1ST HOSP IP/OBS HIGH 75: CPT | Performed by: INTERNAL MEDICINE

## 2020-06-10 PROCEDURE — 96367 TX/PROPH/DG ADDL SEQ IV INF: CPT

## 2020-06-10 PROCEDURE — 2580000003 HC RX 258: Performed by: PHYSICIAN ASSISTANT

## 2020-06-10 PROCEDURE — 6370000000 HC RX 637 (ALT 250 FOR IP): Performed by: INTERNAL MEDICINE

## 2020-06-10 PROCEDURE — APPSS60 APP SPLIT SHARED TIME 46-60 MINUTES: Performed by: NURSE PRACTITIONER

## 2020-06-10 PROCEDURE — 2060000000 HC ICU INTERMEDIATE R&B

## 2020-06-10 PROCEDURE — 94761 N-INVAS EAR/PLS OXIMETRY MLT: CPT

## 2020-06-10 PROCEDURE — 1200000000 HC SEMI PRIVATE

## 2020-06-10 PROCEDURE — 6360000002 HC RX W HCPCS: Performed by: EMERGENCY MEDICINE

## 2020-06-10 PROCEDURE — 96366 THER/PROPH/DIAG IV INF ADDON: CPT

## 2020-06-10 PROCEDURE — 2580000003 HC RX 258: Performed by: INTERNAL MEDICINE

## 2020-06-10 PROCEDURE — U0002 COVID-19 LAB TEST NON-CDC: HCPCS

## 2020-06-10 PROCEDURE — 83735 ASSAY OF MAGNESIUM: CPT

## 2020-06-10 PROCEDURE — 96372 THER/PROPH/DIAG INJ SC/IM: CPT

## 2020-06-10 PROCEDURE — 80048 BASIC METABOLIC PNL TOTAL CA: CPT

## 2020-06-10 PROCEDURE — G0378 HOSPITAL OBSERVATION PER HR: HCPCS

## 2020-06-10 PROCEDURE — 86141 C-REACTIVE PROTEIN HS: CPT

## 2020-06-10 PROCEDURE — 85007 BL SMEAR W/DIFF WBC COUNT: CPT

## 2020-06-10 PROCEDURE — 6360000002 HC RX W HCPCS: Performed by: INTERNAL MEDICINE

## 2020-06-10 PROCEDURE — 85027 COMPLETE CBC AUTOMATED: CPT

## 2020-06-10 PROCEDURE — 84145 PROCALCITONIN (PCT): CPT

## 2020-06-10 PROCEDURE — 2580000003 HC RX 258: Performed by: EMERGENCY MEDICINE

## 2020-06-10 PROCEDURE — 83605 ASSAY OF LACTIC ACID: CPT

## 2020-06-10 RX ORDER — SODIUM CHLORIDE 0.9 % (FLUSH) 0.9 %
10 SYRINGE (ML) INJECTION EVERY 12 HOURS SCHEDULED
Status: DISCONTINUED | OUTPATIENT
Start: 2020-06-10 | End: 2020-06-13 | Stop reason: HOSPADM

## 2020-06-10 RX ORDER — ATORVASTATIN CALCIUM 10 MG/1
10 TABLET, FILM COATED ORAL DAILY
Status: DISCONTINUED | OUTPATIENT
Start: 2020-06-10 | End: 2020-06-13 | Stop reason: HOSPADM

## 2020-06-10 RX ORDER — ACETAMINOPHEN 325 MG/1
650 TABLET ORAL EVERY 6 HOURS PRN
Status: DISCONTINUED | OUTPATIENT
Start: 2020-06-10 | End: 2020-06-13 | Stop reason: HOSPADM

## 2020-06-10 RX ORDER — ASPIRIN 81 MG/1
81 TABLET, CHEWABLE ORAL DAILY
Status: DISCONTINUED | OUTPATIENT
Start: 2020-06-10 | End: 2020-06-13 | Stop reason: HOSPADM

## 2020-06-10 RX ORDER — POLYETHYLENE GLYCOL 3350 17 G/17G
17 POWDER, FOR SOLUTION ORAL DAILY PRN
Status: DISCONTINUED | OUTPATIENT
Start: 2020-06-10 | End: 2020-06-13 | Stop reason: HOSPADM

## 2020-06-10 RX ORDER — POTASSIUM CHLORIDE 20 MEQ/1
40 TABLET, EXTENDED RELEASE ORAL PRN
Status: DISCONTINUED | OUTPATIENT
Start: 2020-06-10 | End: 2020-06-13 | Stop reason: HOSPADM

## 2020-06-10 RX ORDER — POTASSIUM CHLORIDE 7.45 MG/ML
10 INJECTION INTRAVENOUS PRN
Status: DISCONTINUED | OUTPATIENT
Start: 2020-06-10 | End: 2020-06-13 | Stop reason: HOSPADM

## 2020-06-10 RX ORDER — ACETAMINOPHEN 650 MG/1
650 SUPPOSITORY RECTAL EVERY 6 HOURS PRN
Status: DISCONTINUED | OUTPATIENT
Start: 2020-06-10 | End: 2020-06-13 | Stop reason: HOSPADM

## 2020-06-10 RX ORDER — SODIUM CHLORIDE 0.9 % (FLUSH) 0.9 %
10 SYRINGE (ML) INJECTION PRN
Status: DISCONTINUED | OUTPATIENT
Start: 2020-06-10 | End: 2020-06-13 | Stop reason: HOSPADM

## 2020-06-10 RX ORDER — ONDANSETRON 2 MG/ML
4 INJECTION INTRAMUSCULAR; INTRAVENOUS EVERY 6 HOURS PRN
Status: DISCONTINUED | OUTPATIENT
Start: 2020-06-10 | End: 2020-06-13 | Stop reason: HOSPADM

## 2020-06-10 RX ORDER — PROMETHAZINE HYDROCHLORIDE 25 MG/1
12.5 TABLET ORAL EVERY 6 HOURS PRN
Status: DISCONTINUED | OUTPATIENT
Start: 2020-06-10 | End: 2020-06-13 | Stop reason: HOSPADM

## 2020-06-10 RX ADMIN — TBO-FILGRASTIM 300 MCG: 300 INJECTION, SOLUTION SUBCUTANEOUS at 05:37

## 2020-06-10 RX ADMIN — MEROPENEM 1 G: 1 INJECTION, POWDER, FOR SOLUTION INTRAVENOUS at 14:24

## 2020-06-10 RX ADMIN — POTASSIUM CHLORIDE 10 MEQ: 7.46 INJECTION, SOLUTION INTRAVENOUS at 09:10

## 2020-06-10 RX ADMIN — ASPIRIN 81 MG 81 MG: 81 TABLET ORAL at 08:11

## 2020-06-10 RX ADMIN — VANCOMYCIN HYDROCHLORIDE 1500 MG: 5 INJECTION, POWDER, LYOPHILIZED, FOR SOLUTION INTRAVENOUS at 00:45

## 2020-06-10 RX ADMIN — SODIUM CHLORIDE: 9 INJECTION, SOLUTION INTRAVENOUS at 00:45

## 2020-06-10 RX ADMIN — POTASSIUM CHLORIDE 10 MEQ: 7.46 INJECTION, SOLUTION INTRAVENOUS at 11:34

## 2020-06-10 RX ADMIN — ATORVASTATIN CALCIUM 10 MG: 10 TABLET, FILM COATED ORAL at 08:11

## 2020-06-10 RX ADMIN — SODIUM CHLORIDE: 9 INJECTION, SOLUTION INTRAVENOUS at 09:10

## 2020-06-10 RX ADMIN — SODIUM CHLORIDE, PRESERVATIVE FREE 10 ML: 5 INJECTION INTRAVENOUS at 08:12

## 2020-06-10 RX ADMIN — SODIUM CHLORIDE: 9 INJECTION, SOLUTION INTRAVENOUS at 17:30

## 2020-06-10 RX ADMIN — POTASSIUM CHLORIDE 10 MEQ: 7.46 INJECTION, SOLUTION INTRAVENOUS at 15:53

## 2020-06-10 RX ADMIN — MEROPENEM 1 G: 1 INJECTION, POWDER, FOR SOLUTION INTRAVENOUS at 06:45

## 2020-06-10 RX ADMIN — ENOXAPARIN SODIUM 40 MG: 40 INJECTION SUBCUTANEOUS at 08:11

## 2020-06-10 RX ADMIN — POTASSIUM CHLORIDE 10 MEQ: 7.46 INJECTION, SOLUTION INTRAVENOUS at 17:53

## 2020-06-10 RX ADMIN — POTASSIUM CHLORIDE 10 MEQ: 7.46 INJECTION, SOLUTION INTRAVENOUS at 13:44

## 2020-06-10 RX ADMIN — POTASSIUM CHLORIDE 10 MEQ: 7.46 INJECTION, SOLUTION INTRAVENOUS at 08:11

## 2020-06-10 RX ADMIN — MEROPENEM 1 G: 1 INJECTION, POWDER, FOR SOLUTION INTRAVENOUS at 22:00

## 2020-06-10 ASSESSMENT — PAIN SCALES - GENERAL
PAINLEVEL_OUTOF10: 0
PAINLEVEL_OUTOF10: 0

## 2020-06-10 NOTE — H&P
History and Physical      Name:  Viry Ho /Age/Sex: 1950  (78 y.o. female)   MRN & CSN:  [id number] & [id number] Admission Date/Time: 2020  9:27 PM   Location:  ED05/ED-05 PCP: Charisma Good MD       Hospital Day: 2    Assessment and Plan:   Viry Ho is a 78 y.o.  female  who presents with fever    -Neutropenic fever  -Severe neutropenia due to chemotherapy  -Left breast cancer status post lumpectomy. Currently on chemotherapy.  -Anemia due to chemotherapy  -Hyponatremia serum sodium 130 mEq/L probably due to hypovolemia    Plan  IV fluids  Empiric antibiotic coverage with vancomycin and meropenem  Consult infectious disease  Consult heme-onc  Follow blood cultures  Urinalysis with reflex urine culture  Neupogen  Follow-up COVID 19    Diet No diet orders on file   DVT Prophylaxis [] Lovenox, []  Heparin, [] SCDs, [] Ambulation   GI Prophylaxis [] PPI,  [] H2 Blocker,  [] Carafate,  [] Diet/Tube Feeds   Code Status No Order   Disposition Patient requires continued admission due to    MDM [] Low, [] Moderate,[]  High  Patient's risk as above due to      History of Present Illness:     Chief Complaint: Fever  Viry Ho is a 78 y.o.  female  who presents with fever. Patient presented to emergency room with fever and chills over the past 2 days. Also she had nausea vomiting. No abdominal pain, change in bowel habits or urinary symptoms. No chest pain, shortness of breath or headache. She had cough productive of clear sputum. Also she reported dysphagia skin rash. She had similar skin rash post prior chemotherapy. Ten point ROS reviewed negative, unless as noted above    Objective:        Intake/Output Summary (Last 24 hours) at 6/10/2020 0041  Last data filed at 6/10/2020 0037  Gross per 24 hour   Intake 1050 ml   Output --   Net 1050 ml      Vitals:   Vitals:    20 2355   BP: (!) 111/46   Pulse: 85   Resp: 16   Temp:    SpO2: 98%     Physical Exam:   GEN Awake female, sitting upright in bed in no apparent distress. Appears given age. EYES Pupils are equally round. No scleral erythema, discharge, or conjunctivitis. HENT Mucous membranes are moist. Oral pharynx without exudates, no evidence of thrush. NECK Supple, no apparent thyromegaly or masses. RESP Clear to auscultation, no wheezes, rales or rhonchi. Symmetric chest movement while on room air. CARDIO/VASC S1/S2 auscultated. Regular rate without appreciable murmurs, rubs, or gallops. No JVD or carotid bruits. Peripheral pulses equal bilaterally and palpable. No peripheral edema. GI Abdomen is soft without significant tenderness, masses, or guarding. Bowel sounds are normoactive. Rectal exam deferred.  No costovertebral angle tenderness. Frank catheter is not present. HEME/LYMPH No palpable cervical lymphadenopathy and no hepatosplenomegaly. No petechiae or ecchymoses. MSK No gross joint deformities. SKIN Normal coloration, warm, dry. NEURO Cranial nerves appear grossly intact, normal speech, no lateralizing weakness. PSYCH Awake, alert, oriented x 4. Affect appropriate. Past Medical History:      Past Medical History:   Diagnosis Date    Cancer [facility])     left breast cancer\"found after bx 2020\" following with Dr Hua Sanches Colonic polyp     Hx of motion sickness     Hyperlipidemia 2009    Hypertension     \"on medication 5+ yrs \" follow with dr Iván Kenny MI (myocardial infarction)     \"they said my ekg showed I  had heart attack over 6 yrs ago but never knew I had one\"    PONV (postoperative nausea and vomiting)     hx of motion sickness    Wears glasses      PSHX:  has a past surgical history that includes  section (); Tubal ligation (); Carpal tunnel release (Right, ); cardiovascular stress test (2009); Rotator cuff repair (Right, 2016); Colonoscopy (); Colonoscopy (2015); Breast biopsy (Left, 2020); Breast biopsy (Left, 2020);  Port Surgery (Right, 03/20/2020); Breast lumpectomy (Left, 3/20/2020); Port Surgery (Right, 3/20/2020); and Breast surgery (Left, 3/30/2020). Allergies: Allergies   Allergen Reactions    Codeine Hives     \"tylenol with codeine is what I had trouble with\"        FAM HX: family history includes Coronary Art Dis in her father; Heart Attack (age of onset: 72) in her brother; High Blood Pressure in her father and mother; Stroke in her father. Soc HX:   Social History     Socioeconomic History    Marital status:      Spouse name: None    Number of children: None    Years of education: None    Highest education level: None   Occupational History    Occupation:    Social Needs    Financial resource strain: None    Food insecurity     Worry: None     Inability: None    Transportation needs     Medical: None     Non-medical: None   Tobacco Use    Smoking status: Never Smoker    Smokeless tobacco: Never Used   Substance and Sexual Activity    Alcohol use: No    Drug use: No    Sexual activity: None   Lifestyle    Physical activity     Days per week: None     Minutes per session: None    Stress: None   Relationships    Social connections     Talks on phone: None     Gets together: None     Attends Yarsanism service: None     Active member of club or organization: None     Attends meetings of clubs or organizations: None     Relationship status: None    Intimate partner violence     Fear of current or ex partner: None     Emotionally abused: None     Physically abused: None     Forced sexual activity: None   Other Topics Concern    None   Social History Narrative    None       Medications:   Medications:    vancomycin  1,500 mg Intravenous Once      Infusions:    sodium chloride       PRN Meds:      Prior to Admission medications    Medication Sig Start Date End Date Taking?  Authorizing Provider   Calcium Carb-Cholecalciferol (CALTRATE 600+D) 600-800 MG-UNIT TABS 1 tab twice per day 4/20/20 Dev Rivera MD   ketorolac (TORADOL) 10 MG tablet Take 1 tablet by mouth every 6 hours as needed for Pain 3/30/20   CHARI Abarca CNP   atenolol (TENORMIN) 100 MG tablet Take 1 tablet by mouth daily  Patient taking differently: Take 100 mg by mouth nightly  1/29/20   Dev Rivera MD   atorvastatin (LIPITOR) 10 MG tablet Take 1 tablet by mouth daily 8/7/19   Dev Rivera MD   triamcinolone (KENALOG) 0.1 % cream  12/10/18   CHARI Jackson CNP   nitroGLYCERIN (NITROSTAT) 0.3 MG SL tablet Place 1 tablet under the tongue every 5 minutes as needed for Chest pain 8/9/18   Dev Rivera MD   clobetasol (OLUX) 0.05 % foam  12/1/17   HIGINIO Cheung   ketoconazole (NIZORAL) 2 % shampoo  12/1/17   HIGINIO Cheung   losartan (COZAAR) 50 MG tablet Take 1 tablet by mouth daily 5/16/17   Truman Leyva MD   aspirin 81 MG tablet Take 81 mg by mouth daily. Historical Provider, MD   Multiple Vitamin (MULTIVITAMIN PO) Take  by mouth daily.       Historical Provider, MD       Electronically signed by Raymond Ortiz MD on 6/10/2020 at 12:41 AM

## 2020-06-10 NOTE — CONSULTS
Infectious Disease Consult Note  6/10/2020   Patient Name: Tali Mejia : 1950   Impression  Neutropenic Fever:  Fever max 102.6  Leukopenia 0.6  Blood Cultures -pending  COVID-19 6/10-pending  CXR:  No acute pulmonary disease, calcific atherosclerotic disease aorta    Stage IIA Left Triple Negative Breast Cancer: Follows with Dr. Yuli Ramey, s/p third cycle chemo (doxorubicin and cytoxan), recommends to continue with cefepime and vanco, received Neulasta 20, will continue granix    Mucositis  Rash over extremities  ANDREEA  Anemia  Hyponatremia  Multi-morbidity: per PMHx:  Left breast cancer, HLD, HTN,   Plan:  Continue meropenem and vancomcycin  Procalcitonin today  CRP x 2  Follow with Dr. Yuli Ramey for plan of care    Thank you for allowing me to consult in the care of this patient.  ------------------------  REASON FOR CONSULT: Infective syndrome     Requested by: Dr. Miriam Carreno is a 78 y.o.  female with left breast cancer on chemotherapy who was admitted 2020 for further evaluation and management of fever, chills, nausea and vomiting for 2 days prior to admission. She has had a cough which has produced clear sputum and a skin rash. Infectious diseases service was consulted to evaluate the pt, and recommend further investigative and therapeutic measures. ROS: Other systems reviewed Including eyes, ENT, respiratory, cardiovascular, GI, , dermatologic, neurologic, psych, hem/lymphatic, musculoskeletal and endocrine were negative other than what is mentioned above. Patient Active Problem List    Diagnosis Date Noted    Hepatic steatosis 2019     Priority: Medium    Neutropenic fever ([address] 06/10/2020    Postoperative nausea 2020    Malignant neoplasm of female breast ([address]     Coronary artery disease involving native coronary artery of native heart without angina pectoris 2018    Osteopenia of multiple sites 2018    Obesity (BMI 30.0-34. 9)
she underwent left breast lumpectomy and sentinel lymph node biopsy on March 20, 2020. Final pathology showed stage IIA INVASIVE DUCTAL CARCINOMA WITH EXTENSIVE NECROSIS, GRADE 3, 2.8 CM. IN GREATEST DIMENSION. SURGICAL MARGINS ARE POSITIVE FOR MALIGNANCY. Ductal Carcinoma In Situ and lobular Carcinoma In Situ are not present. One sentinel lymph node examined was negative for metastasis. No lymphovascular or dermal lymphovascular invasion. Pathologic Stage Classification: pT2, N0, Mx. Repeat surgery for positive margins was done on March 30, 2020 and there was no residual cancer seen. Since she is found to have stage IIA left breast triple negative invasive ductal carcinoma, I recommend for adjuvant chemotherapy and radiation therapy. She had Echocardiogram on 2/2020 and her EF was 51%. Dose dense chemotherapy with doxorubicin/cyclophosphamide was started on May 4, 2020. On June 9, 2020, she [facility] to [facility] with fever, chills, lethargy and tiredness for 2 to 3 days duration. Also she had nausea vomiting. No abdominal pain, change in bowel habits or urinary symptoms. No chest pain, shortness of breath or headache. She had cough productive of clear sputum. Also she reported dysphagia skin rash. She had similar skin rash post prior chemotherapy. She is s/p third cycle of chemo and she received her third cycle on 6/1/20. She received neulasta injection on 6/2/20. I was called to evaluate her.       PAST MEDICAL HISTORY    Past Medical History:   Diagnosis Date    Cancer [facility])     left breast cancer\"found after bx 2/25/2020\" following with Dr Sarai Goel Colonic polyp 2009    Hx of motion sickness     Hyperlipidemia 5/2009    Hypertension     \"on medication 5+ yrs \" follow with dr Kash Liu MI (myocardial infarction)     \"they said my ekg showed I  had heart attack over 6 yrs ago but never knew I had one\"    PONV (postoperative nausea and vomiting)     hx of motion sickness    Wears glasses

## 2020-06-10 NOTE — ED PROVIDER NOTES
BREAST BIOPSY Left 2020    BREAST BIOPSY Left 2020    sentinal node, partial mastectomy left    BREAST LUMPECTOMY Left 3/20/2020    LEFT BREAST LUMPECTOMY WITH NEEDLE LOC AND SENTINEL NODE BIOPSY performed by Cristobal Harris MD at [address] Left 3/30/2020    BREAST LESION RE EXCISION LEFT LUMPECTOMY SITE performed by Cristobal Harris MD at [address] TEST  2009    treadmill, Dr. Toyin Bello Right 's    right     SECTION      COLONOSCOPY          COLONOSCOPY  2015    diverticulosis, internal hemorroids. repeat 5 years. [place] PORT SURGERY Right 2020    PORT SURGERY Right 3/20/2020    PORT INSERTION performed by Cristobal Harris MD at [facility] Right 2016    TUBAL LIGATION         CURRENT MEDICATIONS    Current Outpatient Rx   Medication Sig Dispense Refill    Calcium Carb-Cholecalciferol (CALTRATE 600+D) 600-800 MG-UNIT TABS 1 tab twice per day 60 tablet 2    ketorolac (TORADOL) 10 MG tablet Take 1 tablet by mouth every 6 hours as needed for Pain 20 tablet 0    atenolol (TENORMIN) 100 MG tablet Take 1 tablet by mouth daily (Patient taking differently: Take 100 mg by mouth nightly ) 90 tablet 3    atorvastatin (LIPITOR) 10 MG tablet Take 1 tablet by mouth daily 90 tablet 3    triamcinolone (KENALOG) 0.1 % cream       nitroGLYCERIN (NITROSTAT) 0.3 MG SL tablet Place 1 tablet under the tongue every 5 minutes as needed for Chest pain 25 tablet 0    clobetasol (OLUX) 0.05 % foam       ketoconazole (NIZORAL) 2 % shampoo       losartan (COZAAR) 50 MG tablet Take 1 tablet by mouth daily      aspirin 81 MG tablet Take 81 mg by mouth daily.  Multiple Vitamin (MULTIVITAMIN PO) Take  by mouth daily.            ALLERGIES    Allergies   Allergen Reactions    Codeine Hives     \"tylenol with codeine is what I had trouble with\" SOCIAL AND FAMILY HISTORY    Social History     Socioeconomic History    Marital status:      Spouse name: None    Number of children: None    Years of education: None    Highest education level: None   Occupational History    Occupation:    Social Needs    Financial resource strain: None    Food insecurity     Worry: None     Inability: None    Transportation needs     Medical: None     Non-medical: None   Tobacco Use    Smoking status: Never Smoker    Smokeless tobacco: Never Used   Substance and Sexual Activity    Alcohol use: No    Drug use: No    Sexual activity: None   Lifestyle    Physical activity     Days per week: None     Minutes per session: None    Stress: None   Relationships    Social connections     Talks on phone: None     Gets together: None     Attends Jain service: None     Active member of club or organization: None     Attends meetings of clubs or organizations: None     Relationship status: None    Intimate partner violence     Fear of current or ex partner: None     Emotionally abused: None     Physically abused: None     Forced sexual activity: None   Other Topics Concern    None   Social History Narrative    None     Family History   Problem Relation Age of Onset    Stroke Father     Coronary Art Dis Father     High Blood Pressure Father     Heart Attack Brother 72    High Blood Pressure Mother          PHYSICAL EXAM    VITAL SIGNS: BP (!) 133/54   Pulse 91   Temp 102.6 °F (39.2 °C) (Oral)   Resp 18   Ht 5' 4\" (1.626 m)   Wt 173 lb (78.5 kg)   LMP  (LMP Unknown)   SpO2 98%   BMI 29.70 kg/m²    Constitutional:  Well developed, Well nourished  HENT:  Normocephalic, Atraumatic, PERRL. EOMI. Sclera clear. Conjunctiva normal, No discharge. Neck/Lymphatics: supple, no JVD, no swollen nodes  Cardiovascular:  Normal heart rate, Normal rhythm, No murmurs  Respiratory:  Nonlabored breathing.   Minimal wheezing to lower lung fields and VARIANT SIZE PLATELETS NOTED ON SLIDE    Covid-19 Ambulatory   Result Value Ref Range    Source VIRAL SWAB     SARS-CoV-2 NOT DETECTED NOT DETECTED   C-reactive protein   Result Value Ref Range    CRP, High Sensitivity >300.0 mg/L   Procalcitonin   Result Value Ref Range    Procalcitonin 4.05    Basic Metabolic Panel w/ Reflex to MG   Result Value Ref Range    Sodium 138 135 - 145 MMOL/L    Potassium 3.3 (L) 3.5 - 5.1 MMOL/L    Chloride 110 99 - 110 mMol/L    CO2 21 21 - 32 MMOL/L    Anion Gap 7 4 - 16    BUN 7 6 - 23 MG/DL    CREATININE 0.7 0.6 - 1.1 MG/DL    Glucose 91 70 - 99 MG/DL    Calcium 7.8 (L) 8.3 - 10.6 MG/DL    GFR Non-African American >60 >60 mL/min/1.73m2    GFR African American >60 >60 mL/min/1.73m2   CBC auto differential   Result Value Ref Range    WBC 4.9 4.0 - 10.5 K/CU MM    RBC 2.21 (L) 4.2 - 5.4 M/CU MM    Hemoglobin 7.0 (L) 12.5 - 16.0 GM/DL    Hematocrit 21.2 (L) 37 - 47 %    MCV 95.9 78 - 100 FL    MCH 31.7 (H) 27 - 31 PG    MCHC 33.0 32.0 - 36.0 %    RDW 15.0 (H) 11.7 - 14.9 %    Platelets 436 057 - 998 K/CU MM    MPV 10.6 7.5 - 11.1 FL    Metamyelocytes Relative 10 (H) 0.0 %    Bands Relative 7 5 - 11 %    Segs Relative 53.0 36 - 66 %    Eosinophils % 1.0 0 - 3 %    Basophils % 5.0 (H) 0 - 1 %    Lymphocytes % 19.0 (L) 24 - 44 %    Monocytes % 5.0 (H) 0 - 4 %    Metamyelocytes Absolute 0.49 K/CU MM    Bands Absolute 0.34 K/CU MM    Segs Absolute 2.7 K/CU MM    Eosinophils Absolute 0.1 K/CU MM    Basophils Absolute 0.2 K/CU MM    Lymphocytes Absolute 0.9 K/CU MM    Monocytes Absolute 0.2 K/CU MM    Differential Type MANUAL DIFFERENTIAL     Anisocytosis 1+     Macrocytes 1+     PLT Morphology FEW    Procalcitonin   Result Value Ref Range    Procalcitonin 0.751    C-reactive protein   Result Value Ref Range    CRP, High Sensitivity 165.2 mg/L   Magnesium   Result Value Ref Range    Magnesium 1.6 (L) 1.8 - 2.4 mg/dl           EKG    See attending note    RADIOLOGY    Xr Chest

## 2020-06-11 LAB
ANION GAP SERPL CALCULATED.3IONS-SCNC: 7 MMOL/L (ref 4–16)
ANISOCYTOSIS: ABNORMAL
BANDED NEUTROPHILS ABSOLUTE COUNT: 0.34 K/CU MM
BANDED NEUTROPHILS RELATIVE PERCENT: 7 % (ref 5–11)
BASOPHILS ABSOLUTE: 0.2 K/CU MM
BASOPHILS RELATIVE PERCENT: 5 % (ref 0–1)
BUN BLDV-MCNC: 7 MG/DL (ref 6–23)
CALCIUM SERPL-MCNC: 7.8 MG/DL (ref 8.3–10.6)
CHLORIDE BLD-SCNC: 110 MMOL/L (ref 99–110)
CO2: 21 MMOL/L (ref 21–32)
CREAT SERPL-MCNC: 0.7 MG/DL (ref 0.6–1.1)
DIFFERENTIAL TYPE: ABNORMAL
EOSINOPHILS ABSOLUTE: 0.1 K/CU MM
EOSINOPHILS RELATIVE PERCENT: 1 % (ref 0–3)
GFR AFRICAN AMERICAN: >60 ML/MIN/1.73M2
GFR NON-AFRICAN AMERICAN: >60 ML/MIN/1.73M2
GLUCOSE BLD-MCNC: 91 MG/DL (ref 70–99)
HCT VFR BLD CALC: 21.2 % (ref 37–47)
HEMOGLOBIN: 7 GM/DL (ref 12.5–16)
HIGH SENSITIVE C-REACTIVE PROTEIN: 165.2 MG/L
LYMPHOCYTES ABSOLUTE: 0.9 K/CU MM
LYMPHOCYTES RELATIVE PERCENT: 19 % (ref 24–44)
MACROCYTES: ABNORMAL
MAGNESIUM: 1.6 MG/DL (ref 1.8–2.4)
MCH RBC QN AUTO: 31.7 PG (ref 27–31)
MCHC RBC AUTO-ENTMCNC: 33 % (ref 32–36)
MCV RBC AUTO: 95.9 FL (ref 78–100)
METAMYELOCYTES ABSOLUTE COUNT: 0.49 K/CU MM
METAMYELOCYTES PERCENT: 10 %
MONOCYTES ABSOLUTE: 0.2 K/CU MM
MONOCYTES RELATIVE PERCENT: 5 % (ref 0–4)
PDW BLD-RTO: 15 % (ref 11.7–14.9)
PLATELET # BLD: 140 K/CU MM (ref 140–440)
PLT MORPHOLOGY: ABNORMAL
PMV BLD AUTO: 10.6 FL (ref 7.5–11.1)
POTASSIUM SERPL-SCNC: 3.3 MMOL/L (ref 3.5–5.1)
PROCALCITONIN: 0.75
RBC # BLD: 2.21 M/CU MM (ref 4.2–5.4)
SARS-COV-2: NOT DETECTED
SEGMENTED NEUTROPHILS ABSOLUTE COUNT: 2.7 K/CU MM
SEGMENTED NEUTROPHILS RELATIVE PERCENT: 53 % (ref 36–66)
SODIUM BLD-SCNC: 138 MMOL/L (ref 135–145)
SOURCE: NORMAL
WBC # BLD: 4.9 K/CU MM (ref 4–10.5)

## 2020-06-11 PROCEDURE — 86141 C-REACTIVE PROTEIN HS: CPT

## 2020-06-11 PROCEDURE — 84145 PROCALCITONIN (PCT): CPT

## 2020-06-11 PROCEDURE — 2580000003 HC RX 258: Performed by: INTERNAL MEDICINE

## 2020-06-11 PROCEDURE — 96372 THER/PROPH/DIAG INJ SC/IM: CPT

## 2020-06-11 PROCEDURE — 85027 COMPLETE CBC AUTOMATED: CPT

## 2020-06-11 PROCEDURE — 6360000002 HC RX W HCPCS: Performed by: INTERNAL MEDICINE

## 2020-06-11 PROCEDURE — 99232 SBSQ HOSP IP/OBS MODERATE 35: CPT | Performed by: NURSE PRACTITIONER

## 2020-06-11 PROCEDURE — 96366 THER/PROPH/DIAG IV INF ADDON: CPT

## 2020-06-11 PROCEDURE — 94761 N-INVAS EAR/PLS OXIMETRY MLT: CPT

## 2020-06-11 PROCEDURE — 96361 HYDRATE IV INFUSION ADD-ON: CPT

## 2020-06-11 PROCEDURE — 2580000003 HC RX 258: Performed by: HOSPITALIST

## 2020-06-11 PROCEDURE — 83735 ASSAY OF MAGNESIUM: CPT

## 2020-06-11 PROCEDURE — 6370000000 HC RX 637 (ALT 250 FOR IP): Performed by: HOSPITALIST

## 2020-06-11 PROCEDURE — 85007 BL SMEAR W/DIFF WBC COUNT: CPT

## 2020-06-11 PROCEDURE — 6370000000 HC RX 637 (ALT 250 FOR IP): Performed by: PHYSICIAN ASSISTANT

## 2020-06-11 PROCEDURE — 80048 BASIC METABOLIC PNL TOTAL CA: CPT

## 2020-06-11 PROCEDURE — 1200000000 HC SEMI PRIVATE

## 2020-06-11 PROCEDURE — G0378 HOSPITAL OBSERVATION PER HR: HCPCS

## 2020-06-11 PROCEDURE — 2580000003 HC RX 258: Performed by: PHYSICIAN ASSISTANT

## 2020-06-11 PROCEDURE — 6370000000 HC RX 637 (ALT 250 FOR IP): Performed by: INTERNAL MEDICINE

## 2020-06-11 RX ORDER — LOPERAMIDE HYDROCHLORIDE 2 MG/1
2 CAPSULE ORAL 4 TIMES DAILY PRN
Status: DISCONTINUED | OUTPATIENT
Start: 2020-06-11 | End: 2020-06-13 | Stop reason: HOSPADM

## 2020-06-11 RX ORDER — LOPERAMIDE HYDROCHLORIDE 2 MG/1
2 CAPSULE ORAL ONCE
Status: COMPLETED | OUTPATIENT
Start: 2020-06-11 | End: 2020-06-11

## 2020-06-11 RX ADMIN — MEROPENEM 1 G: 1 INJECTION, POWDER, FOR SOLUTION INTRAVENOUS at 06:35

## 2020-06-11 RX ADMIN — ATORVASTATIN CALCIUM 10 MG: 10 TABLET, FILM COATED ORAL at 10:37

## 2020-06-11 RX ADMIN — SODIUM CHLORIDE, PRESERVATIVE FREE 10 ML: 5 INJECTION INTRAVENOUS at 22:12

## 2020-06-11 RX ADMIN — ASPIRIN 81 MG 81 MG: 81 TABLET ORAL at 10:37

## 2020-06-11 RX ADMIN — POTASSIUM CHLORIDE 40 MEQ: 1500 TABLET, EXTENDED RELEASE ORAL at 18:34

## 2020-06-11 RX ADMIN — SODIUM CHLORIDE: 9 INJECTION, SOLUTION INTRAVENOUS at 00:41

## 2020-06-11 RX ADMIN — MEROPENEM 1 G: 1 INJECTION, POWDER, FOR SOLUTION INTRAVENOUS at 13:25

## 2020-06-11 RX ADMIN — VANCOMYCIN HYDROCHLORIDE 1250 MG: 5 INJECTION, POWDER, LYOPHILIZED, FOR SOLUTION INTRAVENOUS at 00:40

## 2020-06-11 RX ADMIN — SODIUM CHLORIDE: 9 INJECTION, SOLUTION INTRAVENOUS at 22:11

## 2020-06-11 RX ADMIN — LOPERAMIDE HYDROCHLORIDE 2 MG: 2 CAPSULE ORAL at 11:11

## 2020-06-11 RX ADMIN — ENOXAPARIN SODIUM 40 MG: 40 INJECTION SUBCUTANEOUS at 10:37

## 2020-06-11 RX ADMIN — TBO-FILGRASTIM 300 MCG: 300 INJECTION, SOLUTION SUBCUTANEOUS at 11:12

## 2020-06-11 RX ADMIN — MEROPENEM 1 G: 1 INJECTION, POWDER, FOR SOLUTION INTRAVENOUS at 22:12

## 2020-06-11 RX ADMIN — LOPERAMIDE HYDROCHLORIDE 2 MG: 2 CAPSULE ORAL at 06:52

## 2020-06-11 RX ADMIN — SODIUM CHLORIDE: 9 INJECTION, SOLUTION INTRAVENOUS at 11:29

## 2020-06-11 ASSESSMENT — PAIN SCALES - GENERAL
PAINLEVEL_OUTOF10: 0
PAINLEVEL_OUTOF10: 0

## 2020-06-11 NOTE — CARE COORDINATION
Phoned and spoke with patient on phone in [facility] regarding discharge planning. Patient is from home and is independent. Patient has PCP and insurance with RX coverage and denied needing any assistance with prescriptions. Patient stated that she plans to return home on discharge and denied having any discharge needs.

## 2020-06-12 LAB
ANION GAP SERPL CALCULATED.3IONS-SCNC: 7 MMOL/L (ref 4–16)
ANISOCYTOSIS: ABNORMAL
BANDED NEUTROPHILS ABSOLUTE COUNT: 3.53 K/CU MM
BANDED NEUTROPHILS RELATIVE PERCENT: 31 % (ref 5–11)
BUN BLDV-MCNC: 4 MG/DL (ref 6–23)
CALCIUM SERPL-MCNC: 7.4 MG/DL (ref 8.3–10.6)
CHLORIDE BLD-SCNC: 112 MMOL/L (ref 99–110)
CO2: 21 MMOL/L (ref 21–32)
CREAT SERPL-MCNC: 0.6 MG/DL (ref 0.6–1.1)
DIFFERENTIAL TYPE: ABNORMAL
DOHLE BODIES: PRESENT
EOSINOPHILS ABSOLUTE: 0.1 K/CU MM
EOSINOPHILS RELATIVE PERCENT: 1 % (ref 0–3)
GFR AFRICAN AMERICAN: >60 ML/MIN/1.73M2
GFR NON-AFRICAN AMERICAN: >60 ML/MIN/1.73M2
GLUCOSE BLD-MCNC: 72 MG/DL (ref 70–99)
HCT VFR BLD CALC: 19.9 % (ref 37–47)
HEMOGLOBIN: 6.7 GM/DL (ref 12.5–16)
LYMPHOCYTES ABSOLUTE: 0.5 K/CU MM
LYMPHOCYTES RELATIVE PERCENT: 4 % (ref 24–44)
MAGNESIUM: 1.6 MG/DL (ref 1.8–2.4)
MCH RBC QN AUTO: 31.9 PG (ref 27–31)
MCHC RBC AUTO-ENTMCNC: 33.7 % (ref 32–36)
MCV RBC AUTO: 94.8 FL (ref 78–100)
METAMYELOCYTES ABSOLUTE COUNT: 0.34 K/CU MM
METAMYELOCYTES PERCENT: 3 %
MONOCYTES ABSOLUTE: 0.8 K/CU MM
MONOCYTES RELATIVE PERCENT: 7 % (ref 0–4)
MYELOCYTE PERCENT: 1 %
MYELOCYTES ABSOLUTE COUNT: 0.11 K/CU MM
PDW BLD-RTO: 15.4 % (ref 11.7–14.9)
PLATELET # BLD: 129 K/CU MM (ref 140–440)
PLT MORPHOLOGY: ABNORMAL
PMV BLD AUTO: 10.7 FL (ref 7.5–11.1)
POLYCHROMASIA: ABNORMAL
POTASSIUM SERPL-SCNC: 3.5 MMOL/L (ref 3.5–5.1)
RBC # BLD: 2.1 M/CU MM (ref 4.2–5.4)
SEGMENTED NEUTROPHILS ABSOLUTE COUNT: 6 K/CU MM
SEGMENTED NEUTROPHILS RELATIVE PERCENT: 53 % (ref 36–66)
SODIUM BLD-SCNC: 140 MMOL/L (ref 135–145)
TOXIC GRANULATION: PRESENT
WBC # BLD: 11.4 K/CU MM (ref 4–10.5)

## 2020-06-12 PROCEDURE — 96366 THER/PROPH/DIAG IV INF ADDON: CPT

## 2020-06-12 PROCEDURE — 6360000002 HC RX W HCPCS: Performed by: INTERNAL MEDICINE

## 2020-06-12 PROCEDURE — 86922 COMPATIBILITY TEST ANTIGLOB: CPT

## 2020-06-12 PROCEDURE — 99232 SBSQ HOSP IP/OBS MODERATE 35: CPT | Performed by: NURSE PRACTITIONER

## 2020-06-12 PROCEDURE — 6370000000 HC RX 637 (ALT 250 FOR IP): Performed by: INTERNAL MEDICINE

## 2020-06-12 PROCEDURE — 86901 BLOOD TYPING SEROLOGIC RH(D): CPT

## 2020-06-12 PROCEDURE — G0378 HOSPITAL OBSERVATION PER HR: HCPCS

## 2020-06-12 PROCEDURE — 96372 THER/PROPH/DIAG INJ SC/IM: CPT

## 2020-06-12 PROCEDURE — 94761 N-INVAS EAR/PLS OXIMETRY MLT: CPT

## 2020-06-12 PROCEDURE — 2580000003 HC RX 258: Performed by: INTERNAL MEDICINE

## 2020-06-12 PROCEDURE — 96361 HYDRATE IV INFUSION ADD-ON: CPT

## 2020-06-12 PROCEDURE — 85007 BL SMEAR W/DIFF WBC COUNT: CPT

## 2020-06-12 PROCEDURE — 83735 ASSAY OF MAGNESIUM: CPT

## 2020-06-12 PROCEDURE — 80048 BASIC METABOLIC PNL TOTAL CA: CPT

## 2020-06-12 PROCEDURE — 86850 RBC ANTIBODY SCREEN: CPT

## 2020-06-12 PROCEDURE — 1200000000 HC SEMI PRIVATE

## 2020-06-12 PROCEDURE — P9016 RBC LEUKOCYTES REDUCED: HCPCS

## 2020-06-12 PROCEDURE — 96375 TX/PRO/DX INJ NEW DRUG ADDON: CPT

## 2020-06-12 PROCEDURE — 85027 COMPLETE CBC AUTOMATED: CPT

## 2020-06-12 PROCEDURE — 6360000002 HC RX W HCPCS: Performed by: HOSPITALIST

## 2020-06-12 PROCEDURE — 86900 BLOOD TYPING SEROLOGIC ABO: CPT

## 2020-06-12 PROCEDURE — 99221 1ST HOSP IP/OBS SF/LOW 40: CPT | Performed by: INTERNAL MEDICINE

## 2020-06-12 PROCEDURE — 36430 TRANSFUSION BLD/BLD COMPNT: CPT

## 2020-06-12 RX ORDER — FUROSEMIDE 10 MG/ML
20 INJECTION INTRAMUSCULAR; INTRAVENOUS SEE ADMIN INSTRUCTIONS
Status: COMPLETED | OUTPATIENT
Start: 2020-06-12 | End: 2020-06-12

## 2020-06-12 RX ORDER — 0.9 % SODIUM CHLORIDE 0.9 %
250 INTRAVENOUS SOLUTION INTRAVENOUS ONCE
Status: DISCONTINUED | OUTPATIENT
Start: 2020-06-12 | End: 2020-06-13 | Stop reason: HOSPADM

## 2020-06-12 RX ORDER — 0.9 % SODIUM CHLORIDE 0.9 %
20 INTRAVENOUS SOLUTION INTRAVENOUS ONCE
Status: DISCONTINUED | OUTPATIENT
Start: 2020-06-12 | End: 2020-06-13 | Stop reason: HOSPADM

## 2020-06-12 RX ADMIN — ATORVASTATIN CALCIUM 10 MG: 10 TABLET, FILM COATED ORAL at 09:41

## 2020-06-12 RX ADMIN — ENOXAPARIN SODIUM 40 MG: 40 INJECTION SUBCUTANEOUS at 09:41

## 2020-06-12 RX ADMIN — FUROSEMIDE 20 MG: 10 INJECTION, SOLUTION INTRAVENOUS at 16:30

## 2020-06-12 RX ADMIN — ASPIRIN 81 MG 81 MG: 81 TABLET ORAL at 09:41

## 2020-06-12 RX ADMIN — SODIUM CHLORIDE, PRESERVATIVE FREE 10 ML: 5 INJECTION INTRAVENOUS at 21:47

## 2020-06-12 RX ADMIN — SODIUM CHLORIDE, PRESERVATIVE FREE 10 ML: 5 INJECTION INTRAVENOUS at 09:41

## 2020-06-12 RX ADMIN — VANCOMYCIN HYDROCHLORIDE 1250 MG: 5 INJECTION, POWDER, LYOPHILIZED, FOR SOLUTION INTRAVENOUS at 01:27

## 2020-06-12 RX ADMIN — MEROPENEM 1 G: 1 INJECTION, POWDER, FOR SOLUTION INTRAVENOUS at 21:47

## 2020-06-12 RX ADMIN — TBO-FILGRASTIM 300 MCG: 300 INJECTION, SOLUTION SUBCUTANEOUS at 06:14

## 2020-06-12 RX ADMIN — MEROPENEM 1 G: 1 INJECTION, POWDER, FOR SOLUTION INTRAVENOUS at 06:14

## 2020-06-12 RX ADMIN — MEROPENEM 1 G: 1 INJECTION, POWDER, FOR SOLUTION INTRAVENOUS at 16:30

## 2020-06-12 ASSESSMENT — PAIN SCALES - GENERAL
PAINLEVEL_OUTOF10: 0

## 2020-06-12 NOTE — PROGRESS NOTES
Infectious Disease Progress Note  2020   Patient Name: Consuello Severs : 1950   Impression  · Neutropenic Fever:  § Fever max 102.6 initially at admission, afebrile since   § Neutropenia has resolved  § Blood Cultures -0-NGTD  § COVID-19 6/10-Not Detected  § CXR:  No acute pulmonary disease, calcific atherosclerotic disease aorta     · Stage IIA Left Triple Negative Breast Cancer: Follows with Dr. Twan Godoy, s/p third cycle chemo (doxorubicin and cytoxan), recommends to continue with cefepime and vanco, received Neulasta 20, will continue granix    ? Mucositis  ? Rash over extremities  ? ANDREEA  ? Anemia:  Symptomatic secondary to chemo-induced bone marrow suppression  ? Hyponatremia  ? Mediport: Site does not appear infected, no erythema, edema or drainage  · Multi-morbidity: per PMHx:  Left breast cancer, HLD, HTN   Plan:  · Would prefer IV meropenem until 6/15 but patient is requesting to go home, patient states would prefer oral ABX therapy, at DC please change to po Levaquin 500 mg q24h through 6/15  · DC vancomycin-blood cultures are NGTD at 48 hours  · Follow up with Dr. Twan Godoy at discharge please  · OK to DC from ID standpoint when ready    Ongoing Antimicrobial Therapy  Meropenem 6/10-    Completed Antimicrobial Therapy  Cefepime ? Vancomycin 6/10-?  ? History:? Interval history noted. Chief complaint:  Neutropenic fever. Denies n/v/f or untoward effects of antibiotics. States is feeling better today.      Physical Exam:  Vital Signs: /62   Pulse 89   Temp 98.3 °F (36.8 °C) (Oral)   Resp 18   Ht 5' 4\" (1.626 m)   Wt 178 lb 5.6 oz (80.9 kg)   LMP  (LMP Unknown)   SpO2 98%   BMI 30.61 kg/m²     Gen: alert and oriented X3, no distress  Skin: no stigmata of endocarditis, scattered areas of small, circular, reddened, flat areas over all 4 extremities, alopecia present  HEMT: AT/NC Oropharynx pink, moist, a few areas of dried lesions around nostrils and inside lips; dentition in good
Messaged dr Llanes Most via perfect serve: \"admit for covid r/t which is not necessary. she is current cancer pt and curret ca tx. neutropenic precautions. wbc 0.5. hospitalist wants ok and order from you to transfer her out.  Thanks\"
Pt was admitted with neutropenic fever started on Vanc, cefepime, rule out covid pending, hem/ID consulted, pt clinically improved, fever improved.
movement. Heart: RRR and no MRG. Abd: soft, non-distended, no tenderness, no hepatomegaly. Normoactive bowel sounds. Ext: no clubbing, cyanosis, or edema  Neuro: Mental status intact. CN 2-12 intact and no focal sensory or motor deficits     Radiologic / Imaging / TESTING  6/9/20 XR Chest Portable:  Impression   No acute pulmonary disease.       Calcific atherosclerotic disease aorta. Labs:    No results found for this or any previous visit (from the past 24 hour(s)). CULTURE results: Invalid input(s): BLOOD CULTURE,  URINE CULTURE, SURGICAL CULTURE    Diagnosis:  Patient Active Problem List   Diagnosis    Essential hypertension    Hyperlipidemia    History of colon polyps    Obesity (BMI 30.0-34. 9)    Osteopenia of multiple sites    Coronary artery disease involving native coronary artery of native heart without angina pectoris    Hepatic steatosis    Malignant ne[facility] of female breast ([facility]    Postoperative nausea    Neutropenic fever (HCC)       Active Problems  Active Problems:    Neutropenic fever (HCC)  Resolved Problems:    * No resolved hospital problems. *    Electronically signed by: Electronically signed by CHARI Cruz CNP on 6/11/2020 at 8:13 AM
clear

## 2020-06-12 NOTE — PLAN OF CARE
Problem: Infection:  Goal: Will remain free from infection  Description: Will remain free from infection  Outcome: Ongoing

## 2020-06-12 NOTE — CARE COORDINATION
No changes to d/c planning noted. Remain home no needs identified. Covid not detected. Pt received 1 unit PRBC, neutropoa d/t chemo per Md.   CM available as needed

## 2020-06-13 VITALS
HEIGHT: 64 IN | DIASTOLIC BLOOD PRESSURE: 68 MMHG | OXYGEN SATURATION: 96 % | WEIGHT: 174.8 LBS | TEMPERATURE: 98.3 F | RESPIRATION RATE: 17 BRPM | BODY MASS INDEX: 29.84 KG/M2 | HEART RATE: 95 BPM | SYSTOLIC BLOOD PRESSURE: 147 MMHG

## 2020-06-13 LAB
ABO/RH: NORMAL
ANION GAP SERPL CALCULATED.3IONS-SCNC: 12 MMOL/L (ref 4–16)
ANISOCYTOSIS: ABNORMAL
ANTIBODY SCREEN: NEGATIVE
BANDED NEUTROPHILS ABSOLUTE COUNT: 10.2 K/CU MM
BANDED NEUTROPHILS RELATIVE PERCENT: 31 % (ref 5–11)
BUN BLDV-MCNC: 3 MG/DL (ref 6–23)
CALCIUM SERPL-MCNC: 8.3 MG/DL (ref 8.3–10.6)
CHLORIDE BLD-SCNC: 104 MMOL/L (ref 99–110)
CO2: 26 MMOL/L (ref 21–32)
COMPONENT: NORMAL
CREAT SERPL-MCNC: 0.6 MG/DL (ref 0.6–1.1)
CROSSMATCH RESULT: NORMAL
DIFFERENTIAL TYPE: ABNORMAL
DOHLE BODIES: PRESENT
GFR AFRICAN AMERICAN: >60 ML/MIN/1.73M2
GFR NON-AFRICAN AMERICAN: >60 ML/MIN/1.73M2
GLUCOSE BLD-MCNC: 77 MG/DL (ref 70–99)
HCT VFR BLD CALC: 30.3 % (ref 37–47)
HEMOGLOBIN: 9.7 GM/DL (ref 12.5–16)
HIGH SENSITIVE C-REACTIVE PROTEIN: 95.2 MG/L
LYMPHOCYTES ABSOLUTE: 1.6 K/CU MM
LYMPHOCYTES RELATIVE PERCENT: 5 % (ref 24–44)
MCH RBC QN AUTO: 29.8 PG (ref 27–31)
MCHC RBC AUTO-ENTMCNC: 32 % (ref 32–36)
MCV RBC AUTO: 92.9 FL (ref 78–100)
METAMYELOCYTES ABSOLUTE COUNT: 1.32 K/CU MM
METAMYELOCYTES PERCENT: 4 %
MONOCYTES ABSOLUTE: 2.6 K/CU MM
MONOCYTES RELATIVE PERCENT: 8 % (ref 0–4)
MYELOCYTE PERCENT: 2 %
MYELOCYTES ABSOLUTE COUNT: 0.66 K/CU MM
PDW BLD-RTO: 16.3 % (ref 11.7–14.9)
PLATELET # BLD: 219 K/CU MM (ref 140–440)
PLT MORPHOLOGY: ABNORMAL
PMV BLD AUTO: 10.3 FL (ref 7.5–11.1)
POLYCHROMASIA: ABNORMAL
POTASSIUM SERPL-SCNC: 3.6 MMOL/L (ref 3.5–5.1)
RBC # BLD: 3.26 M/CU MM (ref 4.2–5.4)
SEGMENTED NEUTROPHILS ABSOLUTE COUNT: 16.5 K/CU MM
SEGMENTED NEUTROPHILS RELATIVE PERCENT: 50 % (ref 36–66)
SODIUM BLD-SCNC: 142 MMOL/L (ref 135–145)
STATUS: NORMAL
TOXIC GRANULATION: PRESENT
TRANSFUSION STATUS: NORMAL
UNIT DIVISION: 0
UNIT NUMBER: NORMAL
WBC # BLD: 32.9 K/CU MM (ref 4–10.5)

## 2020-06-13 PROCEDURE — G0378 HOSPITAL OBSERVATION PER HR: HCPCS

## 2020-06-13 PROCEDURE — 86141 C-REACTIVE PROTEIN HS: CPT

## 2020-06-13 PROCEDURE — 85007 BL SMEAR W/DIFF WBC COUNT: CPT

## 2020-06-13 PROCEDURE — 6360000002 HC RX W HCPCS: Performed by: INTERNAL MEDICINE

## 2020-06-13 PROCEDURE — 80048 BASIC METABOLIC PNL TOTAL CA: CPT

## 2020-06-13 PROCEDURE — 96372 THER/PROPH/DIAG INJ SC/IM: CPT

## 2020-06-13 PROCEDURE — 85027 COMPLETE CBC AUTOMATED: CPT

## 2020-06-13 PROCEDURE — 96366 THER/PROPH/DIAG IV INF ADDON: CPT

## 2020-06-13 PROCEDURE — 6360000002 HC RX W HCPCS: Performed by: HOSPITALIST

## 2020-06-13 PROCEDURE — 2580000003 HC RX 258: Performed by: INTERNAL MEDICINE

## 2020-06-13 PROCEDURE — 6370000000 HC RX 637 (ALT 250 FOR IP): Performed by: INTERNAL MEDICINE

## 2020-06-13 RX ORDER — LEVOFLOXACIN 500 MG/1
500 TABLET, FILM COATED ORAL DAILY
Qty: 3 TABLET | Refills: 0 | Status: SHIPPED | OUTPATIENT
Start: 2020-06-13 | End: 2020-06-16

## 2020-06-13 RX ORDER — HEPARIN SODIUM (PORCINE) LOCK FLUSH IV SOLN 100 UNIT/ML 100 UNIT/ML
100 SOLUTION INTRAVENOUS PRN
Status: DISCONTINUED | OUTPATIENT
Start: 2020-06-13 | End: 2020-06-13 | Stop reason: HOSPADM

## 2020-06-13 RX ADMIN — MEROPENEM 1 G: 1 INJECTION, POWDER, FOR SOLUTION INTRAVENOUS at 13:19

## 2020-06-13 RX ADMIN — ATORVASTATIN CALCIUM 10 MG: 10 TABLET, FILM COATED ORAL at 08:32

## 2020-06-13 RX ADMIN — MEROPENEM 1 G: 1 INJECTION, POWDER, FOR SOLUTION INTRAVENOUS at 05:54

## 2020-06-13 RX ADMIN — ASPIRIN 81 MG 81 MG: 81 TABLET ORAL at 08:32

## 2020-06-13 RX ADMIN — SODIUM CHLORIDE, PRESERVATIVE FREE 10 ML: 5 INJECTION INTRAVENOUS at 08:32

## 2020-06-13 RX ADMIN — Medication 100 UNITS: at 14:45

## 2020-06-13 RX ADMIN — ENOXAPARIN SODIUM 40 MG: 40 INJECTION SUBCUTANEOUS at 08:32

## 2020-06-13 ASSESSMENT — PAIN SCALES - GENERAL: PAINLEVEL_OUTOF10: 0

## 2020-06-13 NOTE — DISCHARGE SUMMARY
Stable    Discharge Medications:      Alo Old   Home Medication Instructions [id number]    Printed on:06/13/20 2169   Medication Information                      aspirin 81 MG tablet  Take 81 mg by mouth daily. atenolol (TENORMIN) 100 MG tablet  Take 1 tablet by mouth daily             atorvastatin (LIPITOR) 10 MG tablet  Take 1 tablet by mouth daily             Calcium Carb-Cholecalciferol (CALTRATE 600+D) 600-800 MG-UNIT TABS  1 tab twice per day             clobetasol (OLUX) 0.05 % foam               ketoconazole (NIZORAL) 2 % shampoo               ketorolac (TORADOL) 10 MG tablet  Take 1 tablet by mouth every 6 hours as needed for Pain             levoFLOXacin (LEVAQUIN) 500 MG tablet  Take 1 tablet by mouth daily for 3 days             losartan (COZAAR) 50 MG tablet  Take 1 tablet by mouth daily             Multiple Vitamin (MULTIVITAMIN PO)  Take  by mouth daily. nitroGLYCERIN (NITROSTAT) 0.3 MG SL tablet  Place 1 tablet under the tongue every 5 minutes as needed for Chest pain             triamcinolone (KENALOG) 0.1 % cream                   Objective Findings at Discharge:   BP (!) 147/68   Pulse 95   Temp 98.3 °F (36.8 °C) (Oral)   Resp 17   Ht 5' 4\" (1.626 m)   Wt 174 lb 12.8 oz (79.3 kg)   LMP  (LMP Unknown)   SpO2 96%   BMI 30.00 kg/m²            PHYSICAL EXAM   GEN    Awake female, cooperative, no apparent distress. Obese  RESP  clear. Symmetric chest movement . CARDIO/VASC           S1/S2 auscultated. Regular rate. GI        Abdomen is soft without significant tenderness, Bowel sounds are normoactive. MSK    No gross joint deformities. Spontaneous movement of all extremities  SKIN    Normal coloration, warm, dry. NEURO           normal speech, no lateralizing weakness. PSYCH            Awake, alert, oriented x 4. Affect appropriate.     BMP/CBC  Recent Labs     06/11/20  1408 06/12/20  0600 06/13/20  0547    140 142   K 3.3* 3.5 3.6

## 2020-06-14 LAB
CULTURE: NORMAL
CULTURE: NORMAL
Lab: NORMAL
Lab: NORMAL
SPECIMEN: NORMAL
SPECIMEN: NORMAL

## 2020-06-15 ENCOUNTER — CARE COORDINATION (OUTPATIENT)
Dept: CASE MANAGEMENT | Age: 70
End: 2020-06-15

## 2020-06-16 ENCOUNTER — CARE COORDINATION (OUTPATIENT)
Dept: CASE MANAGEMENT | Age: 70
End: 2020-06-16

## 2020-06-16 ENCOUNTER — HOSPITAL ENCOUNTER (OUTPATIENT)
Age: 70
Setting detail: SPECIMEN
Discharge: HOME | End: 2020-06-16
Payer: MEDICARE

## 2020-06-16 ENCOUNTER — OFFICE VISIT (OUTPATIENT)
Dept: SURGERY | Age: 70
End: 2020-06-16

## 2020-06-16 VITALS
OXYGEN SATURATION: 99 % | TEMPERATURE: 97 F | SYSTOLIC BLOOD PRESSURE: 134 MMHG | DIASTOLIC BLOOD PRESSURE: 66 MMHG | HEART RATE: 68 BPM

## 2020-06-16 PROCEDURE — 99024 POSTOP FOLLOW-UP VISIT: CPT | Performed by: NURSE PRACTITIONER

## 2020-06-16 PROCEDURE — 87070 CULTURE OTHR SPECIMN AEROBIC: CPT

## 2020-06-16 PROCEDURE — 87075 CULTR BACTERIA EXCEPT BLOOD: CPT

## 2020-06-16 RX ORDER — SULFAMETHOXAZOLE AND TRIMETHOPRIM 800; 160 MG/1; MG/1
1 TABLET ORAL 2 TIMES DAILY
Qty: 20 TABLET | Refills: 0 | Status: SHIPPED | OUTPATIENT
Start: 2020-06-16 | End: 2020-06-26

## 2020-06-16 NOTE — CARE COORDINATION
[facility] Transitions Initial Follow Up Call    Call within 2 business days of discharge: Yes    Patient: Maggie Avila Patient : 1950   MRN: [id number]  Reason for Admission:   Fever; emesis  Discharge Date: 20 RARS: Readmission Risk Score: 9      Last Discharge [facility]       Complaint Diagnosis Description Type Department Provider    20 Fever; Emesis Neutropenic fever ([address] . .. ED to Hosp-Admission (Discharged) (Bree Metcalf) Manpreet Small MD; Jonel Juarez MD... Facility:   [address] [place] [address]      Follow Up:  COVID-19 Risk Monitoring:    COVID-19:  Not detected. Attempted to reach patient for Care Transition follow up. No answer to phone. Message left with CTN contact information and request for call back. Spoke briefly with patient's approved contact : Nora Jacob. Reports that patient was feeling better. Patient contact reports that patient \"takes care of herself\". Reports that patient is \"probably taking a nap\". Agreeable to forward CTN contact information to patient with request for call back.    Future Appointments   Date Time Provider Tiny Ornelas   2020  1:20 PM CHARI Ballard - CNP [facility]   2020 10:00 AM SCHEDULE, [facility] TREATMENT [facility] [place]   2020  9:30 AM CHARI Weinstein CNP [facility] MED ONC [facility]   2020  8:30 AM Duffy Krabbe, MD [facility]   2020  9:10 AM Rebeka Rush MD [facility]       Nga Henson RN

## 2020-06-16 NOTE — PROGRESS NOTES
Devorah Powers is 3 months post-op: lumpectomy with sentinel node bx and mediport insertion. Presenting for possible fluid collection at incision site    S:  Doing well. Patient has noted excessive redness, swelling and pain. O:  Axillary incision with fluid collection    A:  Satisfactory course. P: Drained purulent material, obtained culture and will start on bactrim. Patient to return Thursday for follow up. Patient is to return as needed for redness, swelling, discomfort, or any concern about her  surgery.

## 2020-06-17 ENCOUNTER — CARE COORDINATION (OUTPATIENT)
Dept: CASE MANAGEMENT | Age: 70
End: 2020-06-17

## 2020-06-18 ENCOUNTER — OFFICE VISIT (OUTPATIENT)
Dept: SURGERY | Age: 70
End: 2020-06-18

## 2020-06-18 VITALS
HEART RATE: 70 BPM | BODY MASS INDEX: 29.73 KG/M2 | TEMPERATURE: 97.3 F | SYSTOLIC BLOOD PRESSURE: 123 MMHG | DIASTOLIC BLOOD PRESSURE: 50 MMHG | WEIGHT: 173.2 LBS

## 2020-06-18 PROCEDURE — 99024 POSTOP FOLLOW-UP VISIT: CPT | Performed by: NURSE PRACTITIONER

## 2020-06-21 LAB
CULTURE: ABNORMAL
CULTURE: ABNORMAL
Lab: ABNORMAL
SPECIMEN: ABNORMAL

## 2020-06-23 ENCOUNTER — OFFICE VISIT (OUTPATIENT)
Dept: SURGERY | Age: 70
End: 2020-06-23

## 2020-06-23 VITALS
TEMPERATURE: 97.2 F | OXYGEN SATURATION: 99 % | HEART RATE: 60 BPM | DIASTOLIC BLOOD PRESSURE: 50 MMHG | SYSTOLIC BLOOD PRESSURE: 107 MMHG

## 2020-06-23 PROCEDURE — 99024 POSTOP FOLLOW-UP VISIT: CPT | Performed by: NURSE PRACTITIONER

## 2020-06-23 NOTE — PROGRESS NOTES
Xander Rivas is 3 months post-op: lumpectomy with sentinel node bx and mediport insertion. Presenting for  fluid collection at incision site    S:  Doing well. Redness, swelling and pain has improved. O:  Axillary incision with fluid collection; drainage has stopped    A:  Satisfactory course. P: Culture staphylococcus coagulase-negative. Return next week to recheck   Patient is to return as needed for redness, swelling, discomfort, or any concern about her surgery.

## 2020-06-26 RX ORDER — DIPHENHYDRAMINE HYDROCHLORIDE 50 MG/ML
25 INJECTION INTRAMUSCULAR; INTRAVENOUS ONCE
Status: CANCELLED | OUTPATIENT
Start: 2020-06-29

## 2020-06-29 ENCOUNTER — HOSPITAL ENCOUNTER (OUTPATIENT)
Dept: INFUSION THERAPY | Age: 70
Discharge: HOME | End: 2020-06-29
Payer: MEDICARE

## 2020-06-29 LAB
ALBUMIN SERPL-MCNC: 4.2 GM/DL (ref 3.4–5)
ALP BLD-CCNC: 60 IU/L (ref 40–128)
ALT SERPL-CCNC: 31 U/L (ref 10–40)
ANION GAP SERPL CALCULATED.3IONS-SCNC: 8 MMOL/L (ref 4–16)
AST SERPL-CCNC: 32 IU/L (ref 15–37)
BASOPHILS ABSOLUTE: 0.3 K/CU MM
BASOPHILS RELATIVE PERCENT: 5.1 % (ref 0–1)
BILIRUB SERPL-MCNC: 0.5 MG/DL (ref 0–1)
BUN BLDV-MCNC: 20 MG/DL (ref 6–23)
CALCIUM SERPL-MCNC: 10.1 MG/DL (ref 8.3–10.6)
CHLORIDE BLD-SCNC: 107 MMOL/L (ref 99–110)
CO2: 26 MMOL/L (ref 21–32)
CREAT SERPL-MCNC: 0.9 MG/DL (ref 0.6–1.1)
DIFFERENTIAL TYPE: ABNORMAL
EOSINOPHILS ABSOLUTE: 0.2 K/CU MM
EOSINOPHILS RELATIVE PERCENT: 3.1 % (ref 0–3)
GFR AFRICAN AMERICAN: >60 ML/MIN/1.73M2
GFR NON-AFRICAN AMERICAN: >60 ML/MIN/1.73M2
GLUCOSE BLD-MCNC: 95 MG/DL (ref 70–99)
HCT VFR BLD CALC: 31.9 % (ref 37–47)
HEMOGLOBIN: 10.4 GM/DL (ref 12.5–16)
LYMPHOCYTES ABSOLUTE: 1 K/CU MM
LYMPHOCYTES RELATIVE PERCENT: 15.8 % (ref 24–44)
MCH RBC QN AUTO: 30.7 PG (ref 27–31)
MCHC RBC AUTO-ENTMCNC: 32.6 % (ref 32–36)
MCV RBC AUTO: 94.1 FL (ref 78–100)
MONOCYTES ABSOLUTE: 1.2 K/CU MM
MONOCYTES RELATIVE PERCENT: 18.1 % (ref 0–4)
PDW BLD-RTO: 18.7 % (ref 11.7–14.9)
PLATELET # BLD: 416 K/CU MM (ref 140–440)
PMV BLD AUTO: 9.3 FL (ref 7.5–11.1)
POTASSIUM SERPL-SCNC: 4.8 MMOL/L (ref 3.5–5.1)
RBC # BLD: 3.39 M/CU MM (ref 4.2–5.4)
SEGMENTED NEUTROPHILS ABSOLUTE COUNT: 3.8 K/CU MM
SEGMENTED NEUTROPHILS RELATIVE PERCENT: 57.9 % (ref 36–66)
SODIUM BLD-SCNC: 141 MMOL/L (ref 135–145)
TOTAL PROTEIN: 6.6 GM/DL (ref 6.4–8.2)
WBC # BLD: 6.5 K/CU MM (ref 4–10.5)

## 2020-06-29 PROCEDURE — 36415 COLL VENOUS BLD VENIPUNCTURE: CPT

## 2020-06-29 PROCEDURE — 2580000003 HC RX 258: Performed by: INTERNAL MEDICINE

## 2020-06-29 PROCEDURE — 96375 TX/PRO/DX INJ NEW DRUG ADDON: CPT

## 2020-06-29 PROCEDURE — 96411 CHEMO IV PUSH ADDL DRUG: CPT

## 2020-06-29 PROCEDURE — 36591 DRAW BLOOD OFF VENOUS DEVICE: CPT

## 2020-06-29 PROCEDURE — 96377 APPLICATON ON-BODY INJECTOR: CPT

## 2020-06-29 PROCEDURE — 96413 CHEMO IV INFUSION 1 HR: CPT

## 2020-06-29 PROCEDURE — 96367 TX/PROPH/DG ADDL SEQ IV INF: CPT

## 2020-06-29 PROCEDURE — 80053 COMPREHEN METABOLIC PANEL: CPT

## 2020-06-29 PROCEDURE — 6360000002 HC RX W HCPCS: Performed by: INTERNAL MEDICINE

## 2020-06-29 PROCEDURE — 85025 COMPLETE CBC W/AUTO DIFF WBC: CPT

## 2020-06-29 PROCEDURE — 6360000002 HC RX W HCPCS

## 2020-06-29 RX ORDER — DOXORUBICIN HYDROCHLORIDE 2 MG/ML
90 INJECTION, SOLUTION INTRAVENOUS ONCE
Status: DISCONTINUED | OUTPATIENT
Start: 2020-06-29 | End: 2020-06-30 | Stop reason: HOSPADM

## 2020-06-29 RX ORDER — HEPARIN SODIUM (PORCINE) LOCK FLUSH IV SOLN 100 UNIT/ML 100 UNIT/ML
SOLUTION INTRAVENOUS
Status: DISPENSED
Start: 2020-06-29 | End: 2020-06-29

## 2020-06-29 RX ORDER — PALONOSETRON 0.05 MG/ML
0.25 INJECTION, SOLUTION INTRAVENOUS ONCE
Status: DISCONTINUED | OUTPATIENT
Start: 2020-06-29 | End: 2020-06-30 | Stop reason: HOSPADM

## 2020-06-29 RX ORDER — PALONOSETRON 0.05 MG/ML
INJECTION, SOLUTION INTRAVENOUS
Status: DISPENSED
Start: 2020-06-29 | End: 2020-06-29

## 2020-06-30 ENCOUNTER — HOSPITAL ENCOUNTER (OUTPATIENT)
Dept: INFUSION THERAPY | Age: 70
Discharge: HOME | End: 2020-06-30
Payer: MEDICARE

## 2020-06-30 ENCOUNTER — OFFICE VISIT (OUTPATIENT)
Dept: SURGERY | Age: 70
End: 2020-06-30

## 2020-06-30 VITALS
DIASTOLIC BLOOD PRESSURE: 58 MMHG | HEART RATE: 69 BPM | OXYGEN SATURATION: 99 % | SYSTOLIC BLOOD PRESSURE: 135 MMHG | TEMPERATURE: 97.2 F

## 2020-06-30 PROCEDURE — 99024 POSTOP FOLLOW-UP VISIT: CPT | Performed by: SURGERY

## 2020-06-30 PROCEDURE — 99211 OFF/OP EST MAY X REQ PHY/QHP: CPT

## 2020-07-13 ENCOUNTER — HOSPITAL ENCOUNTER (OUTPATIENT)
Dept: INFUSION THERAPY | Age: 70
Discharge: HOME | End: 2020-07-13
Payer: MEDICARE

## 2020-07-13 LAB
ALBUMIN SERPL-MCNC: 4.4 GM/DL (ref 3.4–5)
ALP BLD-CCNC: 83 IU/L (ref 40–129)
ALT SERPL-CCNC: 31 U/L (ref 10–40)
ANION GAP SERPL CALCULATED.3IONS-SCNC: 16 MMOL/L (ref 4–16)
AST SERPL-CCNC: 22 IU/L (ref 15–37)
BASOPHILS ABSOLUTE: 0.1 K/CU MM
BASOPHILS RELATIVE PERCENT: 0.6 % (ref 0–1)
BILIRUB SERPL-MCNC: 0.5 MG/DL (ref 0–1)
BUN BLDV-MCNC: 14 MG/DL (ref 6–23)
CALCIUM SERPL-MCNC: 9.9 MG/DL (ref 8.3–10.6)
CHLORIDE BLD-SCNC: 101 MMOL/L (ref 99–110)
CO2: 24 MMOL/L (ref 21–32)
CREAT SERPL-MCNC: 0.8 MG/DL (ref 0.6–1.1)
DIFFERENTIAL TYPE: ABNORMAL
EOSINOPHILS ABSOLUTE: 0 K/CU MM
EOSINOPHILS RELATIVE PERCENT: 0 % (ref 0–3)
GFR AFRICAN AMERICAN: >60 ML/MIN/1.73M2
GFR NON-AFRICAN AMERICAN: >60 ML/MIN/1.73M2
GLUCOSE BLD-MCNC: 113 MG/DL (ref 70–99)
HCT VFR BLD CALC: 28.4 % (ref 37–47)
HEMOGLOBIN: 9.3 GM/DL (ref 12.5–16)
LYMPHOCYTES ABSOLUTE: 0.5 K/CU MM
LYMPHOCYTES RELATIVE PERCENT: 3.9 % (ref 24–44)
MCH RBC QN AUTO: 31.6 PG (ref 27–31)
MCHC RBC AUTO-ENTMCNC: 32.7 % (ref 32–36)
MCV RBC AUTO: 96.6 FL (ref 78–100)
MONOCYTES ABSOLUTE: 0.8 K/CU MM
MONOCYTES RELATIVE PERCENT: 5.9 % (ref 0–4)
PDW BLD-RTO: 19.9 % (ref 11.7–14.9)
PLATELET # BLD: 207 K/CU MM (ref 140–440)
PMV BLD AUTO: 9.7 FL (ref 7.5–11.1)
POTASSIUM SERPL-SCNC: 4.4 MMOL/L (ref 3.5–5.1)
RBC # BLD: 2.94 M/CU MM (ref 4.2–5.4)
SEGMENTED NEUTROPHILS ABSOLUTE COUNT: 11.8 K/CU MM
SEGMENTED NEUTROPHILS RELATIVE PERCENT: 89.6 % (ref 36–66)
SODIUM BLD-SCNC: 141 MMOL/L (ref 135–145)
TOTAL PROTEIN: 6.5 GM/DL (ref 6.4–8.2)
WBC # BLD: 13.1 K/CU MM (ref 4–10.5)

## 2020-07-13 PROCEDURE — 85025 COMPLETE CBC W/AUTO DIFF WBC: CPT

## 2020-07-13 PROCEDURE — 96413 CHEMO IV INFUSION 1 HR: CPT

## 2020-07-13 PROCEDURE — 80053 COMPREHEN METABOLIC PANEL: CPT

## 2020-07-13 PROCEDURE — 36591 DRAW BLOOD OFF VENOUS DEVICE: CPT

## 2020-07-13 PROCEDURE — 36415 COLL VENOUS BLD VENIPUNCTURE: CPT

## 2020-07-13 PROCEDURE — 2500000003 HC RX 250 WO HCPCS

## 2020-07-13 PROCEDURE — 6360000002 HC RX W HCPCS

## 2020-07-13 PROCEDURE — 6360000002 HC RX W HCPCS: Performed by: INTERNAL MEDICINE

## 2020-07-13 PROCEDURE — 96375 TX/PRO/DX INJ NEW DRUG ADDON: CPT

## 2020-07-13 PROCEDURE — 96367 TX/PROPH/DG ADDL SEQ IV INF: CPT

## 2020-07-13 PROCEDURE — 2580000003 HC RX 258: Performed by: INTERNAL MEDICINE

## 2020-07-13 RX ORDER — DIPHENHYDRAMINE HYDROCHLORIDE 50 MG/ML
INJECTION INTRAMUSCULAR; INTRAVENOUS
Status: DISPENSED
Start: 2020-07-13 | End: 2020-07-13

## 2020-07-13 RX ORDER — DIPHENHYDRAMINE HYDROCHLORIDE 50 MG/ML
25 INJECTION INTRAMUSCULAR; INTRAVENOUS ONCE
Status: DISCONTINUED | OUTPATIENT
Start: 2020-07-13 | End: 2020-07-14 | Stop reason: HOSPADM

## 2020-07-13 RX ORDER — HEPARIN SODIUM (PORCINE) LOCK FLUSH IV SOLN 100 UNIT/ML 100 UNIT/ML
SOLUTION INTRAVENOUS
Status: DISPENSED
Start: 2020-07-13 | End: 2020-07-13

## 2020-07-20 ENCOUNTER — HOSPITAL ENCOUNTER (OUTPATIENT)
Dept: INFUSION THERAPY | Age: 70
Discharge: HOME | End: 2020-07-20
Payer: MEDICARE

## 2020-07-20 LAB
ALBUMIN SERPL-MCNC: 3.8 GM/DL (ref 3.4–5)
ALP BLD-CCNC: 63 IU/L (ref 40–129)
ALT SERPL-CCNC: 37 U/L (ref 10–40)
ANION GAP SERPL CALCULATED.3IONS-SCNC: 13 MMOL/L (ref 4–16)
AST SERPL-CCNC: 25 IU/L (ref 15–37)
BASOPHILS ABSOLUTE: 0.1 K/CU MM
BASOPHILS RELATIVE PERCENT: 3 % (ref 0–1)
BILIRUB SERPL-MCNC: 0.6 MG/DL (ref 0–1)
BUN BLDV-MCNC: 14 MG/DL (ref 6–23)
CALCIUM SERPL-MCNC: 9.7 MG/DL (ref 8.3–10.6)
CHLORIDE BLD-SCNC: 105 MMOL/L (ref 99–110)
CO2: 25 MMOL/L (ref 21–32)
CREAT SERPL-MCNC: 0.8 MG/DL (ref 0.6–1.1)
DIFFERENTIAL TYPE: ABNORMAL
EOSINOPHILS ABSOLUTE: 0.1 K/CU MM
EOSINOPHILS RELATIVE PERCENT: 2.1 % (ref 0–3)
GFR AFRICAN AMERICAN: >60 ML/MIN/1.73M2
GFR NON-AFRICAN AMERICAN: >60 ML/MIN/1.73M2
GLUCOSE BLD-MCNC: 87 MG/DL (ref 70–99)
HCT VFR BLD CALC: 27.1 % (ref 37–47)
HEMOGLOBIN: 8.9 GM/DL (ref 12.5–16)
LYMPHOCYTES ABSOLUTE: 0.6 K/CU MM
LYMPHOCYTES RELATIVE PERCENT: 17.5 % (ref 24–44)
MCH RBC QN AUTO: 31.6 PG (ref 27–31)
MCHC RBC AUTO-ENTMCNC: 32.8 % (ref 32–36)
MCV RBC AUTO: 96.1 FL (ref 78–100)
MONOCYTES ABSOLUTE: 0.4 K/CU MM
MONOCYTES RELATIVE PERCENT: 12.7 % (ref 0–4)
PDW BLD-RTO: 19.5 % (ref 11.7–14.9)
PLATELET # BLD: 304 K/CU MM (ref 140–440)
PMV BLD AUTO: 9.7 FL (ref 7.5–11.1)
POTASSIUM SERPL-SCNC: 4.3 MMOL/L (ref 3.5–5.1)
RBC # BLD: 2.82 M/CU MM (ref 4.2–5.4)
SEGMENTED NEUTROPHILS ABSOLUTE COUNT: 2.2 K/CU MM
SEGMENTED NEUTROPHILS RELATIVE PERCENT: 64.7 % (ref 36–66)
SODIUM BLD-SCNC: 143 MMOL/L (ref 135–145)
TOTAL PROTEIN: 6 GM/DL (ref 6.4–8.2)
WBC # BLD: 3.4 K/CU MM (ref 4–10.5)

## 2020-07-20 PROCEDURE — 85025 COMPLETE CBC W/AUTO DIFF WBC: CPT

## 2020-07-20 PROCEDURE — 36415 COLL VENOUS BLD VENIPUNCTURE: CPT

## 2020-07-20 PROCEDURE — 80053 COMPREHEN METABOLIC PANEL: CPT

## 2020-07-20 PROCEDURE — 96375 TX/PRO/DX INJ NEW DRUG ADDON: CPT

## 2020-07-20 PROCEDURE — 36591 DRAW BLOOD OFF VENOUS DEVICE: CPT

## 2020-07-20 PROCEDURE — 2580000003 HC RX 258: Performed by: INTERNAL MEDICINE

## 2020-07-20 PROCEDURE — 2500000003 HC RX 250 WO HCPCS: Performed by: INTERNAL MEDICINE

## 2020-07-20 PROCEDURE — 6360000002 HC RX W HCPCS: Performed by: INTERNAL MEDICINE

## 2020-07-20 PROCEDURE — 96413 CHEMO IV INFUSION 1 HR: CPT

## 2020-07-20 PROCEDURE — 96367 TX/PROPH/DG ADDL SEQ IV INF: CPT

## 2020-07-20 PROCEDURE — 6360000002 HC RX W HCPCS

## 2020-07-20 RX ORDER — DIPHENHYDRAMINE HYDROCHLORIDE 50 MG/ML
25 INJECTION INTRAMUSCULAR; INTRAVENOUS ONCE
Status: DISCONTINUED | OUTPATIENT
Start: 2020-07-20 | End: 2020-07-21 | Stop reason: HOSPADM

## 2020-07-20 RX ORDER — HEPARIN SODIUM (PORCINE) LOCK FLUSH IV SOLN 100 UNIT/ML 100 UNIT/ML
SOLUTION INTRAVENOUS
Status: DISPENSED
Start: 2020-07-20 | End: 2020-07-20

## 2020-07-21 ENCOUNTER — OFFICE VISIT (OUTPATIENT)
Dept: FAMILY MEDICINE CLINIC | Age: 70
End: 2020-07-21
Payer: MEDICARE

## 2020-07-21 VITALS
DIASTOLIC BLOOD PRESSURE: 70 MMHG | TEMPERATURE: 97.1 F | HEART RATE: 71 BPM | OXYGEN SATURATION: 99 % | HEIGHT: 64 IN | BODY MASS INDEX: 29.64 KG/M2 | WEIGHT: 173.6 LBS | SYSTOLIC BLOOD PRESSURE: 110 MMHG

## 2020-07-21 PROCEDURE — G0438 PPPS, INITIAL VISIT: HCPCS | Performed by: FAMILY MEDICINE

## 2020-07-21 RX ORDER — ATORVASTATIN CALCIUM 10 MG/1
10 TABLET, FILM COATED ORAL DAILY
Qty: 90 TABLET | Refills: 3 | Status: SHIPPED | OUTPATIENT
Start: 2020-07-21 | End: 2021-07-21 | Stop reason: SDUPTHER

## 2020-07-21 RX ORDER — ATENOLOL 100 MG/1
100 TABLET ORAL DAILY
Qty: 90 TABLET | Refills: 3 | Status: SHIPPED | OUTPATIENT
Start: 2020-07-21 | End: 2021-07-21 | Stop reason: SDUPTHER

## 2020-07-21 ASSESSMENT — LIFESTYLE VARIABLES: HOW OFTEN DO YOU HAVE A DRINK CONTAINING ALCOHOL: 0

## 2020-07-21 ASSESSMENT — VISUAL ACUITY
OS_CC: 20/30
OD_CC: 20/360

## 2020-07-21 ASSESSMENT — PATIENT HEALTH QUESTIONNAIRE - PHQ9
SUM OF ALL RESPONSES TO PHQ QUESTIONS 1-9: 0
SUM OF ALL RESPONSES TO PHQ QUESTIONS 1-9: 0

## 2020-07-21 NOTE — PROGRESS NOTES
Medicare Annual Wellness Visit  Name: Maxine Edwards Date: 2020   MRN: [id number] Sex: Female   Age: 78 y.o. Ethnicity: Non-/Non    : 1950 Race: Padmini Farmer is here for Medicare AWV and Hypertension    Screenings for behavioral, psychosocial and functional/safety risks, and cognitive dysfunction are all negative except as indicated below. These results, as well as other patient data from the [address] form, are documented in Flowsheets linked to this Encounter. Allergies   Allergen Reactions    Codeine Hives     \"tylenol with codeine is what I had trouble with\"          Prior to Visit Medications    Medication Sig Taking? Authorizing Provider   Calcium Carb-Cholecalciferol (CALTRATE 600+D) 600-800 MG-UNIT TABS 1 tab twice per day Yes Deisi Singh MD   atenolol (TENORMIN) 100 MG tablet Take 1 tablet by mouth daily Yes Deisi Singh MD   atorvastatin (LIPITOR) 10 MG tablet Take 1 tablet by mouth daily Yes Deisi Singh MD   triamcinolone (KENALOG) 0.1 % cream  Yes CHARI Rodrigez CNP   losartan (COZAAR) 50 MG tablet Take 1 tablet by mouth daily Yes Celeste Russo MD   aspirin 81 MG tablet Take 81 mg by mouth daily. Yes Historical Provider, MD   Multiple Vitamin (MULTIVITAMIN PO) Take  by mouth daily.    Yes Historical Provider, MD   ketorolac (TORADOL) 10 MG tablet Take 1 tablet by mouth every 6 hours as needed for Pain  Patient not taking: Reported on 2020  CHARI Suarez CNP   nitroGLYCERIN (NITROSTAT) 0.3 MG SL tablet Place 1 tablet under the tongue every 5 minutes as needed for Chest pain  Patient not taking: Reported on 2020  Deisi Singh MD   clobetasol (OLUX) 0.05 % foam   Mohamud Mays   ketoconazole (NIZORAL) 2 % shampoo   Mohamud Mays         Past Medical History:   Diagnosis Date    Cancer [facility])     left breast cancer\"found after bx 2020\" following with Dr Trista Rios Colonic polyp     Hx of motion from Last 3 Encounters:   07/21/20 173 lb 9.6 oz (78.7 kg)   06/18/20 173 lb 3.2 oz (78.6 kg)   06/13/20 174 lb 12.8 oz (79.3 kg)     Vitals:    07/21/20 0822   BP: 110/70   Site: Left Upper Arm   Position: Sitting   Cuff Size: Medium Adult   Pulse: 71   Temp: 97.1 °F (36.2 °C)   TempSrc: Temporal   SpO2: 99%   Weight: 173 lb 9.6 oz (78.7 kg)   Height: 5' 4\" (1.626 m)     Body mass index is 29.8 kg/m². Based upon direct observation of the patient, evaluation of cognition reveals recent and remote memory intact. ENT: bilateral external ear normal  Pulmonary/Chest: clear to auscultation bilaterally- no wheezes, rales or rhonchi, normal air movement, no respiratory distress  Cardiovascular: normal rate, normal S1 and S2 and no gallops    Patient's complete Health Risk Assessment and screening values have been reviewed and are found in Flowsheets. The following problems were reviewed today and where indicated follow up appointments were made and/or referrals ordered. Positive Risk Factor Screenings with Interventions:     Health Habits/Nutrition:  Health Habits/Nutrition  Do you exercise for at least 20 minutes 2-3 times per week?: Yes  Have you lost any weight without trying in the past 3 months?: No  Do you eat fewer than 2 meals per day?: No  Have you seen a dentist within the past year?: (!) No  Body mass index is 29.8 kg/m².   Health Habits/Nutrition Interventions:  · Dental exam overdue:  patient encouraged to make appointment with his/her dentist    Hearing/Vision:   Hearing Screening    125Hz 250Hz 500Hz 1000Hz 2000Hz 3000Hz 4000Hz 6000Hz 8000Hz   Right ear:            Left ear:               Visual Acuity Screening    Right eye Left eye Both eyes   Without correction:      With correction: 20/360 20/30 20/30     Hearing/Vision  Do you or your family notice any trouble with your hearing?: (!) Yes  Do you have difficulty driving, watching TV, or doing any of your daily activities because of your eyesight?: No  Have you had an eye exam within the past year?: (!) No  Hearing/Vision Interventions:  · Hearing concerns:  told patient we could wait since it's not bothering her and she is currently being treated for breast cancer    Personalized Preventive Plan   Current Health Maintenance Status  Immunization History   Administered Date(s) Administered    Influenza 09/25/2012    Influenza A ([id number]) Vaccine PF IM 01/15/2010    Influenza Virus Vaccine 11/15/2011, 10/02/2014, 11/06/2015    Influenza, High Dose (Fluzone 65 yrs and older) 12/13/2016, 10/24/2017, 10/08/2018    Influenza, Delbra Mare, IM, (6 mo and older Fluzone, Flulaval, Fluarix and 3 yrs and older Afluria) 10/02/2013    Influenza, Triv, inactivated, subunit, adjuvanted, IM (Fluad 65 yrs and older) 09/25/2019    Pneumococcal Conjugate 13-valent ([id number]) 06/21/2016    Pneumococcal Polysaccharide ([id number]) 12/29/2014    Tdap (Boostrix, Adacel) 05/26/2009, 08/07/2019    Zoster Live (Zostavax) 05/13/2013    Zoster Recombinant (Shingrix) 09/19/2019, 11/22/2019        Health Maintenance   Topic Date Due    Colon cancer screen colonoscopy  02/27/2020    Annual Wellness Visit (AWV)  03/20/2020    Flu vaccine (1) 09/01/2020    Lipid screen  01/29/2021    Breast cancer screen  03/20/2021    Potassium monitoring  07/20/2021    Creatinine monitoring  07/20/2021    DTaP/Tdap/Td vaccine (3 - Td) 08/07/2029    DEXA (modify frequency per FRAX score)  Completed    Pneumococcal 65+ years Vaccine  Completed    Hepatitis C screen  Completed    Hepatitis A vaccine  Aged Out    Hepatitis B vaccine  Aged Out    Hib vaccine  Aged Out    Meningococcal (ACWY) vaccine  Aged Out     Recommendations for [employer] Due: see orders and patient instructions/AVS.  .   Recommended screening schedule for the next 5-10 years is provided to the patient in written form: see Patient Barbara Ortiz was seen today for medicare awv and hypertension. Diagnoses and all orders for this visit:    Routine general medical examination at a health care facility    Essential hypertension  -     atenolol (TENORMIN) 100 MG tablet; Take 1 tablet by mouth daily    Hyperlipidemia, unspecified hyperlipidemia type  -     atorvastatin (LIPITOR) 10 MG tablet; Take 1 tablet by mouth daily    Malignant neoplasm of upper-outer quadrant of left breast in female, estrogen receptor negative ([facility] [address]    Coronary artery disease involving native coronary artery of native heart without angina pectoris    Osteopenia of multiple sites  -     DEXA Bone Density Axial Skeleton; Future  -     Calcium Carb-Cholecalciferol (CALTRATE 600+D) 600-800 MG-UNIT TABS; 1 tab twice per day          HTN stable. Continue current medication    Colonoscopy when she is feeling better.

## 2020-07-21 NOTE — PATIENT INSTRUCTIONS
after-school treat. ? Dip raw vegetables in hummus or peanut butter. ? Keep dried fruit on hand for an easy \"take with you\" snack. · Make something sweet--and healthy. ? Try baked apples or pears topped with cinnamon and honey for a delicious dessert. ? Make chocolate chip cookies even better with grated carrots added to the mix. Where can you learn more? Go to https://VhotopeOsito.K[employer]ik Media. org and sign in to your Poetica account. Enter F050 in the eBuddy box to learn more about \"Learning About Eating More Fruits and Vegetables. \"     If you do not have an account, please click on the \"Sign Up Now\" link. Current as of: November 7, 2018  Content Version: 12.0  © 6474-8342 Healthwise, Incorporated. Care instructions adapted under license by [facility] ([facility]). If you have questions about a medical condition or this instruction, always ask your healthcare professional. [place] [address] any warranty or liability for your use of this information. October Fifth is the DEADLINE for voter registration for the November Third GENERAL ELECTION. Don't forget to vote!!!        [address] TO ALL APPOINTMENTS    The diagnoses and medications listed in this after visit summary may not be accurate at the time of check out. Please check MY CHART in 28-48 hours for possible corrections. Late cancellation policy: So that we can better accommodate people who are sick, please give our office 24 hour notice for an appointment cancellation. Thank you. Missed appointments: Your care is very important to us. It is important that you keep your scheduled appointments. Multiple missed appointments will lead to a dismissal from the office. Later arrival policy: If you are more than 10 minutes late for your appointment, you will be asked to reschedule. Please allow 5-7 business days for paperwork to be processed.      It is important that you check your MY Chart messages, as they include appointment reminders, test results, and other important information. If you have forgotten your password, please call [phone]. Personalized Preventive Plan for Tamim Dc - 7/21/2020  Medicare offers a range of preventive health benefits. Some of the tests and screenings are paid in full while other may be subject to a deductible, co-insurance, and/or copay. Some of these benefits include a comprehensive review of your medical history including lifestyle, illnesses that may run in your family, and various assessments and screenings as appropriate. After reviewing your medical record and screening and assessments performed today your provider may have ordered immunizations, labs, imaging, and/or referrals for you. A list of these orders (if applicable) as well as your Preventive Care list are included within your After Visit Summary for your review. Other Preventive Recommendations:    · A preventive eye exam performed by an eye specialist is recommended every 1-2 years to screen for glaucoma; cataracts, macular degeneration, and other eye disorders. · A preventive dental visit is recommended every 6 months. · Try to get at least 150 minutes of exercise per week or 10,000 steps per day on a pedometer . · Order or download the FREE \"Exercise & Physical Activity: Your Everyday Guide\" from [employer]. Call 1-511.140.6004 or search [employer] online. · You need 4298-8107 mg of calcium and 8438-1824 IU of vitamin D per day. It is possible to meet your calcium requirement with diet alone, but a vitamin D supplement is usually necessary to meet this goal.  · When exposed to the sun, use a sunscreen that protects against both UVA and UVB radiation with an SPF of 30 or greater. Reapply every 2 to 3 hours or after sweating, drying off with a towel, or swimming. Always wear a seat belt when traveling in a car.  Always wear a helmet when riding a bicycle or motorcycle. Walking for Exercise: Care Instructions  Your Care Instructions    Walking is one of the easiest ways to get the exercise you need for good health. A brisk, 30-minute walk each day can help you feel better and have more energy. It can help you lower your risk of disease. Walking can help you keep your bones strong and your heart healthy. Check with your doctor before you start a walking plan if you have heart problems, other health issues, or you have not been active in a long time. Follow your doctor's instructions for safe levels of exercise. Follow-up care is a key part of your treatment and safety. Be sure to make and go to all appointments, and call your doctor if you are having problems. It's also a good idea to know your test results and keep a list of the medicines you take. How can you care for yourself at home? Getting started  Start slowly and set a short-term goal. For example, walk for 5 or 10 minutes every day. Bit by bit, increase the amount you walk every day. Try for at least 30 minutes on most days of the week. You also may want to swim, bike, or do other activities. If finding enough time is a problem, it is fine to be active in blocks of 10 minutes or more throughout your day and week. To get the heart-healthy benefits of walking, you need to walk briskly enough to increase your heart rate and breathing, but not so fast that you cannot talk comfortably. Wear comfortable shoes that fit well and provide good support for your feet and ankles. Staying with your plan  After you've made walking a habit, set a longer-term goal. You may want to set a goal of walking briskly for longer or walking farther. Experts say to do 2½ hours of moderate activity a week. A faster heartbeat is what defines moderate-level activity. To stay motivated, walk with friends, coworkers, or pets. Use a phone barbie or pedometer to track your steps each day.  Set a goal to increase your steps. Once you get there, set a higher goal. Aim for 10,000 steps a day. If the weather keeps you from walking outside, go for walks at the mall with a friend. Local schools and churches may have indoor gyms where you can walk. Fitting a walk into your workday  Park several blocks away from work, or get off the bus a few stops early. Use the stairs instead of the elevator, at least for a few floors. Suggest holding meetings with colleagues during a walk inside or outside the building. Use the restroom that is the farthest from your desk or workstation. Use your morning and afternoon breaks to take quick 15-minute walks. Staying safe  Know your surroundings. Walk in a well-lighted, safe place. If it is dark, walk with a partner. Wear light-colored clothing. If you can, buy a vest or jacket that reflects light. Carry a cell phone for emergencies. Drink plenty of water. Take a water bottle with you when you walk. This is very important if it is hot out. Be careful not to slip on wet or icy ground. You can buy \"grippers\" for your shoes to help keep you from slipping. Pay attention to your walking surface. Use sidewalks and paths. If you have breathing problems like asthma or COPD, ask your doctor when it is safe for you to walk outdoors. Cold, dry air, smog, pollen, or other things in the air could cause breathing problems. Where can you learn more? Go to https://ModoPaymentsprema.healthCaremerge. org and sign in to your BeOnDesk account. Enter R159 in the DotBlu box to learn more about \"Walking for Exercise: Care Instructions. \"     If you do not have an account, please click on the \"Sign Up Now\" link. Current as of: August 19, 2018  Content Version: 12.0  © 6251-4262 Healthwise, Incorporated. Care instructions adapted under license by [facility] ([facility]).  If you have questions about a medical condition or this instruction, always ask your healthcare professional. [employer] [place] disclaims any warranty or liability for your use of this information.   ·    ·

## 2020-07-22 ENCOUNTER — HOSPITAL ENCOUNTER (OUTPATIENT)
Dept: INFUSION THERAPY | Age: 70
Discharge: HOME | End: 2020-07-22
Payer: MEDICARE

## 2020-07-23 PROBLEM — L63.0 ALOPECIA (CAPITIS) TOTALIS: Status: ACTIVE | Noted: 2020-07-23

## 2020-07-24 RX ORDER — DIPHENHYDRAMINE HYDROCHLORIDE 50 MG/ML
25 INJECTION INTRAMUSCULAR; INTRAVENOUS ONCE
Status: CANCELLED | OUTPATIENT
Start: 2020-07-27

## 2020-07-27 ENCOUNTER — HOSPITAL ENCOUNTER (OUTPATIENT)
Dept: INFUSION THERAPY | Age: 70
Discharge: HOME | End: 2020-07-27
Payer: MEDICARE

## 2020-07-27 LAB
ALBUMIN SERPL-MCNC: 4.1 GM/DL (ref 3.4–5)
ALP BLD-CCNC: 57 IU/L (ref 40–128)
ALT SERPL-CCNC: 46 U/L (ref 10–40)
ANION GAP SERPL CALCULATED.3IONS-SCNC: 11 MMOL/L (ref 4–16)
AST SERPL-CCNC: 30 IU/L (ref 15–37)
BASOPHILS ABSOLUTE: 0.1 K/CU MM
BASOPHILS RELATIVE PERCENT: 4 % (ref 0–1)
BILIRUB SERPL-MCNC: 0.7 MG/DL (ref 0–1)
BUN BLDV-MCNC: 14 MG/DL (ref 6–23)
CALCIUM SERPL-MCNC: 9.5 MG/DL (ref 8.3–10.6)
CHLORIDE BLD-SCNC: 104 MMOL/L (ref 99–110)
CO2: 25 MMOL/L (ref 21–32)
CREAT SERPL-MCNC: 0.7 MG/DL (ref 0.6–1.1)
DIFFERENTIAL TYPE: ABNORMAL
EOSINOPHILS ABSOLUTE: 0.1 K/CU MM
EOSINOPHILS RELATIVE PERCENT: 4 % (ref 0–3)
GFR AFRICAN AMERICAN: >60 ML/MIN/1.73M2
GFR NON-AFRICAN AMERICAN: >60 ML/MIN/1.73M2
GLUCOSE BLD-MCNC: 85 MG/DL (ref 70–99)
HCT VFR BLD CALC: 27.1 % (ref 37–47)
HEMOGLOBIN: 9.1 GM/DL (ref 12.5–16)
LYMPHOCYTES ABSOLUTE: 0.7 K/CU MM
LYMPHOCYTES RELATIVE PERCENT: 25.7 % (ref 24–44)
MCH RBC QN AUTO: 32.6 PG (ref 27–31)
MCHC RBC AUTO-ENTMCNC: 33.6 % (ref 32–36)
MCV RBC AUTO: 97.1 FL (ref 78–100)
MONOCYTES ABSOLUTE: 0.4 K/CU MM
MONOCYTES RELATIVE PERCENT: 14 % (ref 0–4)
PDW BLD-RTO: 20.5 % (ref 11.7–14.9)
PLATELET # BLD: 292 K/CU MM (ref 140–440)
PMV BLD AUTO: 9.6 FL (ref 7.5–11.1)
POTASSIUM SERPL-SCNC: 4.5 MMOL/L (ref 3.5–5.1)
RBC # BLD: 2.79 M/CU MM (ref 4.2–5.4)
SEGMENTED NEUTROPHILS ABSOLUTE COUNT: 1.4 K/CU MM
SEGMENTED NEUTROPHILS RELATIVE PERCENT: 52.3 % (ref 36–66)
SODIUM BLD-SCNC: 140 MMOL/L (ref 135–145)
TOTAL PROTEIN: 6 GM/DL (ref 6.4–8.2)
WBC # BLD: 2.7 K/CU MM (ref 4–10.5)

## 2020-07-27 PROCEDURE — 85025 COMPLETE CBC W/AUTO DIFF WBC: CPT

## 2020-07-27 PROCEDURE — 6360000002 HC RX W HCPCS: Performed by: INTERNAL MEDICINE

## 2020-07-27 PROCEDURE — 36591 DRAW BLOOD OFF VENOUS DEVICE: CPT

## 2020-07-27 PROCEDURE — 96375 TX/PRO/DX INJ NEW DRUG ADDON: CPT

## 2020-07-27 PROCEDURE — 6360000002 HC RX W HCPCS

## 2020-07-27 PROCEDURE — 96413 CHEMO IV INFUSION 1 HR: CPT

## 2020-07-27 PROCEDURE — 2500000003 HC RX 250 WO HCPCS

## 2020-07-27 PROCEDURE — 80053 COMPREHEN METABOLIC PANEL: CPT

## 2020-07-27 PROCEDURE — 2580000003 HC RX 258: Performed by: INTERNAL MEDICINE

## 2020-07-27 PROCEDURE — 36415 COLL VENOUS BLD VENIPUNCTURE: CPT

## 2020-07-27 RX ORDER — HEPARIN SODIUM (PORCINE) LOCK FLUSH IV SOLN 100 UNIT/ML 100 UNIT/ML
SOLUTION INTRAVENOUS
Status: DISCONTINUED
Start: 2020-07-27 | End: 2020-07-28 | Stop reason: HOSPADM

## 2020-07-30 ENCOUNTER — HOSPITAL ENCOUNTER (OUTPATIENT)
Dept: WOMENS IMAGING | Age: 70
Discharge: HOME | End: 2020-07-30
Payer: MEDICARE

## 2020-07-30 PROCEDURE — 77080 DXA BONE DENSITY AXIAL: CPT

## 2020-07-31 RX ORDER — MEPERIDINE HYDROCHLORIDE 50 MG/ML
12.5 INJECTION INTRAMUSCULAR; INTRAVENOUS; SUBCUTANEOUS ONCE
Status: CANCELLED | OUTPATIENT
Start: 2020-08-03

## 2020-07-31 RX ORDER — EPINEPHRINE 1 MG/ML
0.3 INJECTION, SOLUTION, CONCENTRATE INTRAVENOUS PRN
Status: CANCELLED | OUTPATIENT
Start: 2020-08-03

## 2020-07-31 RX ORDER — DIPHENHYDRAMINE HYDROCHLORIDE 50 MG/ML
25 INJECTION INTRAMUSCULAR; INTRAVENOUS ONCE
Status: CANCELLED | OUTPATIENT
Start: 2020-08-03

## 2020-07-31 RX ORDER — SODIUM CHLORIDE 9 MG/ML
20 INJECTION, SOLUTION INTRAVENOUS ONCE
Status: CANCELLED | OUTPATIENT
Start: 2020-08-03

## 2020-07-31 RX ORDER — HEPARIN SODIUM (PORCINE) LOCK FLUSH IV SOLN 100 UNIT/ML 100 UNIT/ML
500 SOLUTION INTRAVENOUS PRN
Status: CANCELLED | OUTPATIENT
Start: 2020-08-03

## 2020-07-31 RX ORDER — DIPHENHYDRAMINE HYDROCHLORIDE 50 MG/ML
50 INJECTION INTRAMUSCULAR; INTRAVENOUS ONCE
Status: CANCELLED | OUTPATIENT
Start: 2020-08-03

## 2020-07-31 RX ORDER — METHYLPREDNISOLONE SODIUM SUCCINATE 125 MG/2ML
125 INJECTION, POWDER, LYOPHILIZED, FOR SOLUTION INTRAMUSCULAR; INTRAVENOUS ONCE
Status: CANCELLED | OUTPATIENT
Start: 2020-08-03

## 2020-07-31 RX ORDER — SODIUM CHLORIDE 9 MG/ML
INJECTION, SOLUTION INTRAVENOUS CONTINUOUS
Status: CANCELLED | OUTPATIENT
Start: 2020-08-03

## 2020-07-31 RX ORDER — SODIUM CHLORIDE 0.9 % (FLUSH) 0.9 %
5 SYRINGE (ML) INJECTION PRN
Status: CANCELLED | OUTPATIENT
Start: 2020-08-03

## 2020-07-31 RX ORDER — SODIUM CHLORIDE 0.9 % (FLUSH) 0.9 %
10 SYRINGE (ML) INJECTION PRN
Status: CANCELLED | OUTPATIENT
Start: 2020-08-03

## 2020-08-03 ENCOUNTER — HOSPITAL ENCOUNTER (OUTPATIENT)
Dept: INFUSION THERAPY | Age: 70
Discharge: HOME | End: 2020-08-03
Payer: MEDICARE

## 2020-08-03 VITALS
RESPIRATION RATE: 18 BRPM | SYSTOLIC BLOOD PRESSURE: 156 MMHG | TEMPERATURE: 97.6 F | OXYGEN SATURATION: 98 % | WEIGHT: 173.4 LBS | HEART RATE: 76 BPM | HEIGHT: 64 IN | BODY MASS INDEX: 29.6 KG/M2 | DIASTOLIC BLOOD PRESSURE: 67 MMHG

## 2020-08-03 DIAGNOSIS — Z17.1 MALIGNANT NEOPLASM OF UPPER-OUTER QUADRANT OF LEFT BREAST IN FEMALE, ESTROGEN RECEPTOR NEGATIVE (HCC): Primary | ICD-10-CM

## 2020-08-03 DIAGNOSIS — C50.412 MALIGNANT NEOPLASM OF UPPER-OUTER QUADRANT OF LEFT BREAST IN FEMALE, ESTROGEN RECEPTOR NEGATIVE (HCC): Primary | ICD-10-CM

## 2020-08-03 LAB
ALBUMIN SERPL-MCNC: 4.2 GM/DL (ref 3.4–5)
ALP BLD-CCNC: 52 IU/L (ref 40–128)
ALT SERPL-CCNC: 60 U/L (ref 10–40)
ANION GAP SERPL CALCULATED.3IONS-SCNC: 12 MMOL/L (ref 4–16)
AST SERPL-CCNC: 34 IU/L (ref 15–37)
BASOPHILS ABSOLUTE: 0.1 K/CU MM
BASOPHILS RELATIVE PERCENT: 3 % (ref 0–1)
BILIRUB SERPL-MCNC: 0.9 MG/DL (ref 0–1)
BUN BLDV-MCNC: 15 MG/DL (ref 6–23)
CALCIUM SERPL-MCNC: 10 MG/DL (ref 8.3–10.6)
CHLORIDE BLD-SCNC: 101 MMOL/L (ref 99–110)
CO2: 26 MMOL/L (ref 21–32)
CREAT SERPL-MCNC: 0.7 MG/DL (ref 0.6–1.1)
DIFFERENTIAL TYPE: ABNORMAL
EOSINOPHILS ABSOLUTE: 0.2 K/CU MM
EOSINOPHILS RELATIVE PERCENT: 5.5 % (ref 0–3)
GFR AFRICAN AMERICAN: >60 ML/MIN/1.73M2
GFR NON-AFRICAN AMERICAN: >60 ML/MIN/1.73M2
GLUCOSE BLD-MCNC: 86 MG/DL (ref 70–99)
HCT VFR BLD CALC: 28.5 % (ref 37–47)
HEMOGLOBIN: 9.5 GM/DL (ref 12.5–16)
LYMPHOCYTES ABSOLUTE: 0.7 K/CU MM
LYMPHOCYTES RELATIVE PERCENT: 19.1 % (ref 24–44)
MCH RBC QN AUTO: 33 PG (ref 27–31)
MCHC RBC AUTO-ENTMCNC: 33.3 % (ref 32–36)
MCV RBC AUTO: 99 FL (ref 78–100)
MONOCYTES ABSOLUTE: 0.4 K/CU MM
MONOCYTES RELATIVE PERCENT: 10.1 % (ref 0–4)
PDW BLD-RTO: 20.5 % (ref 11.7–14.9)
PLATELET # BLD: 332 K/CU MM (ref 140–440)
PMV BLD AUTO: 10.1 FL (ref 7.5–11.1)
POTASSIUM SERPL-SCNC: 4.4 MMOL/L (ref 3.5–5.1)
RBC # BLD: 2.88 M/CU MM (ref 4.2–5.4)
SEGMENTED NEUTROPHILS ABSOLUTE COUNT: 2.3 K/CU MM
SEGMENTED NEUTROPHILS RELATIVE PERCENT: 62.3 % (ref 36–66)
SODIUM BLD-SCNC: 139 MMOL/L (ref 135–145)
TOTAL PROTEIN: 6 GM/DL (ref 6.4–8.2)
WBC # BLD: 3.7 K/CU MM (ref 4–10.5)

## 2020-08-03 PROCEDURE — 80053 COMPREHEN METABOLIC PANEL: CPT

## 2020-08-03 PROCEDURE — 36415 COLL VENOUS BLD VENIPUNCTURE: CPT

## 2020-08-03 PROCEDURE — 85025 COMPLETE CBC W/AUTO DIFF WBC: CPT

## 2020-08-03 PROCEDURE — 6360000002 HC RX W HCPCS: Performed by: INTERNAL MEDICINE

## 2020-08-03 PROCEDURE — 2580000003 HC RX 258: Performed by: INTERNAL MEDICINE

## 2020-08-03 PROCEDURE — 96374 THER/PROPH/DIAG INJ IV PUSH: CPT

## 2020-08-03 PROCEDURE — 99211 OFF/OP EST MAY X REQ PHY/QHP: CPT

## 2020-08-03 PROCEDURE — 2500000003 HC RX 250 WO HCPCS

## 2020-08-03 PROCEDURE — 96413 CHEMO IV INFUSION 1 HR: CPT

## 2020-08-03 PROCEDURE — 36591 DRAW BLOOD OFF VENOUS DEVICE: CPT

## 2020-08-03 PROCEDURE — 96375 TX/PRO/DX INJ NEW DRUG ADDON: CPT

## 2020-08-03 PROCEDURE — 6360000002 HC RX W HCPCS

## 2020-08-03 RX ORDER — DIPHENHYDRAMINE HYDROCHLORIDE 50 MG/ML
INJECTION INTRAMUSCULAR; INTRAVENOUS
Status: DISPENSED
Start: 2020-08-03 | End: 2020-08-03

## 2020-08-03 RX ORDER — HEPARIN SODIUM (PORCINE) LOCK FLUSH IV SOLN 100 UNIT/ML 100 UNIT/ML
500 SOLUTION INTRAVENOUS PRN
Status: DISCONTINUED | OUTPATIENT
Start: 2020-08-03 | End: 2020-08-04 | Stop reason: HOSPADM

## 2020-08-03 RX ORDER — SODIUM CHLORIDE 9 MG/ML
20 INJECTION, SOLUTION INTRAVENOUS ONCE
Status: DISCONTINUED | OUTPATIENT
Start: 2020-08-03 | End: 2020-08-04 | Stop reason: HOSPADM

## 2020-08-03 RX ORDER — SODIUM CHLORIDE 0.9 % (FLUSH) 0.9 %
5 SYRINGE (ML) INJECTION PRN
Status: DISCONTINUED | OUTPATIENT
Start: 2020-08-03 | End: 2020-08-04 | Stop reason: HOSPADM

## 2020-08-03 RX ORDER — DIPHENHYDRAMINE HYDROCHLORIDE 50 MG/ML
25 INJECTION INTRAMUSCULAR; INTRAVENOUS ONCE
Status: COMPLETED | OUTPATIENT
Start: 2020-08-03 | End: 2020-08-03

## 2020-08-03 RX ORDER — HEPARIN SODIUM (PORCINE) LOCK FLUSH IV SOLN 100 UNIT/ML 100 UNIT/ML
SOLUTION INTRAVENOUS
Status: COMPLETED
Start: 2020-08-03 | End: 2020-08-03

## 2020-08-03 RX ADMIN — DEXAMETHASONE SODIUM PHOSPHATE: 10 INJECTION INTRAMUSCULAR; INTRAVENOUS at 11:03

## 2020-08-03 RX ADMIN — Medication: at 12:58

## 2020-08-03 RX ADMIN — PACLITAXEL 150 MG: 6 INJECTION, SOLUTION INTRAVENOUS at 11:56

## 2020-08-03 RX ADMIN — SODIUM CHLORIDE 20 ML/HR: 9 INJECTION, SOLUTION INTRAVENOUS at 09:48

## 2020-08-03 RX ADMIN — DIPHENHYDRAMINE HYDROCHLORIDE 25 MG: 50 INJECTION INTRAMUSCULAR; INTRAVENOUS at 11:02

## 2020-08-07 RX ORDER — SODIUM CHLORIDE 9 MG/ML
INJECTION, SOLUTION INTRAVENOUS CONTINUOUS
Status: CANCELLED | OUTPATIENT
Start: 2020-08-31

## 2020-08-07 RX ORDER — HEPARIN SODIUM (PORCINE) LOCK FLUSH IV SOLN 100 UNIT/ML 100 UNIT/ML
500 SOLUTION INTRAVENOUS PRN
Status: CANCELLED | OUTPATIENT
Start: 2020-08-31

## 2020-08-07 RX ORDER — SODIUM CHLORIDE 0.9 % (FLUSH) 0.9 %
5 SYRINGE (ML) INJECTION PRN
Status: CANCELLED | OUTPATIENT
Start: 2020-08-10

## 2020-08-07 RX ORDER — METHYLPREDNISOLONE SODIUM SUCCINATE 125 MG/2ML
125 INJECTION, POWDER, LYOPHILIZED, FOR SOLUTION INTRAMUSCULAR; INTRAVENOUS ONCE
Status: CANCELLED | OUTPATIENT
Start: 2020-08-24

## 2020-08-07 RX ORDER — METHYLPREDNISOLONE SODIUM SUCCINATE 125 MG/2ML
125 INJECTION, POWDER, LYOPHILIZED, FOR SOLUTION INTRAMUSCULAR; INTRAVENOUS ONCE
Status: CANCELLED | OUTPATIENT
Start: 2020-08-10

## 2020-08-07 RX ORDER — DIPHENHYDRAMINE HYDROCHLORIDE 50 MG/ML
50 INJECTION INTRAMUSCULAR; INTRAVENOUS ONCE
Status: CANCELLED | OUTPATIENT
Start: 2020-09-21

## 2020-08-07 RX ORDER — MEPERIDINE HYDROCHLORIDE 50 MG/ML
12.5 INJECTION INTRAMUSCULAR; INTRAVENOUS; SUBCUTANEOUS ONCE
Status: CANCELLED | OUTPATIENT
Start: 2020-08-10

## 2020-08-07 RX ORDER — SODIUM CHLORIDE 9 MG/ML
20 INJECTION, SOLUTION INTRAVENOUS ONCE
Status: CANCELLED | OUTPATIENT
Start: 2020-08-24

## 2020-08-07 RX ORDER — SODIUM CHLORIDE 0.9 % (FLUSH) 0.9 %
10 SYRINGE (ML) INJECTION PRN
Status: CANCELLED | OUTPATIENT
Start: 2020-08-24

## 2020-08-07 RX ORDER — HEPARIN SODIUM (PORCINE) LOCK FLUSH IV SOLN 100 UNIT/ML 100 UNIT/ML
500 SOLUTION INTRAVENOUS PRN
Status: CANCELLED | OUTPATIENT
Start: 2020-09-21

## 2020-08-07 RX ORDER — MEPERIDINE HYDROCHLORIDE 50 MG/ML
12.5 INJECTION INTRAMUSCULAR; INTRAVENOUS; SUBCUTANEOUS ONCE
Status: CANCELLED | OUTPATIENT
Start: 2020-09-21

## 2020-08-07 RX ORDER — SODIUM CHLORIDE 0.9 % (FLUSH) 0.9 %
10 SYRINGE (ML) INJECTION PRN
Status: CANCELLED | OUTPATIENT
Start: 2020-09-21

## 2020-08-07 RX ORDER — SODIUM CHLORIDE 0.9 % (FLUSH) 0.9 %
5 SYRINGE (ML) INJECTION PRN
Status: CANCELLED | OUTPATIENT
Start: 2020-09-21

## 2020-08-07 RX ORDER — DIPHENHYDRAMINE HYDROCHLORIDE 50 MG/ML
25 INJECTION INTRAMUSCULAR; INTRAVENOUS ONCE
Status: CANCELLED | OUTPATIENT
Start: 2020-08-24

## 2020-08-07 RX ORDER — DIPHENHYDRAMINE HYDROCHLORIDE 50 MG/ML
50 INJECTION INTRAMUSCULAR; INTRAVENOUS ONCE
Status: CANCELLED | OUTPATIENT
Start: 2020-08-24

## 2020-08-07 RX ORDER — HEPARIN SODIUM (PORCINE) LOCK FLUSH IV SOLN 100 UNIT/ML 100 UNIT/ML
500 SOLUTION INTRAVENOUS PRN
Status: CANCELLED | OUTPATIENT
Start: 2020-08-10

## 2020-08-07 RX ORDER — EPINEPHRINE 1 MG/ML
0.3 INJECTION, SOLUTION, CONCENTRATE INTRAVENOUS PRN
Status: CANCELLED | OUTPATIENT
Start: 2020-08-24

## 2020-08-07 RX ORDER — EPINEPHRINE 1 MG/ML
0.3 INJECTION, SOLUTION, CONCENTRATE INTRAVENOUS PRN
Status: CANCELLED | OUTPATIENT
Start: 2020-08-10

## 2020-08-07 RX ORDER — SODIUM CHLORIDE 0.9 % (FLUSH) 0.9 %
10 SYRINGE (ML) INJECTION PRN
Status: CANCELLED | OUTPATIENT
Start: 2020-08-31

## 2020-08-07 RX ORDER — DIPHENHYDRAMINE HYDROCHLORIDE 50 MG/ML
25 INJECTION INTRAMUSCULAR; INTRAVENOUS ONCE
Status: CANCELLED | OUTPATIENT
Start: 2020-09-21

## 2020-08-07 RX ORDER — SODIUM CHLORIDE 9 MG/ML
20 INJECTION, SOLUTION INTRAVENOUS ONCE
Status: CANCELLED | OUTPATIENT
Start: 2020-08-31

## 2020-08-07 RX ORDER — MEPERIDINE HYDROCHLORIDE 50 MG/ML
12.5 INJECTION INTRAMUSCULAR; INTRAVENOUS; SUBCUTANEOUS ONCE
Status: CANCELLED | OUTPATIENT
Start: 2020-08-31

## 2020-08-07 RX ORDER — SODIUM CHLORIDE 9 MG/ML
INJECTION, SOLUTION INTRAVENOUS CONTINUOUS
Status: CANCELLED | OUTPATIENT
Start: 2020-08-24

## 2020-08-07 RX ORDER — METHYLPREDNISOLONE SODIUM SUCCINATE 125 MG/2ML
125 INJECTION, POWDER, LYOPHILIZED, FOR SOLUTION INTRAMUSCULAR; INTRAVENOUS ONCE
Status: CANCELLED | OUTPATIENT
Start: 2020-08-31

## 2020-08-07 RX ORDER — DIPHENHYDRAMINE HYDROCHLORIDE 50 MG/ML
50 INJECTION INTRAMUSCULAR; INTRAVENOUS ONCE
Status: CANCELLED | OUTPATIENT
Start: 2020-08-31

## 2020-08-07 RX ORDER — SODIUM CHLORIDE 0.9 % (FLUSH) 0.9 %
5 SYRINGE (ML) INJECTION PRN
Status: CANCELLED | OUTPATIENT
Start: 2020-08-31

## 2020-08-07 RX ORDER — SODIUM CHLORIDE 0.9 % (FLUSH) 0.9 %
5 SYRINGE (ML) INJECTION PRN
Status: CANCELLED | OUTPATIENT
Start: 2020-08-24

## 2020-08-07 RX ORDER — SODIUM CHLORIDE 9 MG/ML
INJECTION, SOLUTION INTRAVENOUS CONTINUOUS
Status: CANCELLED | OUTPATIENT
Start: 2020-08-10

## 2020-08-07 RX ORDER — METHYLPREDNISOLONE SODIUM SUCCINATE 125 MG/2ML
125 INJECTION, POWDER, LYOPHILIZED, FOR SOLUTION INTRAMUSCULAR; INTRAVENOUS ONCE
Status: CANCELLED | OUTPATIENT
Start: 2020-09-21

## 2020-08-07 RX ORDER — HEPARIN SODIUM (PORCINE) LOCK FLUSH IV SOLN 100 UNIT/ML 100 UNIT/ML
500 SOLUTION INTRAVENOUS PRN
Status: CANCELLED | OUTPATIENT
Start: 2020-08-24

## 2020-08-07 RX ORDER — SODIUM CHLORIDE 9 MG/ML
INJECTION, SOLUTION INTRAVENOUS CONTINUOUS
Status: CANCELLED | OUTPATIENT
Start: 2020-09-21

## 2020-08-07 RX ORDER — EPINEPHRINE 1 MG/ML
0.3 INJECTION, SOLUTION, CONCENTRATE INTRAVENOUS PRN
Status: CANCELLED | OUTPATIENT
Start: 2020-09-21

## 2020-08-07 RX ORDER — DIPHENHYDRAMINE HYDROCHLORIDE 50 MG/ML
25 INJECTION INTRAMUSCULAR; INTRAVENOUS ONCE
Status: CANCELLED | OUTPATIENT
Start: 2020-08-10

## 2020-08-07 RX ORDER — DIPHENHYDRAMINE HYDROCHLORIDE 50 MG/ML
50 INJECTION INTRAMUSCULAR; INTRAVENOUS ONCE
Status: CANCELLED | OUTPATIENT
Start: 2020-08-10

## 2020-08-07 RX ORDER — MEPERIDINE HYDROCHLORIDE 50 MG/ML
12.5 INJECTION INTRAMUSCULAR; INTRAVENOUS; SUBCUTANEOUS ONCE
Status: CANCELLED | OUTPATIENT
Start: 2020-08-24

## 2020-08-07 RX ORDER — DIPHENHYDRAMINE HYDROCHLORIDE 50 MG/ML
25 INJECTION INTRAMUSCULAR; INTRAVENOUS ONCE
Status: CANCELLED | OUTPATIENT
Start: 2020-08-31

## 2020-08-07 RX ORDER — SODIUM CHLORIDE 9 MG/ML
20 INJECTION, SOLUTION INTRAVENOUS ONCE
Status: CANCELLED | OUTPATIENT
Start: 2020-09-21

## 2020-08-07 RX ORDER — SODIUM CHLORIDE 0.9 % (FLUSH) 0.9 %
10 SYRINGE (ML) INJECTION PRN
Status: CANCELLED | OUTPATIENT
Start: 2020-08-10

## 2020-08-07 RX ORDER — SODIUM CHLORIDE 9 MG/ML
20 INJECTION, SOLUTION INTRAVENOUS ONCE
Status: CANCELLED | OUTPATIENT
Start: 2020-08-10

## 2020-08-07 RX ORDER — EPINEPHRINE 1 MG/ML
0.3 INJECTION, SOLUTION, CONCENTRATE INTRAVENOUS PRN
Status: CANCELLED | OUTPATIENT
Start: 2020-08-31

## 2020-08-09 NOTE — PROGRESS NOTES
Patient Name: Nate Barnett  Patient : 1950  Patient MRN: [id number]     Primary Oncologist: Steff Webber MD  Referring Provider: Jaswant Perera MD       Date of Service: 8/10/2020      Chief Complaint:   Chief Complaint   Patient presents with    Chemotherapy        Problem List: Patient Active Problem List:     Essential hypertension     Hyperlipidemia     History of colon polyps     Obesity (BMI 30.0-34.9)     Osteopenia of multiple sites     Coronary artery disease involving native coronary artery of native heart without angina pectoris     Hepatic steatosis     Malignant neoplasm of female breast ([address]     Postoperative nausea     Neutropenic fever ([address]     Alopecia (capitis) totalis         HPI: Ms. Tulio Santos is a 78year old very pleasant female with medical history significant for hypertension, hyperlipidemia, coronary artery disease, referred to me on 2020 for evaluation of his clinical stage IIA left breast, high grade, triple negative, invasive ductal carcinoma. She stated that she has echocardiogram and stress test on 2020. It showed normal EF (EF 51%). Concentric left ventricular hypertrophy and she has mild to moderate mitral regurfitation. She was incidentally noted to have left breast mass. She was subsequently evaluated by Dr. Cali Langley and she requested diagnostic mammogram. She underwent diagnostic digital bilateral mammogram on 2020 and it showed dumbbell-shaped mass at 2-3 o'clock at middle to posterior depth with associated pleomorphic calcifications, in her left breast. Targeted ultrasound at 2 o'clock at a distance of 7 cm from the nipple demonstrates a dumbbell-shaped hypoechoic mass with angular margins that measures 3.3 x 2.5 x 2.1 cm. Internal vascularity noted along with some posterior acoustic shadowing. No suspicious axillary lymph nodes.     She underwent ultrasound guided core biopsy of left breast and clip marker placement on  2020 and final pathology showed invasive ductal carcinoma. Tumor size is at least 12 mm and it is a high grade tumor. ER by IHC is negative (0%), PgR by IHC is negative (0%), Her2 by IHC is equivocal (score 2+) and Her2 by FISH is negative. Since she is found to have clinical stage IIA left breast invasive ductal carcinoma, she was subsequently referred to me for further evaluation. She was seen by Dr. Jeimy Santos and she underwent left breast lumpectomy and sentinel lymph node biopsy on March 20, 2020. Final pathology showed stage IIA INVASIVE DUCTAL CARCINOMA WITH EXTENSIVE NECROSIS, GRADE 3, 2.8 CM. IN GREATEST DIMENSION. SURGICAL MARGINS ARE POSITIVE FOR MALIGNANCY. Ductal Carcinoma In Situ and lobular Carcinoma In Situ are not present. One sentinel lymph node examined was negative for metastasis. No lymphovascular or dermal lymphovascular invasion. Pathologic Stage Classification: pT2, N0, Mx. Repeat surgery for positive margins was done on March 30, 2020 and there was no residual cancer seen. Since she is found to have stage IIA left breast triple negative invasive ductal carcinoma, I recommend for adjuvant chemotherapy and radiation therapy. She had Echocardiogram on 2/2020 and her EF was 51%. Dose dense chemotherapy with doxorubicin/cyclophosphamide, followed by Paclitaxel was started on May 4, 2020. On August 10, 2020, she presented to me for follow-up. I have been following her for stage IIA left breast triple negative invasive ductal carcinoma and she is status post left breast lumpectomy and sentinel lymph node biopsy. She is currently on adjuvant chemotherapy with dose dense doxorubicin and cyclophosphamide, followed by weekly paclitaxel since May 4, 2020. She is status post four weekly paclitaxel and she is tolerating current chemotherapy well. She does not encounter any major side effects from the chemotherapy.     Since her chemotherapy induced skin rashes are getting better with triamcinolone cream, I recommend her to continue with that for now. She has diarrhea secondary to taxol. She has been having six to seven episodes prior to taking imodium. I have asked her to take imodium with first episode of diarrhea. She has been asked to increase hydration as well. I recommend that he continue with current chemotherapy and I will set her for her next chemotherapy. We will continue to follow her closely during chemotherapy. She doesn't have any significant symptoms at today visit. Previous Therapies:  AC     Current Therapy:  Weekly taxol     Interval History:  Presented for scheduled follow up. Overall doing very well. She has worsening of her rash without itching or signs of infection, she is continuing topical treatment. She denies fever or infectious symptoms, she has no oral ulcers, denies chest pain, shortness of breath, no nausea or vomiting. She denies lower extremity edema or calf pain. She denies hematochezia or melena. No pain today. Review of Systems: \"Per interval history; otherwise 10 point ROS is negative. \"     Vital Signs:  BP (!) 142/71 (Site: Right Upper Arm, Position: Sitting, Cuff Size: Large Adult)   Pulse 77   Temp 97.3 °F (36.3 °C) (Infrared)   Resp 16   Ht 5' 4\" (1.626 m)   Wt 173 lb (78.5 kg)   LMP  (LMP Unknown)   SpO2 98%   BMI 29.70 kg/m²     Physical Exam:    CONSTITUTIONAL: awake, alert, cooperative, no apparent distress   EYES: pupils equal, round and reactive to light, sclera clear and conjunctiva normal  ENT: Normocephalic, without obvious abnormality, atraumatic  NECK: supple, symmetrical, no jugular venous distension and no carotid bruits   HEMATOLOGIC/LYMPHATIC: no cervical, supraclavicular or axillary lymphadenopathy   LUNGS: no increased work of breathing and clear to auscultation   CARDIOVASCULAR: regular rate and rhythm, normal S1 and S2, no murmur noted  ABDOMEN: normal bowel sounds x 4, soft, non-distended, non-tender, no masses palpated, no hepatosplenomegaly   MUSCULOSKELETAL: full range of motion noted, tone is normal  NEUROLOGIC: awake, alert, oriented to name, place and time. Motor skills grossly intact. SKIN: diffuse macular rash over extremities.   appears intact   EXTREMITIES: no LE edema     Labs:    Hematology:  Lab Results   Component Value Date    WBC 3.0 (L) 08/10/2020    RBC 2.89 (L) 08/10/2020    HGB 9.7 (L) 08/10/2020    HCT 31.4 (L) 08/10/2020    .7 (H) 08/10/2020    MCH 33.6 (H) 08/10/2020    MCHC 30.9 (L) 08/10/2020    RDW 20.2 (H) 08/10/2020     08/10/2020    MPV 9.9 08/10/2020    BANDSPCT 31 (H) 06/13/2020    SEGSPCT 54.4 08/10/2020    EOSRELPCT 4.6 (H) 08/10/2020    BASOPCT 4.0 (H) 08/10/2020    LYMPHOPCT 26.1 08/10/2020    MONOPCT 10.9 (H) 08/10/2020    BANDABS 10.20 06/13/2020    SEGSABS 1.7 08/10/2020    EOSABS 0.1 08/10/2020    BASOSABS 0.1 08/10/2020    LYMPHSABS 0.8 08/10/2020    MONOSABS 0.3 08/10/2020    DIFFTYPE AUTOMATED DIFFERENTIAL 08/10/2020    ANISOCYTOSIS 1+ 06/13/2020    POLYCHROM 1+ 06/13/2020    WBCMORP OCCASIONAL 06/10/2020    PLTM FEW 06/13/2020     No results found for: ESR    Chemistry:  Lab Results   Component Value Date     08/03/2020    K 4.4 08/03/2020     08/03/2020    CO2 26 08/03/2020    BUN 15 08/03/2020    CREATININE 0.7 08/03/2020    GLUCOSE 86 08/03/2020    CALCIUM 10.0 08/03/2020    PROT 6.0 (L) 08/03/2020    LABALBU 4.2 08/03/2020    BILITOT 0.9 08/03/2020    ALKPHOS 52 08/03/2020    AST 34 08/03/2020    ALT 60 (H) 08/03/2020    LABGLOM >60 08/03/2020    GFRAA >60 08/03/2020    AGRATIO 1.9 01/29/2020    GLOB 2.3 01/29/2020    MG 1.6 (L) 06/12/2020     No results found for: MMA, LDH, HOMOCYSTEINE  No components found for: LD  Lab Results   Component Value Date    T4FREE 1.0 02/07/2019       Immunology:  Lab Results   Component Value Date    PROT 6.0 (L) 08/03/2020     No results found for: AIMEE Martinez  No results found for: negative invasive ductal carcinoma and she is status post left breast lumpectomy and sentinel lymph node biopsy. She is currently on adjuvant chemotherapy with dose dense doxorubicin and cyclophosphamide, followed by weekly paclitaxel since May 4, 2020. She is status post four  weekly paclitaxel and she is tolerating current chemotherapy well. She does not encounter any major side effects from the chemotherapy. Since her chemotherapy induced skin rashes are getting better with triamcinolone cream, I recommend her to continue with that for now. She has diarrhea secondary to taxol. She has been having six to seven episodes prior to taking imodium. I have asked her to take imodium with first episode of diarrhea. She has been asked to increase hydration as well. I recommend that he continue with current chemotherapy and I will set her for her next chemotherapy. We will continue to follow her closely during chemotherapy. I answered all her questions and concerns for today. I recommend her to follow-up with primary care physician, Dr. Haydee Rojas on regular basis and I will continue to keep you updated on her progress. Thank you for allowing me to participate in the care of this very pleasant patient. Recent imaging and labs were reviewed and discussed with the patient. No follow-ups on file. ZB  Patient was instructed to stop at our  to make their next appointment before leaving. They will call in the interim with any questions/concerns/new symptoms. This plan was discussed with the patient and they verbalized understanding and acceptance of the plan. Dr. Fani Boyd was available consultation for this visit. I have recommended that the patient follow CDC guidelines for prevention of COVID-19 infection.          Electronically signed by CHARI Marie CNP on 8/9/20 at 11:24 AM ADIAT

## 2020-08-10 ENCOUNTER — HOSPITAL ENCOUNTER (OUTPATIENT)
Dept: INFUSION THERAPY | Age: 70
Discharge: HOME | End: 2020-08-10
Payer: MEDICARE

## 2020-08-10 ENCOUNTER — OFFICE VISIT (OUTPATIENT)
Dept: ONCOLOGY | Age: 70
End: 2020-08-10
Payer: MEDICARE

## 2020-08-10 VITALS
HEIGHT: 64 IN | RESPIRATION RATE: 16 BRPM | WEIGHT: 173 LBS | SYSTOLIC BLOOD PRESSURE: 142 MMHG | BODY MASS INDEX: 29.53 KG/M2 | DIASTOLIC BLOOD PRESSURE: 71 MMHG | TEMPERATURE: 97.5 F | OXYGEN SATURATION: 97 % | HEART RATE: 77 BPM

## 2020-08-10 VITALS
TEMPERATURE: 97.3 F | HEIGHT: 64 IN | OXYGEN SATURATION: 98 % | RESPIRATION RATE: 16 BRPM | WEIGHT: 173 LBS | SYSTOLIC BLOOD PRESSURE: 142 MMHG | BODY MASS INDEX: 29.53 KG/M2 | HEART RATE: 77 BPM | DIASTOLIC BLOOD PRESSURE: 71 MMHG

## 2020-08-10 DIAGNOSIS — Z17.1 MALIGNANT NEOPLASM OF UPPER-OUTER QUADRANT OF LEFT BREAST IN FEMALE, ESTROGEN RECEPTOR NEGATIVE (HCC): ICD-10-CM

## 2020-08-10 DIAGNOSIS — C50.012 BILATERAL MALIGNANT NEOPLASM INVOLVING BOTH NIPPLE AND AREOLA IN FEMALE, UNSPECIFIED ESTROGEN RECEPTOR STATUS (HCC): Primary | ICD-10-CM

## 2020-08-10 DIAGNOSIS — C50.412 MALIGNANT NEOPLASM OF UPPER-OUTER QUADRANT OF LEFT BREAST IN FEMALE, ESTROGEN RECEPTOR NEGATIVE (HCC): ICD-10-CM

## 2020-08-10 DIAGNOSIS — C50.011 BILATERAL MALIGNANT NEOPLASM INVOLVING BOTH NIPPLE AND AREOLA IN FEMALE, UNSPECIFIED ESTROGEN RECEPTOR STATUS (HCC): Primary | ICD-10-CM

## 2020-08-10 PROBLEM — K52.1 DIARRHEA DUE TO DRUG: Status: ACTIVE | Noted: 2020-08-10

## 2020-08-10 LAB
ALBUMIN SERPL-MCNC: 4.2 GM/DL (ref 3.4–5)
ALP BLD-CCNC: 54 IU/L (ref 40–128)
ALT SERPL-CCNC: 56 U/L (ref 10–40)
ANION GAP SERPL CALCULATED.3IONS-SCNC: 12 MMOL/L (ref 4–16)
AST SERPL-CCNC: 34 IU/L (ref 15–37)
BASOPHILS ABSOLUTE: 0.1 K/CU MM
BASOPHILS RELATIVE PERCENT: 4 % (ref 0–1)
BILIRUB SERPL-MCNC: 0.8 MG/DL (ref 0–1)
BUN BLDV-MCNC: 18 MG/DL (ref 6–23)
CALCIUM SERPL-MCNC: 10.1 MG/DL (ref 8.3–10.6)
CHLORIDE BLD-SCNC: 104 MMOL/L (ref 99–110)
CO2: 21 MMOL/L (ref 21–32)
CREAT SERPL-MCNC: 0.8 MG/DL (ref 0.6–1.1)
DIFFERENTIAL TYPE: ABNORMAL
EOSINOPHILS ABSOLUTE: 0.1 K/CU MM
EOSINOPHILS RELATIVE PERCENT: 4.6 % (ref 0–3)
GFR AFRICAN AMERICAN: >60 ML/MIN/1.73M2
GFR NON-AFRICAN AMERICAN: >60 ML/MIN/1.73M2
GLUCOSE BLD-MCNC: 85 MG/DL (ref 70–99)
HCT VFR BLD CALC: 31.4 % (ref 37–47)
HEMOGLOBIN: 9.7 GM/DL (ref 12.5–16)
LYMPHOCYTES ABSOLUTE: 0.8 K/CU MM
LYMPHOCYTES RELATIVE PERCENT: 26.1 % (ref 24–44)
MCH RBC QN AUTO: 33.6 PG (ref 27–31)
MCHC RBC AUTO-ENTMCNC: 30.9 % (ref 32–36)
MCV RBC AUTO: 108.7 FL (ref 78–100)
MONOCYTES ABSOLUTE: 0.3 K/CU MM
MONOCYTES RELATIVE PERCENT: 10.9 % (ref 0–4)
PDW BLD-RTO: 20.2 % (ref 11.7–14.9)
PLATELET # BLD: 336 K/CU MM (ref 140–440)
PMV BLD AUTO: 9.9 FL (ref 7.5–11.1)
POTASSIUM SERPL-SCNC: 4.7 MMOL/L (ref 3.5–5.1)
RBC # BLD: 2.89 M/CU MM (ref 4.2–5.4)
SEGMENTED NEUTROPHILS ABSOLUTE COUNT: 1.7 K/CU MM
SEGMENTED NEUTROPHILS RELATIVE PERCENT: 54.4 % (ref 36–66)
SODIUM BLD-SCNC: 137 MMOL/L (ref 135–145)
TOTAL PROTEIN: 6 GM/DL (ref 6.4–8.2)
WBC # BLD: 3 K/CU MM (ref 4–10.5)

## 2020-08-10 PROCEDURE — 80053 COMPREHEN METABOLIC PANEL: CPT

## 2020-08-10 PROCEDURE — 2500000003 HC RX 250 WO HCPCS: Performed by: INTERNAL MEDICINE

## 2020-08-10 PROCEDURE — 2580000003 HC RX 258: Performed by: INTERNAL MEDICINE

## 2020-08-10 PROCEDURE — 96413 CHEMO IV INFUSION 1 HR: CPT

## 2020-08-10 PROCEDURE — 99214 OFFICE O/P EST MOD 30 MIN: CPT | Performed by: NURSE PRACTITIONER

## 2020-08-10 PROCEDURE — 96365 THER/PROPH/DIAG IV INF INIT: CPT

## 2020-08-10 PROCEDURE — 85025 COMPLETE CBC W/AUTO DIFF WBC: CPT

## 2020-08-10 PROCEDURE — 96375 TX/PRO/DX INJ NEW DRUG ADDON: CPT

## 2020-08-10 PROCEDURE — 36591 DRAW BLOOD OFF VENOUS DEVICE: CPT

## 2020-08-10 PROCEDURE — 6360000002 HC RX W HCPCS: Performed by: INTERNAL MEDICINE

## 2020-08-10 RX ORDER — SODIUM CHLORIDE 0.9 % (FLUSH) 0.9 %
10 SYRINGE (ML) INJECTION PRN
Status: DISCONTINUED | OUTPATIENT
Start: 2020-08-10 | End: 2020-08-11 | Stop reason: HOSPADM

## 2020-08-10 RX ORDER — DIPHENHYDRAMINE HYDROCHLORIDE 50 MG/ML
25 INJECTION INTRAMUSCULAR; INTRAVENOUS ONCE
Status: COMPLETED | OUTPATIENT
Start: 2020-08-10 | End: 2020-08-10

## 2020-08-10 RX ORDER — HEPARIN SODIUM (PORCINE) LOCK FLUSH IV SOLN 100 UNIT/ML 100 UNIT/ML
500 SOLUTION INTRAVENOUS PRN
Status: DISCONTINUED | OUTPATIENT
Start: 2020-08-10 | End: 2020-08-11 | Stop reason: HOSPADM

## 2020-08-10 RX ORDER — DEXAMETHASONE 2 MG/1
TABLET ORAL
COMMUNITY
Start: 2020-07-06 | End: 2021-02-10

## 2020-08-10 RX ORDER — SODIUM CHLORIDE 9 MG/ML
20 INJECTION, SOLUTION INTRAVENOUS ONCE
Status: COMPLETED | OUTPATIENT
Start: 2020-08-10 | End: 2020-08-10

## 2020-08-10 RX ADMIN — Medication 500 UNITS: at 11:51

## 2020-08-10 RX ADMIN — FAMOTIDINE 20 MG: 10 INJECTION, SOLUTION INTRAVENOUS at 10:18

## 2020-08-10 RX ADMIN — DIPHENHYDRAMINE HYDROCHLORIDE 25 MG: 50 INJECTION INTRAMUSCULAR; INTRAVENOUS at 10:20

## 2020-08-10 RX ADMIN — SODIUM CHLORIDE 20 ML/HR: 9 INJECTION, SOLUTION INTRAVENOUS at 10:16

## 2020-08-10 RX ADMIN — PACLITAXEL 150 MG: 6 INJECTION, SOLUTION, CONCENTRATE INTRAVENOUS at 10:44

## 2020-08-10 RX ADMIN — DEXAMETHASONE SODIUM PHOSPHATE: 10 INJECTION INTRAMUSCULAR; INTRAVENOUS at 10:20

## 2020-08-10 RX ADMIN — SODIUM CHLORIDE, PRESERVATIVE FREE 10 ML: 5 INJECTION INTRAVENOUS at 11:51

## 2020-08-10 ASSESSMENT — PATIENT HEALTH QUESTIONNAIRE - PHQ9
1. LITTLE INTEREST OR PLEASURE IN DOING THINGS: 0
9. THOUGHTS THAT YOU WOULD BE BETTER OFF DEAD, OR OF HURTING YOURSELF: 0
7. TROUBLE CONCENTRATING ON THINGS, SUCH AS READING THE NEWSPAPER OR WATCHING TELEVISION: 0
6. FEELING BAD ABOUT YOURSELF - OR THAT YOU ARE A FAILURE OR HAVE LET YOURSELF OR YOUR FAMILY DOWN: 0
SUM OF ALL RESPONSES TO PHQ QUESTIONS 1-9: 3
SUM OF ALL RESPONSES TO PHQ9 QUESTIONS 1 & 2: 0
2. FEELING DOWN, DEPRESSED OR HOPELESS: 0
4. FEELING TIRED OR HAVING LITTLE ENERGY: 2
8. MOVING OR SPEAKING SO SLOWLY THAT OTHER PEOPLE COULD HAVE NOTICED. OR THE OPPOSITE, BEING SO FIGETY OR RESTLESS THAT YOU HAVE BEEN MOVING AROUND A LOT MORE THAN USUAL: 0
10. IF YOU CHECKED OFF ANY PROBLEMS, HOW DIFFICULT HAVE THESE PROBLEMS MADE IT FOR YOU TO DO YOUR WORK, TAKE CARE OF THINGS AT HOME, OR GET ALONG WITH OTHER PEOPLE: 0
5. POOR APPETITE OR OVEREATING: 0
SUM OF ALL RESPONSES TO PHQ QUESTIONS 1-9: 3
3. TROUBLE FALLING OR STAYING ASLEEP: 1

## 2020-08-10 NOTE — PROGRESS NOTES
Pt here for weekly Taxol/OV. Pt agreeable to POC. Call light in reach, Chemo administered as ordered. Pt tolerated well. RTC in one week for treatment. Discharged in stable condition.

## 2020-08-17 ENCOUNTER — HOSPITAL ENCOUNTER (OUTPATIENT)
Dept: INFUSION THERAPY | Age: 70
Discharge: HOME | End: 2020-08-17
Payer: MEDICARE

## 2020-08-17 VITALS
HEART RATE: 76 BPM | BODY MASS INDEX: 30.04 KG/M2 | RESPIRATION RATE: 16 BRPM | TEMPERATURE: 97.3 F | SYSTOLIC BLOOD PRESSURE: 140 MMHG | DIASTOLIC BLOOD PRESSURE: 62 MMHG | OXYGEN SATURATION: 98 % | WEIGHT: 175 LBS

## 2020-08-17 DIAGNOSIS — C50.011 BILATERAL MALIGNANT NEOPLASM INVOLVING BOTH NIPPLE AND AREOLA IN FEMALE, UNSPECIFIED ESTROGEN RECEPTOR STATUS (HCC): Primary | ICD-10-CM

## 2020-08-17 DIAGNOSIS — C50.012 BILATERAL MALIGNANT NEOPLASM INVOLVING BOTH NIPPLE AND AREOLA IN FEMALE, UNSPECIFIED ESTROGEN RECEPTOR STATUS (HCC): Primary | ICD-10-CM

## 2020-08-17 LAB
ALBUMIN SERPL-MCNC: 4.3 GM/DL (ref 3.4–5)
ALP BLD-CCNC: 56 IU/L (ref 40–128)
ALT SERPL-CCNC: 55 U/L (ref 10–40)
ANION GAP SERPL CALCULATED.3IONS-SCNC: 9 MMOL/L (ref 4–16)
AST SERPL-CCNC: 32 IU/L (ref 15–37)
BASOPHILS ABSOLUTE: 0.1 K/CU MM
BASOPHILS RELATIVE PERCENT: 3.2 % (ref 0–1)
BILIRUB SERPL-MCNC: 0.8 MG/DL (ref 0–1)
BUN BLDV-MCNC: 18 MG/DL (ref 6–23)
CALCIUM SERPL-MCNC: 9.6 MG/DL (ref 8.3–10.6)
CHLORIDE BLD-SCNC: 101 MMOL/L (ref 99–110)
CO2: 25 MMOL/L (ref 21–32)
CREAT SERPL-MCNC: 0.9 MG/DL (ref 0.6–1.1)
DIFFERENTIAL TYPE: ABNORMAL
EOSINOPHILS ABSOLUTE: 0.2 K/CU MM
EOSINOPHILS RELATIVE PERCENT: 4 % (ref 0–3)
GFR AFRICAN AMERICAN: >60 ML/MIN/1.73M2
GFR NON-AFRICAN AMERICAN: >60 ML/MIN/1.73M2
GLUCOSE BLD-MCNC: 98 MG/DL (ref 70–99)
HCT VFR BLD CALC: 29.5 % (ref 37–47)
HEMOGLOBIN: 9.8 GM/DL (ref 12.5–16)
LYMPHOCYTES ABSOLUTE: 0.8 K/CU MM
LYMPHOCYTES RELATIVE PERCENT: 22.3 % (ref 24–44)
MCH RBC QN AUTO: 33.9 PG (ref 27–31)
MCHC RBC AUTO-ENTMCNC: 33.2 % (ref 32–36)
MCV RBC AUTO: 102.1 FL (ref 78–100)
MONOCYTES ABSOLUTE: 0.4 K/CU MM
MONOCYTES RELATIVE PERCENT: 11.5 % (ref 0–4)
PDW BLD-RTO: 19.4 % (ref 11.7–14.9)
PLATELET # BLD: 338 K/CU MM (ref 140–440)
PMV BLD AUTO: 9.3 FL (ref 7.5–11.1)
POTASSIUM SERPL-SCNC: 4.4 MMOL/L (ref 3.5–5.1)
RBC # BLD: 2.89 M/CU MM (ref 4.2–5.4)
SEGMENTED NEUTROPHILS ABSOLUTE COUNT: 2.2 K/CU MM
SEGMENTED NEUTROPHILS RELATIVE PERCENT: 59 % (ref 36–66)
SODIUM BLD-SCNC: 135 MMOL/L (ref 135–145)
TOTAL PROTEIN: 6.1 GM/DL (ref 6.4–8.2)
WBC # BLD: 3.7 K/CU MM (ref 4–10.5)

## 2020-08-17 PROCEDURE — 6360000002 HC RX W HCPCS: Performed by: INTERNAL MEDICINE

## 2020-08-17 PROCEDURE — 36591 DRAW BLOOD OFF VENOUS DEVICE: CPT

## 2020-08-17 PROCEDURE — 2580000003 HC RX 258: Performed by: INTERNAL MEDICINE

## 2020-08-17 PROCEDURE — 80053 COMPREHEN METABOLIC PANEL: CPT

## 2020-08-17 PROCEDURE — 85025 COMPLETE CBC W/AUTO DIFF WBC: CPT

## 2020-08-17 RX ORDER — SODIUM CHLORIDE 0.9 % (FLUSH) 0.9 %
20 SYRINGE (ML) INJECTION PRN
Status: CANCELLED | OUTPATIENT
Start: 2020-08-17

## 2020-08-17 RX ORDER — SODIUM CHLORIDE 0.9 % (FLUSH) 0.9 %
10 SYRINGE (ML) INJECTION PRN
Status: DISCONTINUED | OUTPATIENT
Start: 2020-08-17 | End: 2020-08-18 | Stop reason: HOSPADM

## 2020-08-17 RX ORDER — HEPARIN SODIUM (PORCINE) LOCK FLUSH IV SOLN 100 UNIT/ML 100 UNIT/ML
500 SOLUTION INTRAVENOUS PRN
Status: DISCONTINUED | OUTPATIENT
Start: 2020-08-17 | End: 2020-08-18 | Stop reason: HOSPADM

## 2020-08-17 RX ORDER — SODIUM CHLORIDE 0.9 % (FLUSH) 0.9 %
10 SYRINGE (ML) INJECTION PRN
Status: CANCELLED | OUTPATIENT
Start: 2020-08-17

## 2020-08-17 RX ORDER — HEPARIN SODIUM (PORCINE) LOCK FLUSH IV SOLN 100 UNIT/ML 100 UNIT/ML
500 SOLUTION INTRAVENOUS PRN
Status: CANCELLED | OUTPATIENT
Start: 2020-08-17

## 2020-08-17 RX ORDER — METHYLPREDNISOLONE 4 MG/1
TABLET ORAL
Qty: 1 KIT | Refills: 0 | Status: SHIPPED | OUTPATIENT
Start: 2020-08-17 | End: 2020-08-23

## 2020-08-17 RX ADMIN — SODIUM CHLORIDE, PRESERVATIVE FREE 10 ML: 5 INJECTION INTRAVENOUS at 11:05

## 2020-08-17 RX ADMIN — Medication 500 UNITS: at 11:05

## 2020-08-17 NOTE — PROGRESS NOTES
Here for weekly Taxol. C/o red, itchy rash on arms and legs. Rash covers both arms, legs. Using topical steroid cream, voiced rash was clearing up and then flared up again this week. Dr. Pipo Hankins ordered chemo to be held this week. Ordered Medrol dose pack, can continue to use topical cream as well. Instructed patient on medication administration. Pt verbalizes correct understanding. AVS reviewed, copy given to patient. Will call if any issues arise. Discharged in stable condition.

## 2020-08-24 ENCOUNTER — HOSPITAL ENCOUNTER (OUTPATIENT)
Dept: INFUSION THERAPY | Age: 70
Discharge: HOME | End: 2020-08-24
Payer: MEDICARE

## 2020-08-24 VITALS
DIASTOLIC BLOOD PRESSURE: 66 MMHG | WEIGHT: 175 LBS | RESPIRATION RATE: 12 BRPM | SYSTOLIC BLOOD PRESSURE: 154 MMHG | HEART RATE: 58 BPM | TEMPERATURE: 98.2 F | BODY MASS INDEX: 30.04 KG/M2 | OXYGEN SATURATION: 96 %

## 2020-08-24 DIAGNOSIS — Z17.1 MALIGNANT NEOPLASM OF UPPER-OUTER QUADRANT OF LEFT BREAST IN FEMALE, ESTROGEN RECEPTOR NEGATIVE (HCC): ICD-10-CM

## 2020-08-24 DIAGNOSIS — C50.012 BILATERAL MALIGNANT NEOPLASM INVOLVING BOTH NIPPLE AND AREOLA IN FEMALE, UNSPECIFIED ESTROGEN RECEPTOR STATUS (HCC): Primary | ICD-10-CM

## 2020-08-24 DIAGNOSIS — C50.412 MALIGNANT NEOPLASM OF UPPER-OUTER QUADRANT OF LEFT BREAST IN FEMALE, ESTROGEN RECEPTOR NEGATIVE (HCC): ICD-10-CM

## 2020-08-24 DIAGNOSIS — C50.011 BILATERAL MALIGNANT NEOPLASM INVOLVING BOTH NIPPLE AND AREOLA IN FEMALE, UNSPECIFIED ESTROGEN RECEPTOR STATUS (HCC): Primary | ICD-10-CM

## 2020-08-24 LAB
ALBUMIN SERPL-MCNC: 4.3 GM/DL (ref 3.4–5)
ALP BLD-CCNC: 54 IU/L (ref 40–128)
ALT SERPL-CCNC: 53 U/L (ref 10–40)
ANION GAP SERPL CALCULATED.3IONS-SCNC: 12 MMOL/L (ref 4–16)
AST SERPL-CCNC: 34 IU/L (ref 15–37)
BASOPHILS ABSOLUTE: 0.1 K/CU MM
BASOPHILS RELATIVE PERCENT: 1.5 % (ref 0–1)
BILIRUB SERPL-MCNC: 0.9 MG/DL (ref 0–1)
BUN BLDV-MCNC: 18 MG/DL (ref 6–23)
CALCIUM SERPL-MCNC: 10.3 MG/DL (ref 8.3–10.6)
CHLORIDE BLD-SCNC: 99 MMOL/L (ref 99–110)
CO2: 28 MMOL/L (ref 21–32)
CREAT SERPL-MCNC: 0.9 MG/DL (ref 0.6–1.1)
DIFFERENTIAL TYPE: ABNORMAL
EOSINOPHILS ABSOLUTE: 0.2 K/CU MM
EOSINOPHILS RELATIVE PERCENT: 2.4 % (ref 0–3)
GFR AFRICAN AMERICAN: >60 ML/MIN/1.73M2
GFR NON-AFRICAN AMERICAN: >60 ML/MIN/1.73M2
GLUCOSE BLD-MCNC: 99 MG/DL (ref 70–99)
HCT VFR BLD CALC: 33.4 % (ref 37–47)
HEMOGLOBIN: 11 GM/DL (ref 12.5–16)
LYMPHOCYTES ABSOLUTE: 1.2 K/CU MM
LYMPHOCYTES RELATIVE PERCENT: 18.8 % (ref 24–44)
MCH RBC QN AUTO: 33.6 PG (ref 27–31)
MCHC RBC AUTO-ENTMCNC: 32.9 % (ref 32–36)
MCV RBC AUTO: 102.1 FL (ref 78–100)
MONOCYTES ABSOLUTE: 1.4 K/CU MM
MONOCYTES RELATIVE PERCENT: 22.2 % (ref 0–4)
PDW BLD-RTO: 19.2 % (ref 11.7–14.9)
PLATELET # BLD: 336 K/CU MM (ref 140–440)
PMV BLD AUTO: 9.2 FL (ref 7.5–11.1)
POTASSIUM SERPL-SCNC: 4 MMOL/L (ref 3.5–5.1)
RBC # BLD: 3.27 M/CU MM (ref 4.2–5.4)
SEGMENTED NEUTROPHILS ABSOLUTE COUNT: 3.4 K/CU MM
SEGMENTED NEUTROPHILS RELATIVE PERCENT: 55.1 % (ref 36–66)
SODIUM BLD-SCNC: 139 MMOL/L (ref 135–145)
TOTAL PROTEIN: 6.1 GM/DL (ref 6.4–8.2)
WBC # BLD: 6.2 K/CU MM (ref 4–10.5)

## 2020-08-24 PROCEDURE — 6360000002 HC RX W HCPCS: Performed by: INTERNAL MEDICINE

## 2020-08-24 PROCEDURE — 85025 COMPLETE CBC W/AUTO DIFF WBC: CPT

## 2020-08-24 PROCEDURE — 2580000003 HC RX 258: Performed by: INTERNAL MEDICINE

## 2020-08-24 PROCEDURE — 96375 TX/PRO/DX INJ NEW DRUG ADDON: CPT

## 2020-08-24 PROCEDURE — 96413 CHEMO IV INFUSION 1 HR: CPT

## 2020-08-24 PROCEDURE — 80053 COMPREHEN METABOLIC PANEL: CPT

## 2020-08-24 PROCEDURE — 2500000003 HC RX 250 WO HCPCS: Performed by: INTERNAL MEDICINE

## 2020-08-24 RX ORDER — DIPHENHYDRAMINE HYDROCHLORIDE 50 MG/ML
25 INJECTION INTRAMUSCULAR; INTRAVENOUS ONCE
Status: COMPLETED | OUTPATIENT
Start: 2020-08-24 | End: 2020-08-24

## 2020-08-24 RX ORDER — SODIUM CHLORIDE 9 MG/ML
20 INJECTION, SOLUTION INTRAVENOUS ONCE
Status: DISCONTINUED | OUTPATIENT
Start: 2020-08-24 | End: 2020-08-25 | Stop reason: HOSPADM

## 2020-08-24 RX ORDER — SODIUM CHLORIDE 0.9 % (FLUSH) 0.9 %
10 SYRINGE (ML) INJECTION PRN
Status: DISCONTINUED | OUTPATIENT
Start: 2020-08-24 | End: 2020-08-25 | Stop reason: HOSPADM

## 2020-08-24 RX ORDER — HEPARIN SODIUM (PORCINE) LOCK FLUSH IV SOLN 100 UNIT/ML 100 UNIT/ML
500 SOLUTION INTRAVENOUS PRN
Status: DISCONTINUED | OUTPATIENT
Start: 2020-08-24 | End: 2020-08-25 | Stop reason: HOSPADM

## 2020-08-24 RX ADMIN — FAMOTIDINE 20 MG: 10 INJECTION, SOLUTION INTRAVENOUS at 11:20

## 2020-08-24 RX ADMIN — PACLITAXEL 150 MG: 6 INJECTION, SOLUTION INTRAVENOUS at 12:02

## 2020-08-24 RX ADMIN — DIPHENHYDRAMINE HYDROCHLORIDE 25 MG: 50 INJECTION INTRAMUSCULAR; INTRAVENOUS at 11:20

## 2020-08-24 RX ADMIN — SODIUM CHLORIDE 20 ML/HR: 9 INJECTION, SOLUTION INTRAVENOUS at 11:20

## 2020-08-24 RX ADMIN — DEXAMETHASONE SODIUM PHOSPHATE: 10 INJECTION INTRAMUSCULAR; INTRAVENOUS at 11:25

## 2020-08-31 ENCOUNTER — HOSPITAL ENCOUNTER (OUTPATIENT)
Dept: INFUSION THERAPY | Age: 70
Discharge: HOME | End: 2020-08-31
Payer: MEDICARE

## 2020-08-31 VITALS
TEMPERATURE: 97.7 F | WEIGHT: 176.6 LBS | HEART RATE: 62 BPM | HEIGHT: 64 IN | RESPIRATION RATE: 16 BRPM | SYSTOLIC BLOOD PRESSURE: 157 MMHG | BODY MASS INDEX: 30.15 KG/M2 | DIASTOLIC BLOOD PRESSURE: 71 MMHG

## 2020-08-31 DIAGNOSIS — C50.412 MALIGNANT NEOPLASM OF UPPER-OUTER QUADRANT OF LEFT BREAST IN FEMALE, ESTROGEN RECEPTOR NEGATIVE (HCC): ICD-10-CM

## 2020-08-31 DIAGNOSIS — Z17.1 MALIGNANT NEOPLASM OF UPPER-OUTER QUADRANT OF LEFT BREAST IN FEMALE, ESTROGEN RECEPTOR NEGATIVE (HCC): ICD-10-CM

## 2020-08-31 DIAGNOSIS — C50.012 BILATERAL MALIGNANT NEOPLASM INVOLVING BOTH NIPPLE AND AREOLA IN FEMALE, UNSPECIFIED ESTROGEN RECEPTOR STATUS (HCC): Primary | ICD-10-CM

## 2020-08-31 DIAGNOSIS — C50.011 BILATERAL MALIGNANT NEOPLASM INVOLVING BOTH NIPPLE AND AREOLA IN FEMALE, UNSPECIFIED ESTROGEN RECEPTOR STATUS (HCC): Primary | ICD-10-CM

## 2020-08-31 LAB
ALBUMIN SERPL-MCNC: 4.2 GM/DL (ref 3.4–5)
ALP BLD-CCNC: 55 IU/L (ref 40–128)
ALT SERPL-CCNC: 80 U/L (ref 10–40)
ANION GAP SERPL CALCULATED.3IONS-SCNC: 14 MMOL/L (ref 4–16)
AST SERPL-CCNC: 49 IU/L (ref 15–37)
BASOPHILS ABSOLUTE: 0.1 K/CU MM
BASOPHILS RELATIVE PERCENT: 1.4 % (ref 0–1)
BILIRUB SERPL-MCNC: 0.8 MG/DL (ref 0–1)
BUN BLDV-MCNC: 12 MG/DL (ref 6–23)
CALCIUM SERPL-MCNC: 9.9 MG/DL (ref 8.3–10.6)
CHLORIDE BLD-SCNC: 103 MMOL/L (ref 99–110)
CO2: 22 MMOL/L (ref 21–32)
CREAT SERPL-MCNC: 0.8 MG/DL (ref 0.6–1.1)
DIFFERENTIAL TYPE: ABNORMAL
EOSINOPHILS ABSOLUTE: 0.2 K/CU MM
EOSINOPHILS RELATIVE PERCENT: 2.9 % (ref 0–3)
GFR AFRICAN AMERICAN: >60 ML/MIN/1.73M2
GFR NON-AFRICAN AMERICAN: >60 ML/MIN/1.73M2
GLUCOSE BLD-MCNC: 92 MG/DL (ref 70–99)
HCT VFR BLD CALC: 33 % (ref 37–47)
HEMOGLOBIN: 10.9 GM/DL (ref 12.5–16)
LYMPHOCYTES ABSOLUTE: 1.1 K/CU MM
LYMPHOCYTES RELATIVE PERCENT: 16.9 % (ref 24–44)
MCH RBC QN AUTO: 34 PG (ref 27–31)
MCHC RBC AUTO-ENTMCNC: 33 % (ref 32–36)
MCV RBC AUTO: 102.8 FL (ref 78–100)
MONOCYTES ABSOLUTE: 0.4 K/CU MM
MONOCYTES RELATIVE PERCENT: 5.9 % (ref 0–4)
PDW BLD-RTO: 17.7 % (ref 11.7–14.9)
PLATELET # BLD: 211 K/CU MM (ref 140–440)
PMV BLD AUTO: 10 FL (ref 7.5–11.1)
POTASSIUM SERPL-SCNC: 4.5 MMOL/L (ref 3.5–5.1)
RBC # BLD: 3.21 M/CU MM (ref 4.2–5.4)
SEGMENTED NEUTROPHILS ABSOLUTE COUNT: 4.8 K/CU MM
SEGMENTED NEUTROPHILS RELATIVE PERCENT: 72.9 % (ref 36–66)
SODIUM BLD-SCNC: 139 MMOL/L (ref 135–145)
TOTAL PROTEIN: 6 GM/DL (ref 6.4–8.2)
WBC # BLD: 6.6 K/CU MM (ref 4–10.5)

## 2020-08-31 PROCEDURE — 2580000003 HC RX 258: Performed by: INTERNAL MEDICINE

## 2020-08-31 PROCEDURE — 2500000003 HC RX 250 WO HCPCS: Performed by: INTERNAL MEDICINE

## 2020-08-31 PROCEDURE — 80053 COMPREHEN METABOLIC PANEL: CPT

## 2020-08-31 PROCEDURE — 6360000002 HC RX W HCPCS: Performed by: INTERNAL MEDICINE

## 2020-08-31 PROCEDURE — 96413 CHEMO IV INFUSION 1 HR: CPT

## 2020-08-31 PROCEDURE — 96375 TX/PRO/DX INJ NEW DRUG ADDON: CPT

## 2020-08-31 PROCEDURE — 85025 COMPLETE CBC W/AUTO DIFF WBC: CPT

## 2020-08-31 PROCEDURE — 36591 DRAW BLOOD OFF VENOUS DEVICE: CPT

## 2020-08-31 RX ORDER — SODIUM CHLORIDE 9 MG/ML
20 INJECTION, SOLUTION INTRAVENOUS ONCE
Status: DISCONTINUED | OUTPATIENT
Start: 2020-08-31 | End: 2020-09-01 | Stop reason: HOSPADM

## 2020-08-31 RX ORDER — SODIUM CHLORIDE 0.9 % (FLUSH) 0.9 %
10 SYRINGE (ML) INJECTION PRN
Status: DISCONTINUED | OUTPATIENT
Start: 2020-08-31 | End: 2020-09-01 | Stop reason: HOSPADM

## 2020-08-31 RX ORDER — DIPHENHYDRAMINE HYDROCHLORIDE 50 MG/ML
25 INJECTION INTRAMUSCULAR; INTRAVENOUS ONCE
Status: COMPLETED | OUTPATIENT
Start: 2020-08-31 | End: 2020-08-31

## 2020-08-31 RX ORDER — HEPARIN SODIUM (PORCINE) LOCK FLUSH IV SOLN 100 UNIT/ML 100 UNIT/ML
500 SOLUTION INTRAVENOUS PRN
Status: DISCONTINUED | OUTPATIENT
Start: 2020-08-31 | End: 2020-09-01 | Stop reason: HOSPADM

## 2020-08-31 RX ADMIN — FAMOTIDINE 20 MG: 10 INJECTION, SOLUTION INTRAVENOUS at 10:10

## 2020-08-31 RX ADMIN — PACLITAXEL 150 MG: 6 INJECTION, SOLUTION, CONCENTRATE INTRAVENOUS at 10:32

## 2020-08-31 RX ADMIN — DIPHENHYDRAMINE HYDROCHLORIDE 25 MG: 50 INJECTION INTRAMUSCULAR; INTRAVENOUS at 10:10

## 2020-08-31 RX ADMIN — SODIUM CHLORIDE 20 ML/HR: 9 INJECTION, SOLUTION INTRAVENOUS at 10:07

## 2020-08-31 RX ADMIN — DEXAMETHASONE SODIUM PHOSPHATE: 10 INJECTION INTRAMUSCULAR; INTRAVENOUS at 10:11

## 2020-09-01 ENCOUNTER — TELEPHONE (OUTPATIENT)
Dept: INFUSION THERAPY | Age: 70
End: 2020-09-01

## 2020-09-01 ENCOUNTER — TELEPHONE (OUTPATIENT)
Dept: FAMILY MEDICINE CLINIC | Age: 70
End: 2020-09-01

## 2020-09-01 NOTE — TELEPHONE ENCOUNTER
Called to ask pt if we can switch her ov/tx appt from 9/8 to 9/10; pt said this is fine & voiced understanding of dates/times.

## 2020-09-03 NOTE — PROGRESS NOTES
Patient Name: Jose M Flannery  Patient : 1950  Patient MRN: [id number]     Primary Oncologist: Edgardo Hannah MD  Referring Provider: Gregoria Arguello MD     Date of Service: 9/10/2020      Chief Complaint:   Chief Complaint   Patient presents with   [address]     Patient Active Problem List:     Malignant neoplasm of female breast      Postoperative nausea     Neutropenic fever      Alopecia (capitis) totalis    HPI:   Ms. Susana Sommers is a 78year old very pleasant female with medical history significant for hypertension, hyperlipidemia, coronary artery disease, referred to me on 2020 for evaluation of his clinical stage IIA left breast, high grade, triple negative, invasive ductal carcinoma. She stated that she has echocardiogram and stress test on 2020. It showed normal EF (EF 51%). Concentric left ventricular hypertrophy and she has mild to moderate mitral regurfitation. She was incidentally noted to have left breast mass. She was subsequently evaluated by Dr. Gabbi Robbins and she requested diagnostic mammogram. She underwent diagnostic digital bilateral mammogram on 2020 and it showed dumbbell-shaped mass at 2-3 o'clock at middle to posterior depth with associated pleomorphic calcifications, in her left breast. Targeted ultrasound at 2 o'clock at a distance of 7 cm from the nipple demonstrates a dumbbell-shaped hypoechoic mass with angular margins that measures 3.3 x 2.5 x 2.1 cm. Internal vascularity noted along with some posterior acoustic shadowing. No suspicious axillary lymph nodes. She underwent ultrasound guided core biopsy of left breast and clip marker placement on 2020 and final pathology showed invasive ductal carcinoma. Tumor size is at least 12 mm and it is a high grade tumor. ER by IHC is negative (0%), PgR by IHC is negative (0%), Her2 by IHC is equivocal (score 2+) and Her2 by FISH is negative.      Since she is found to have clinical stage IIA left breast invasive ductal carcinoma, she was subsequently referred to me for further evaluation. She was seen by Dr. Cristhian Cannon and she underwent left breast lumpectomy and sentinel lymph node biopsy on March 20, 2020. Final pathology showed stage IIA INVASIVE DUCTAL CARCINOMA WITH EXTENSIVE NECROSIS, GRADE 3, 2.8 CM. IN GREATEST DIMENSION. SURGICAL MARGINS ARE POSITIVE FOR MALIGNANCY. Ductal Carcinoma In Situ and lobular Carcinoma In Situ are not present. One sentinel lymph node examined was negative for metastasis. No lymphovascular or dermal lymphovascular invasion. Pathologic Stage Classification: pT2, N0, Mx. Repeat surgery for positive margins was done on March 30, 2020 and there was no residual cancer seen. Since she is found to have stage IIA left breast triple negative invasive ductal carcinoma, I recommend for adjuvant chemotherapy and radiation therapy. She had Echocardiogram on 2/2020 and her EF was 51%. Dose dense chemotherapy with doxorubicin/cyclophosphamide, followed by Paclitaxel was started on May 4, 2020. On September 10, 2020, she presented to me for follow-up. I have been following her for stage IIA left breast triple negative invasive ductal carcinoma and she is status post left breast lumpectomy and sentinel lymph node biopsy. She is currently on adjuvant chemotherapy with dose dense doxorubicin and cyclophosphamide, followed by weekly paclitaxel since May 4, 2020. She is status post seven weekly paclitaxel and she is tolerating current chemotherapy well. She does not encounter any major side effects from the chemotherapy. She has chemo induced nail changes and she most likely loose some of her nail. Since her [address] is only 1.1 today, will hold chemo for this week. Otherwise, I recommend that he continue with current chemotherapy and I will set her for her next chemotherapy on next week. We will continue to follow her closely during chemotherapy.     She doesn't have any significant symptoms at today visit. Past Medical History  Significant for  1. Hypertension  2. Hyperlipidemia  3. Obesity  4. Coronary artery disease    Surgical History  Significant for  1. Shoulder repair in 2016  2.  section in     Allergies  Tylenol-Codeine    Social History  She denies smoking, alcohol drinking and illicit drug abuse. She is currently living in [facility]. Family History  Father: Hypertension  Mother: Hypertension    Review of Systems: \"Per interval history; otherwise 10 point ROS is negative. \"   Her energy level is fair, appetite, and sleep are okay. She doesn't have fever, chills, night sweats, cough, shortness of breath, chest pain, hemoptysis or palpitations. Her bowel and bladder functions are normal. She denies nausea, vomiting, abdominal pain, diarrhea, constipation, dysuria, loss of appetite or weight loss. Doesn't have neuropathy and does not have bleeding or clotting issues. Denies any pain in her body. No anxiety or depression. The rest of the systems are unremarkable.     Vital Signs:  /69 (Site: Right Upper Arm, Position: Sitting, Cuff Size: Large Adult)   Pulse 76   Temp 96.8 °F (36 °C) (Infrared)   Resp 16   Ht 5' 4\" (1.626 m)   Wt 175 lb 12.8 oz (79.7 kg)   LMP  (LMP Unknown)   SpO2 97%   BMI 30.18 kg/m²     Physical Exam:  CONSTITUTIONAL: awake, alert, cooperative, no apparent distress   EYES: pupils equal, round and reactive to light, sclera clear and conjunctiva normal  ENT: Normocephalic, without obvious abnormality, atraumatic  NECK: supple, symmetrical, no jugular venous distension and no carotid bruits   HEMATOLOGIC/LYMPHATIC: no cervical, supraclavicular or axillary lymphadenopathy   LUNGS: no increased work of breathing and clear to auscultation   CARDIOVASCULAR: regular rate and rhythm, normal S1 and S2, no murmur noted  ABDOMEN: normal bowel sounds x 4, soft, non-distended, non-tender, no masses palpated, no hepatosplenomegaly   MUSCULOSKELETAL: full range of motion noted, tone is normal  NEUROLOGIC: awake, alert, oriented to name, place and time. Motor skills grossly intact. SKIN: Normal skin color, texture, turgor and no jaundice.  appears intact   EXTREMITIES: no LE edema     Labs:  Hematology:  Lab Results   Component Value Date    WBC 2.6 (L) 09/10/2020    RBC 3.16 (L) 09/10/2020    HGB 10.7 (L) 09/10/2020    HCT 32.0 (L) 09/10/2020    .3 (H) 09/10/2020    MCH 33.9 (H) 09/10/2020    MCHC 33.4 09/10/2020    RDW 17.0 (H) 09/10/2020     09/10/2020    MPV 9.5 09/10/2020    BANDSPCT 31 (H) 06/13/2020    SEGSPCT 40.6 09/10/2020    EOSRELPCT 6.9 (H) 09/10/2020    BASOPCT 1.9 (H) 09/10/2020    LYMPHOPCT 26.1 09/10/2020    MONOPCT 24.5 (H) 09/10/2020    BANDABS 10.20 06/13/2020    SEGSABS 1.1 09/10/2020    EOSABS 0.2 09/10/2020    BASOSABS 0.1 09/10/2020    LYMPHSABS 0.7 09/10/2020    MONOSABS 0.6 09/10/2020    DIFFTYPE AUTOMATED DIFFERENTIAL 09/10/2020    ANISOCYTOSIS 1+ 06/13/2020    POLYCHROM 1+ 06/13/2020    WBCMORP OCCASIONAL 06/10/2020    PLTM FEW 06/13/2020     No results found for: ESR  Chemistry:  Lab Results   Component Value Date     08/31/2020    K 4.5 08/31/2020     08/31/2020    CO2 22 08/31/2020    BUN 12 08/31/2020    CREATININE 0.8 08/31/2020    GLUCOSE 92 08/31/2020    CALCIUM 9.9 08/31/2020    PROT 6.0 (L) 08/31/2020    LABALBU 4.2 08/31/2020    BILITOT 0.8 08/31/2020    ALKPHOS 55 08/31/2020    AST 49 (H) 08/31/2020    ALT 80 (H) 08/31/2020    LABGLOM >60 08/31/2020    GFRAA >60 08/31/2020    AGRATIO 1.9 01/29/2020    GLOB 2.3 01/29/2020    MG 1.6 (L) 06/12/2020     No results found for: MMA, LDH, HOMOCYSTEINE  No components found for: LD  Lab Results   Component Value Date    T4FREE 1.0 02/07/2019     Immunology:  Lab Results   Component Value Date    PROT 6.0 (L) 08/31/2020     No results found for: Heddy Livers, KLFLCR  No results found for: B2M  Coagulation Panel:  No results found for: PROTIME, INR, APTT, DDIMER  Anemia Panel:  No results found for: [id number], FOLATE  Tumor Markers:  Lab Results   Component Value Date    LABCA2 28.6 04/30/2020     Observations:  PHQ-9 Total Score: 0 (9/10/2020  9:39 AM)        Assessment & Plan:   Clinical stage IIA left breast, high grade, triple negative, invasive ductal carcinoma    PLAN  Ms. Thurman Babinski is a 78year old very pleasant female who was incidentally found to have left breast mass on Echocardiogram done on February 11, 2020. Further work ups with diagnostic digital mammogram and targeted sonogram of left breast revealed that she has about 3.3 cm mass in her left breast upper outer quadrant. She subsequently had ultrasound guided biopsy of the left breast mass on February 25, 2020 and final pathology revealed clinical stage IIA high grade, triple negative, left breast invasive ductal carcinoma. She was seen by Dr. Livan Desir and she underwent left breast lumpectomy and sentinel lymph node biopsy on March 20, 2020. Final pathology showed stage IIA INVASIVE DUCTAL CARCINOMA WITH EXTENSIVE NECROSIS, GRADE 3, 2.8 CM. IN GREATEST DIMENSION. SURGICAL MARGINS ARE POSITIVE FOR MALIGNANCY. Ductal Carcinoma In Situ and lobular Carcinoma In Situ are not present. One sentinel lymph node examined was negative for metastasis. No lymphovascular or dermal lymphovascular invasion. Pathologic Stage Classification: pT2, N0, Mx. Repeat surgery for positive margins was done on March 30, 2020 and there was no residual cancer seen. Since she is found to have stage IIA left breast triple negative invasive ductal carcinoma, I recommend for adjuvant chemotherapy and radiation therapy. She had Echocardiogram on 2/2020 and her EF was 51%. Dose dense chemotherapy with doxorubicin/cyclophosphamide, followed by Paclitaxel was started on May 4, 2020. On September 10, 2020, she presented to me for follow-up.   I have been following her for stage IIA left breast triple negative invasive ductal carcinoma and she is status post left breast lumpectomy and sentinel lymph node biopsy. She is currently on adjuvant chemotherapy with dose dense doxorubicin and cyclophosphamide, followed by weekly paclitaxel since May 4, 2020. She is status post seven weekly paclitaxel and she is tolerating current chemotherapy well. She does not encounter any major side effects from the chemotherapy. She has chemo induced nail changes and she most likely loose some of her nail. Since her [address] is only 1.1 today, will hold chemo for this week. Otherwise, I recommend that he continue with current chemotherapy and I will set her for her next chemotherapy on next week. We will continue to follow her closely during chemotherapy. I answered all her questions and concerns for today. I recommend her to follow-up with primary care physician, Dr. Woodford Bernheim on regular basis and I will continue to keep you updated on her progress. Thank you for allowing me to participate in the care of this very pleasant patient. Recent imaging and labs were reviewed and discussed with the patient.       Electronically signed by Karis Bernal MD on 9/1/20 at 8:03 AM RANDY

## 2020-09-10 ENCOUNTER — HOSPITAL ENCOUNTER (OUTPATIENT)
Dept: INFUSION THERAPY | Age: 70
Discharge: HOME | End: 2020-09-10
Payer: MEDICARE

## 2020-09-10 ENCOUNTER — OFFICE VISIT (OUTPATIENT)
Dept: ONCOLOGY | Age: 70
End: 2020-09-10
Payer: MEDICARE

## 2020-09-10 VITALS
DIASTOLIC BLOOD PRESSURE: 69 MMHG | HEIGHT: 64 IN | RESPIRATION RATE: 16 BRPM | HEART RATE: 76 BPM | TEMPERATURE: 96.8 F | WEIGHT: 175.8 LBS | OXYGEN SATURATION: 97 % | BODY MASS INDEX: 30.01 KG/M2 | SYSTOLIC BLOOD PRESSURE: 126 MMHG

## 2020-09-10 VITALS
SYSTOLIC BLOOD PRESSURE: 126 MMHG | BODY MASS INDEX: 30.18 KG/M2 | WEIGHT: 175.8 LBS | RESPIRATION RATE: 16 BRPM | TEMPERATURE: 96.8 F | OXYGEN SATURATION: 97 % | DIASTOLIC BLOOD PRESSURE: 69 MMHG | HEART RATE: 76 BPM

## 2020-09-10 DIAGNOSIS — Z17.1 MALIGNANT NEOPLASM OF UPPER-OUTER QUADRANT OF LEFT BREAST IN FEMALE, ESTROGEN RECEPTOR NEGATIVE (HCC): ICD-10-CM

## 2020-09-10 DIAGNOSIS — C50.011 BILATERAL MALIGNANT NEOPLASM INVOLVING BOTH NIPPLE AND AREOLA IN FEMALE, UNSPECIFIED ESTROGEN RECEPTOR STATUS (HCC): Primary | ICD-10-CM

## 2020-09-10 DIAGNOSIS — C50.412 MALIGNANT NEOPLASM OF UPPER-OUTER QUADRANT OF LEFT BREAST IN FEMALE, ESTROGEN RECEPTOR NEGATIVE (HCC): ICD-10-CM

## 2020-09-10 DIAGNOSIS — C50.012 BILATERAL MALIGNANT NEOPLASM INVOLVING BOTH NIPPLE AND AREOLA IN FEMALE, UNSPECIFIED ESTROGEN RECEPTOR STATUS (HCC): Primary | ICD-10-CM

## 2020-09-10 LAB
ALBUMIN SERPL-MCNC: 4.3 GM/DL (ref 3.4–5)
ALP BLD-CCNC: 54 IU/L (ref 40–128)
ALT SERPL-CCNC: 72 U/L (ref 10–40)
ANION GAP SERPL CALCULATED.3IONS-SCNC: 13 MMOL/L (ref 4–16)
AST SERPL-CCNC: 47 IU/L (ref 15–37)
BASOPHILS ABSOLUTE: 0.1 K/CU MM
BASOPHILS RELATIVE PERCENT: 1.9 % (ref 0–1)
BILIRUB SERPL-MCNC: 0.9 MG/DL (ref 0–1)
BUN BLDV-MCNC: 22 MG/DL (ref 6–23)
CALCIUM SERPL-MCNC: 9.5 MG/DL (ref 8.3–10.6)
CHLORIDE BLD-SCNC: 101 MMOL/L (ref 99–110)
CO2: 24 MMOL/L (ref 21–32)
CREAT SERPL-MCNC: 0.9 MG/DL (ref 0.6–1.1)
DIFFERENTIAL TYPE: ABNORMAL
EOSINOPHILS ABSOLUTE: 0.2 K/CU MM
EOSINOPHILS RELATIVE PERCENT: 6.9 % (ref 0–3)
GFR AFRICAN AMERICAN: >60 ML/MIN/1.73M2
GFR NON-AFRICAN AMERICAN: >60 ML/MIN/1.73M2
GLUCOSE BLD-MCNC: 97 MG/DL (ref 70–99)
HCT VFR BLD CALC: 32 % (ref 37–47)
HEMOGLOBIN: 10.7 GM/DL (ref 12.5–16)
LYMPHOCYTES ABSOLUTE: 0.7 K/CU MM
LYMPHOCYTES RELATIVE PERCENT: 26.1 % (ref 24–44)
MCH RBC QN AUTO: 33.9 PG (ref 27–31)
MCHC RBC AUTO-ENTMCNC: 33.4 % (ref 32–36)
MCV RBC AUTO: 101.3 FL (ref 78–100)
MONOCYTES ABSOLUTE: 0.6 K/CU MM
MONOCYTES RELATIVE PERCENT: 24.5 % (ref 0–4)
PDW BLD-RTO: 17 % (ref 11.7–14.9)
PLATELET # BLD: 312 K/CU MM (ref 140–440)
PMV BLD AUTO: 9.5 FL (ref 7.5–11.1)
POTASSIUM SERPL-SCNC: 4.1 MMOL/L (ref 3.5–5.1)
RBC # BLD: 3.16 M/CU MM (ref 4.2–5.4)
SEGMENTED NEUTROPHILS ABSOLUTE COUNT: 1.1 K/CU MM
SEGMENTED NEUTROPHILS RELATIVE PERCENT: 40.6 % (ref 36–66)
SODIUM BLD-SCNC: 138 MMOL/L (ref 135–145)
TOTAL PROTEIN: 6.2 GM/DL (ref 6.4–8.2)
WBC # BLD: 2.6 K/CU MM (ref 4–10.5)

## 2020-09-10 PROCEDURE — 2580000003 HC RX 258: Performed by: INTERNAL MEDICINE

## 2020-09-10 PROCEDURE — 80053 COMPREHEN METABOLIC PANEL: CPT

## 2020-09-10 PROCEDURE — 85025 COMPLETE CBC W/AUTO DIFF WBC: CPT

## 2020-09-10 PROCEDURE — 99211 OFF/OP EST MAY X REQ PHY/QHP: CPT

## 2020-09-10 PROCEDURE — 99214 OFFICE O/P EST MOD 30 MIN: CPT | Performed by: INTERNAL MEDICINE

## 2020-09-10 PROCEDURE — 6360000002 HC RX W HCPCS: Performed by: INTERNAL MEDICINE

## 2020-09-10 RX ORDER — HEPARIN SODIUM (PORCINE) LOCK FLUSH IV SOLN 100 UNIT/ML 100 UNIT/ML
500 SOLUTION INTRAVENOUS PRN
Status: DISCONTINUED | OUTPATIENT
Start: 2020-09-10 | End: 2020-09-11 | Stop reason: HOSPADM

## 2020-09-10 RX ORDER — HEPARIN SODIUM (PORCINE) LOCK FLUSH IV SOLN 100 UNIT/ML 100 UNIT/ML
500 SOLUTION INTRAVENOUS PRN
Status: CANCELLED | OUTPATIENT
Start: 2020-09-10

## 2020-09-10 RX ORDER — SODIUM CHLORIDE 0.9 % (FLUSH) 0.9 %
10 SYRINGE (ML) INJECTION PRN
Status: CANCELLED | OUTPATIENT
Start: 2020-09-10

## 2020-09-10 RX ORDER — SODIUM CHLORIDE 0.9 % (FLUSH) 0.9 %
10 SYRINGE (ML) INJECTION PRN
Status: DISCONTINUED | OUTPATIENT
Start: 2020-09-10 | End: 2020-09-11 | Stop reason: HOSPADM

## 2020-09-10 RX ORDER — SODIUM CHLORIDE 0.9 % (FLUSH) 0.9 %
20 SYRINGE (ML) INJECTION PRN
Status: CANCELLED | OUTPATIENT
Start: 2020-09-10

## 2020-09-10 RX ADMIN — Medication 500 UNITS: at 10:15

## 2020-09-10 RX ADMIN — SODIUM CHLORIDE, PRESERVATIVE FREE 10 ML: 5 INJECTION INTRAVENOUS at 10:15

## 2020-09-10 ASSESSMENT — PATIENT HEALTH QUESTIONNAIRE - PHQ9
SUM OF ALL RESPONSES TO PHQ9 QUESTIONS 1 & 2: 0
1. LITTLE INTEREST OR PLEASURE IN DOING THINGS: 0
SUM OF ALL RESPONSES TO PHQ QUESTIONS 1-9: 0
SUM OF ALL RESPONSES TO PHQ QUESTIONS 1-9: 0
2. FEELING DOWN, DEPRESSED OR HOPELESS: 0

## 2020-09-10 ASSESSMENT — PAIN DESCRIPTION - LOCATION: LOCATION: FOOT

## 2020-09-10 ASSESSMENT — PAIN SCALES - GENERAL: PAINLEVEL_OUTOF10: 7

## 2020-09-10 NOTE — PROGRESS NOTES
0945: Pt brought back to the treatment room before her MD appt today. Pt with right chest wall port, accessed with a 20 gauge 1inch Dumas needle, + blood return. Labs drawn as ordered. CBC, CMP    Pt ambulates without issues. She reports neurapathy in her feet, reports she is going to lose toenails. She did not take off her socks/shoes for me to assess. She will discuss this with Dr. Ruthy Lo today at her visit. She reports diarrhea, up to 6 times a day. I did review the Imodium regimen. She said she took [address] today and not the Imodium. She reports she does take Imodium prn. She reports she will discuss her diarrhea concerns with Dr. Ruthy Lo at today's visit. She was dropped off for tx by a family member and will get picked up post treatment. She did not have anyone stay for her MD visit today    Per Dr. Ruthy Lo, treatment to be held today, [address] 1.1 and pt to return on Monday 9/14    Pt was already scheduled for 9/14/20 per Martin Bland, our . Pt to keep this appt and if WBC in WNL, pt will get tx. Pt given a copy of her CBC results today and AVS report. Port flushed with NS and Heparin and deaccessed.

## 2020-09-10 NOTE — PROGRESS NOTES
MA Rooming Questions  Patient: Jayne Campos  MRN: [id number]    Date: 9/10/2020        1. Do you have any new issues? Yes- feet    2. Do you need any refills on medications?    no    3. Have you had any imaging done since your last visit?   no    4. Have you been hospitalized or seen in the emergency room since your last visit here?   no    5. Did the patient have a depression screening completed today?  Yes    PHQ-9 Total Score: 0 (9/10/2020  9:39 AM)       PHQ-9 Given to (if applicable):               PHQ-9 Score (if applicable):                     [] Positive     []  Negative              Does question #9 need addressed (if applicable)                     [] Yes    []  No               Sonia Riley MA

## 2020-09-14 ENCOUNTER — HOSPITAL ENCOUNTER (OUTPATIENT)
Dept: INFUSION THERAPY | Age: 70
Discharge: HOME | End: 2020-09-14
Payer: MEDICARE

## 2020-09-14 VITALS
TEMPERATURE: 97.6 F | SYSTOLIC BLOOD PRESSURE: 142 MMHG | DIASTOLIC BLOOD PRESSURE: 67 MMHG | BODY MASS INDEX: 30.49 KG/M2 | OXYGEN SATURATION: 94 % | HEART RATE: 76 BPM | RESPIRATION RATE: 16 BRPM | WEIGHT: 178.6 LBS | HEIGHT: 64 IN

## 2020-09-14 DIAGNOSIS — C50.412 MALIGNANT NEOPLASM OF UPPER-OUTER QUADRANT OF LEFT BREAST IN FEMALE, ESTROGEN RECEPTOR NEGATIVE (HCC): ICD-10-CM

## 2020-09-14 DIAGNOSIS — C50.012 BILATERAL MALIGNANT NEOPLASM INVOLVING BOTH NIPPLE AND AREOLA IN FEMALE, UNSPECIFIED ESTROGEN RECEPTOR STATUS (HCC): Primary | ICD-10-CM

## 2020-09-14 DIAGNOSIS — Z17.1 MALIGNANT NEOPLASM OF UPPER-OUTER QUADRANT OF LEFT BREAST IN FEMALE, ESTROGEN RECEPTOR NEGATIVE (HCC): ICD-10-CM

## 2020-09-14 DIAGNOSIS — C50.011 BILATERAL MALIGNANT NEOPLASM INVOLVING BOTH NIPPLE AND AREOLA IN FEMALE, UNSPECIFIED ESTROGEN RECEPTOR STATUS (HCC): Primary | ICD-10-CM

## 2020-09-14 LAB
ALBUMIN SERPL-MCNC: 4.1 GM/DL (ref 3.4–5)
ALP BLD-CCNC: 59 IU/L (ref 40–128)
ALT SERPL-CCNC: 54 U/L (ref 10–40)
ANION GAP SERPL CALCULATED.3IONS-SCNC: 12 MMOL/L (ref 4–16)
AST SERPL-CCNC: 34 IU/L (ref 15–37)
BASOPHILS ABSOLUTE: 0.1 K/CU MM
BASOPHILS RELATIVE PERCENT: 2.7 % (ref 0–1)
BILIRUB SERPL-MCNC: 0.7 MG/DL (ref 0–1)
BUN BLDV-MCNC: 13 MG/DL (ref 6–23)
CALCIUM SERPL-MCNC: 9.9 MG/DL (ref 8.3–10.6)
CHLORIDE BLD-SCNC: 102 MMOL/L (ref 99–110)
CO2: 24 MMOL/L (ref 21–32)
CREAT SERPL-MCNC: 0.8 MG/DL (ref 0.6–1.1)
DIFFERENTIAL TYPE: ABNORMAL
EOSINOPHILS ABSOLUTE: 0.2 K/CU MM
EOSINOPHILS RELATIVE PERCENT: 5.8 % (ref 0–3)
GFR AFRICAN AMERICAN: >60 ML/MIN/1.73M2
GFR NON-AFRICAN AMERICAN: >60 ML/MIN/1.73M2
GLUCOSE BLD-MCNC: 86 MG/DL (ref 70–99)
HCT VFR BLD CALC: 31.4 % (ref 37–47)
HEMOGLOBIN: 10.5 GM/DL (ref 12.5–16)
LYMPHOCYTES ABSOLUTE: 1 K/CU MM
LYMPHOCYTES RELATIVE PERCENT: 26.6 % (ref 24–44)
MCH RBC QN AUTO: 33.5 PG (ref 27–31)
MCHC RBC AUTO-ENTMCNC: 33.4 % (ref 32–36)
MCV RBC AUTO: 100.3 FL (ref 78–100)
MONOCYTES ABSOLUTE: 1.1 K/CU MM
MONOCYTES RELATIVE PERCENT: 31 % (ref 0–4)
PDW BLD-RTO: 16.4 % (ref 11.7–14.9)
PLATELET # BLD: 278 K/CU MM (ref 140–440)
PMV BLD AUTO: 8.7 FL (ref 7.5–11.1)
POTASSIUM SERPL-SCNC: 4.4 MMOL/L (ref 3.5–5.1)
RBC # BLD: 3.13 M/CU MM (ref 4.2–5.4)
SEGMENTED NEUTROPHILS ABSOLUTE COUNT: 1.2 K/CU MM
SEGMENTED NEUTROPHILS RELATIVE PERCENT: 33.9 % (ref 36–66)
SODIUM BLD-SCNC: 138 MMOL/L (ref 135–145)
TOTAL PROTEIN: 6.1 GM/DL (ref 6.4–8.2)
WBC # BLD: 3.7 K/CU MM (ref 4–10.5)

## 2020-09-14 PROCEDURE — 36591 DRAW BLOOD OFF VENOUS DEVICE: CPT

## 2020-09-14 PROCEDURE — 85025 COMPLETE CBC W/AUTO DIFF WBC: CPT

## 2020-09-14 PROCEDURE — 2580000003 HC RX 258: Performed by: INTERNAL MEDICINE

## 2020-09-14 PROCEDURE — 80053 COMPREHEN METABOLIC PANEL: CPT

## 2020-09-14 PROCEDURE — 6360000002 HC RX W HCPCS

## 2020-09-14 RX ORDER — HEPARIN SODIUM (PORCINE) LOCK FLUSH IV SOLN 100 UNIT/ML 100 UNIT/ML
500 SOLUTION INTRAVENOUS PRN
Status: CANCELLED | OUTPATIENT
Start: 2020-09-14

## 2020-09-14 RX ORDER — SODIUM CHLORIDE 0.9 % (FLUSH) 0.9 %
10 SYRINGE (ML) INJECTION PRN
Status: CANCELLED | OUTPATIENT
Start: 2020-09-14

## 2020-09-14 RX ORDER — SODIUM CHLORIDE 0.9 % (FLUSH) 0.9 %
10 SYRINGE (ML) INJECTION PRN
Status: DISCONTINUED | OUTPATIENT
Start: 2020-09-14 | End: 2020-09-15 | Stop reason: HOSPADM

## 2020-09-14 RX ORDER — HEPARIN SODIUM (PORCINE) LOCK FLUSH IV SOLN 100 UNIT/ML 100 UNIT/ML
SOLUTION INTRAVENOUS
Status: COMPLETED
Start: 2020-09-14 | End: 2020-09-14

## 2020-09-14 RX ORDER — SODIUM CHLORIDE 0.9 % (FLUSH) 0.9 %
20 SYRINGE (ML) INJECTION PRN
Status: CANCELLED | OUTPATIENT
Start: 2020-09-14

## 2020-09-14 RX ORDER — HEPARIN SODIUM (PORCINE) LOCK FLUSH IV SOLN 100 UNIT/ML 100 UNIT/ML
500 SOLUTION INTRAVENOUS PRN
Status: DISCONTINUED | OUTPATIENT
Start: 2020-09-14 | End: 2020-09-15 | Stop reason: HOSPADM

## 2020-09-14 RX ADMIN — HEPARIN SODIUM (PORCINE) LOCK FLUSH IV SOLN 100 UNIT/ML 500 UNITS: 100 SOLUTION at 11:15

## 2020-09-14 RX ADMIN — SODIUM CHLORIDE, PRESERVATIVE FREE 10 ML: 5 INJECTION INTRAVENOUS at 11:15

## 2020-09-14 RX ADMIN — Medication 500 UNITS: at 11:15

## 2020-09-21 ENCOUNTER — HOSPITAL ENCOUNTER (OUTPATIENT)
Dept: INFUSION THERAPY | Age: 70
Discharge: HOME | End: 2020-09-21
Payer: MEDICARE

## 2020-09-21 VITALS
HEART RATE: 69 BPM | WEIGHT: 181.4 LBS | BODY MASS INDEX: 30.97 KG/M2 | OXYGEN SATURATION: 96 % | SYSTOLIC BLOOD PRESSURE: 163 MMHG | TEMPERATURE: 97.2 F | RESPIRATION RATE: 18 BRPM | DIASTOLIC BLOOD PRESSURE: 76 MMHG | HEIGHT: 64 IN

## 2020-09-21 DIAGNOSIS — C50.012 BILATERAL MALIGNANT NEOPLASM INVOLVING BOTH NIPPLE AND AREOLA IN FEMALE, UNSPECIFIED ESTROGEN RECEPTOR STATUS (HCC): Primary | ICD-10-CM

## 2020-09-21 DIAGNOSIS — Z17.1 MALIGNANT NEOPLASM OF UPPER-OUTER QUADRANT OF LEFT BREAST IN FEMALE, ESTROGEN RECEPTOR NEGATIVE (HCC): ICD-10-CM

## 2020-09-21 DIAGNOSIS — C50.412 MALIGNANT NEOPLASM OF UPPER-OUTER QUADRANT OF LEFT BREAST IN FEMALE, ESTROGEN RECEPTOR NEGATIVE (HCC): ICD-10-CM

## 2020-09-21 DIAGNOSIS — C50.011 BILATERAL MALIGNANT NEOPLASM INVOLVING BOTH NIPPLE AND AREOLA IN FEMALE, UNSPECIFIED ESTROGEN RECEPTOR STATUS (HCC): Primary | ICD-10-CM

## 2020-09-21 LAB
BASOPHILS ABSOLUTE: 0.1 K/CU MM
BASOPHILS RELATIVE PERCENT: 1.8 % (ref 0–1)
DIFFERENTIAL TYPE: ABNORMAL
EOSINOPHILS ABSOLUTE: 0.3 K/CU MM
EOSINOPHILS RELATIVE PERCENT: 3.4 % (ref 0–3)
HCT VFR BLD CALC: 32.5 % (ref 37–47)
HEMOGLOBIN: 10.8 GM/DL (ref 12.5–16)
LYMPHOCYTES ABSOLUTE: 1.5 K/CU MM
LYMPHOCYTES RELATIVE PERCENT: 19.1 % (ref 24–44)
MCH RBC QN AUTO: 33.5 PG (ref 27–31)
MCHC RBC AUTO-ENTMCNC: 33.2 % (ref 32–36)
MCV RBC AUTO: 100.9 FL (ref 78–100)
MONOCYTES ABSOLUTE: 0.9 K/CU MM
MONOCYTES RELATIVE PERCENT: 12 % (ref 0–4)
PDW BLD-RTO: 16.4 % (ref 11.7–14.9)
PLATELET # BLD: 295 K/CU MM (ref 140–440)
PMV BLD AUTO: 9 FL (ref 7.5–11.1)
RBC # BLD: 3.22 M/CU MM (ref 4.2–5.4)
SEGMENTED NEUTROPHILS ABSOLUTE COUNT: 4.9 K/CU MM
SEGMENTED NEUTROPHILS RELATIVE PERCENT: 63.7 % (ref 36–66)
WBC # BLD: 7.7 K/CU MM (ref 4–10.5)

## 2020-09-21 PROCEDURE — 6360000002 HC RX W HCPCS: Performed by: INTERNAL MEDICINE

## 2020-09-21 PROCEDURE — 96413 CHEMO IV INFUSION 1 HR: CPT

## 2020-09-21 PROCEDURE — 2500000003 HC RX 250 WO HCPCS: Performed by: INTERNAL MEDICINE

## 2020-09-21 PROCEDURE — 85025 COMPLETE CBC W/AUTO DIFF WBC: CPT

## 2020-09-21 PROCEDURE — 96374 THER/PROPH/DIAG INJ IV PUSH: CPT

## 2020-09-21 PROCEDURE — 2580000003 HC RX 258: Performed by: INTERNAL MEDICINE

## 2020-09-21 PROCEDURE — 96375 TX/PRO/DX INJ NEW DRUG ADDON: CPT

## 2020-09-21 RX ORDER — DIPHENHYDRAMINE HYDROCHLORIDE 50 MG/ML
25 INJECTION INTRAMUSCULAR; INTRAVENOUS ONCE
Status: COMPLETED | OUTPATIENT
Start: 2020-09-21 | End: 2020-09-21

## 2020-09-21 RX ORDER — SODIUM CHLORIDE 9 MG/ML
20 INJECTION, SOLUTION INTRAVENOUS ONCE
Status: DISCONTINUED | OUTPATIENT
Start: 2020-09-21 | End: 2020-09-22 | Stop reason: HOSPADM

## 2020-09-21 RX ORDER — HEPARIN SODIUM (PORCINE) LOCK FLUSH IV SOLN 100 UNIT/ML 100 UNIT/ML
500 SOLUTION INTRAVENOUS PRN
Status: DISCONTINUED | OUTPATIENT
Start: 2020-09-21 | End: 2020-09-22 | Stop reason: HOSPADM

## 2020-09-21 RX ADMIN — DIPHENHYDRAMINE HYDROCHLORIDE 25 MG: 50 INJECTION INTRAMUSCULAR; INTRAVENOUS at 11:13

## 2020-09-21 RX ADMIN — PACLITAXEL 150 MG: 6 INJECTION, SOLUTION, CONCENTRATE INTRAVENOUS at 11:50

## 2020-09-21 RX ADMIN — Medication 500 UNITS: at 13:12

## 2020-09-21 RX ADMIN — FAMOTIDINE 20 MG: 10 INJECTION, SOLUTION INTRAVENOUS at 11:13

## 2020-09-21 RX ADMIN — SODIUM CHLORIDE 20 ML/HR: 9 INJECTION, SOLUTION INTRAVENOUS at 11:14

## 2020-09-21 RX ADMIN — DEXAMETHASONE SODIUM PHOSPHATE: 10 INJECTION INTRAMUSCULAR; INTRAVENOUS at 11:14

## 2020-09-21 NOTE — PROGRESS NOTES
Ambulated to infusion area. Here for chemo. States doing well. Peripheral neuropathy has improved. Labs drawn. CBC results reviewed with patient. Chemo administered as ordered. Tolerated well.

## 2020-09-23 ENCOUNTER — HOSPITAL ENCOUNTER (OUTPATIENT)
Dept: RADIATION ONCOLOGY | Age: 70
Discharge: HOME | End: 2020-09-23
Payer: MEDICARE

## 2020-09-23 VITALS
BODY MASS INDEX: 31.24 KG/M2 | HEART RATE: 64 BPM | TEMPERATURE: 97 F | SYSTOLIC BLOOD PRESSURE: 157 MMHG | HEIGHT: 64 IN | RESPIRATION RATE: 17 BRPM | WEIGHT: 183 LBS | OXYGEN SATURATION: 98 % | DIASTOLIC BLOOD PRESSURE: 72 MMHG

## 2020-09-23 PROCEDURE — 99205 OFFICE O/P NEW HI 60 MIN: CPT | Performed by: RADIOLOGY

## 2020-09-23 PROCEDURE — 99203 OFFICE O/P NEW LOW 30 MIN: CPT

## 2020-09-23 ASSESSMENT — PAIN SCALES - GENERAL: PAINLEVEL_OUTOF10: 0

## 2020-09-23 NOTE — PROGRESS NOTES
Keo Castellano  9/23/2020    CONSULT / Left Breast Cancer    Vitals:    09/23/20 0839   BP: (!) 157/72   Pulse: 64   Resp: 17   Temp: 97 °F (36.1 °C)   SpO2: 98%        Wt Readings from Last 3 Encounters:   09/23/20 183 lb (83 kg)   09/21/20 181 lb 6.4 oz (82.3 kg)   09/14/20 178 lb 9.6 oz (81 kg)        Pain Assessment  Pain Assessment: 0-10  Pain Level: 0  Patient's Stated Pain Goal: No pain  Multiple Pain Sites: No  Denies Need for Intervention    Fall Risk:    Fall risk- Neuropathy   Impaired/Unsteady Gait and Re-Evaluate Weekly    Nutritional Alteration:  None  No Difficulties Swallowing  Voices Sufficient Oral Intake    Mediport: yes, Right Chest    Pacemaker/ICD: no    Other Implants: no    Previous XRT: no    Assessment: Pt states had follow up with Cardiologist and during his exam palpated a large mass in left breast, advising pt get immeadiate follow up with PCP. Pt states followed up with abnormal mammogram and positive biopsy in Feb 2020. Pt had first lumpectomy 3/20/20 and re excision ith SN biopsy on 3/30/20 per . Incision to left breast and axilla, pt reports had an infection and slower healing process but both incisions healed well at this time. Pt reports occasional sharp pains in left breast but does not require pain medication, as it goes away on its own. Pt currently getting chemo, states 4 Taxol treatments left. Pt here to discuss Radiation options with .     Past Medical History:   Diagnosis Date    Cancer [facility])     left breast cancer\"found after bx 2/25/2020\" following with Dr Chelly Carreno Colonic polyp 2009    Hx of motion sickness     Hyperlipidemia 5/2009    Hypertension     \"on medication 5+ yrs \" follow with dr Brianna Mar MI (myocardial infarction)     \"they said my ekg showed I  had heart attack over 6 yrs ago but never knew I had one\"    PONV (postoperative nausea and vomiting)     hx of motion sickness    Wears glasses        Past Surgical History:   Procedure Laterality Date    BREAST BIOPSY Left 2020    BREAST BIOPSY Left 2020    sentinal node, partial mastectomy left    BREAST LUMPECTOMY Left 3/20/2020    LEFT BREAST LUMPECTOMY WITH NEEDLE LOC AND SENTINEL NODE BIOPSY performed by Frederico Ormond, MD at [address] Left 3/30/2020    BREAST LESION RE EXCISION LEFT LUMPECTOMY SITE performed by Frederico Ormond, MD at [address] TEST  2009    treadmill, Dr. Prince Arrow Right     right     SECTION      COLONOSCOPY          COLONOSCOPY  2015    diverticulosis, internal hemorroids.  repeat 5 years. [place] PORT SURGERY Right 2020    PORT SURGERY Right 3/20/2020    PORT INSERTION performed by Frederico Ormond, MD at [facility] Right 2016    TUBAL LIGATION         Allergies   Allergen Reactions    Codeine Hives and Swelling     \"tylenol with codeine is what I had trouble with\"           Current Outpatient Medications:     dexamethasone (DECADRON) 2 MG tablet, TAKE 4 TABLETS BY MOUTH BEFORE FIRST TREATMENT WITH TAXOL AND THEN TAKE 1 TABLET BY MOUTH ON DAY 2 AND DAY 3 OF WEEKLY TAXOL., Disp: , Rfl:     Calcium Carb-Cholecalciferol (CALTRATE 600+D) 600-800 MG-UNIT TABS, 1 tab twice per day, Disp: 180 tablet, Rfl: 3    atenolol (TENORMIN) 100 MG tablet, Take 1 tablet by mouth daily, Disp: 90 tablet, Rfl: 3    atorvastatin (LIPITOR) 10 MG tablet, Take 1 tablet by mouth daily, Disp: 90 tablet, Rfl: 3    triamcinolone (KENALOG) 0.1 % cream, , Disp: , Rfl:     nitroGLYCERIN (NITROSTAT) 0.3 MG SL tablet, Place 1 tablet under the tongue every 5 minutes as needed for Chest pain, Disp: 25 tablet, Rfl: 0    clobetasol (OLUX) 0.05 % foam, , Disp: , Rfl:     ketoconazole (NIZORAL) 2 % shampoo, , Disp: , Rfl:     losartan (COZAAR) 50 MG tablet, Take 1 tablet by mouth daily, Disp: , Rfl:     aspirin 81 MG tablet, Take 81 mg by mouth daily. , Disp: , Rfl:     Multiple Vitamin (MULTIVITAMIN PO), Take  by mouth daily. , Disp: , Rfl:     ADDITIONAL COMMENTS: Pt with no visitor, states .     Electronically signed by Madison Elizabeth RN on 9/23/2020 at 8:46 AM

## 2020-09-23 NOTE — CONSULTS
Radiation Oncology Consultation  Encounter Date: 2020 9:05 AM    Ms. Chelly Shelton is a 78 y.o. female  : 1950  MRN: [id number]  [facility]  Requesting Provider: No att. providers found        CONSULTANT: Nicko Hassan    PHYSICIANS:   Primary Care: Toro Harrington MD   Medical Oncologist: Curly Kaiser M.D.   [facility] [address]    Sur[facility] 2nd-floor [facility], [address] [place] [address]    DIAGNOSIS: Left breast cancer      Cancer Staging  Malignant neoplasm of upper-outer quadrant of left breast in female, estrogen receptor negative ([facility] [address]  Staging form: Breast, AJCC 8th Edition  - Pathologic stage from 3/9/2020: Stage IIA (pT2, pN0, cM0, G3, ER-, SD-, HER2-) - Signed by Curly Kaiser MD on 2020         TREATMENT COURSE:  Oncology History   Malignant neoplasm of upper-outer quadrant of left breast in female, estrogen receptor negative ([facility] [address]    Initial Diagnosis    Malignant neoplasm of upper-outer quadrant of left breast in female, estrogen receptor negative ([facility] [address]     3/20/2020 Surgery    Left breast lumpectomy with sentinel node sampling. Subsequent reexcision with negative pathology     2020 -  Chemotherapy    Dose dense doxorubicin/cyclophosphamide followed by weekly paclitaxel           HPI:   Today I had the privilege of seeing Chelly Shelton in consultation. As you recall, Chelly Shelton is a 78 y.o. female who was recently found to have an invasive left-sided breast cancer. She initially presented back in February of this year after the breast mass was incidentally found while undergoing an echo. She then underwent breast imaging-see below confirming a 3.3 cm highly suspicious mass at the 2:00 position of the left breast.  Needle biopsy was performed on 20, confirming at least a 12 mm grade 3 invasive ductal carcinoma Receptor negative.   On 3/20/20 she underwent lumpectomy with sentinel node sampling showing a grade 3 2.8 cm invasive ductal carcinoma with positive margins. The sentinel lymph node was benign. She then underwent a reexcision on 3/30/20 showing benign findings. She was then initiated on adjuvant chemotherapy consisting of dose dense AC ×4 followed by weekly Taxol, currently completing 8 out of 12 She presents today for consideration of her treatment options. She reports that presentation, she was asymptomatic. She denies any associated breast pain, nipple discharge, arm swelling, fevers, chills, or drenching night sweats. She has not noticed any new lumps or bumps anywhere else throughout her body. She denies the new onset of bony pain prior to diagnosis. Currently, she does report some paresthesias and myalgias/arthralgias which she attributes to her chemotherapy. She also reports fatigue, but states overall she is tolerating chemotherapy fairly well. She has had some delayed cycles due to low blood counts.       Past Medical History:   Diagnosis Date    Cancer [facility])     left breast cancer\"found after bx 2/25/2020\" following with Dr Kriss Molina Colonic polyp 2009    Hx of motion sickness     Hyperlipidemia 5/2009    Hypertension     \"on medication 5+ yrs \" follow with dr Philip Steiner (myocardial infarction)     \"they said my ekg showed I  had heart attack over 6 yrs ago but never knew I had one\"    PONV (postoperative nausea and vomiting)     hx of motion sickness    Wears glasses         Past Surgical History:   Procedure Laterality Date    BREAST BIOPSY Left 02/25/2020    BREAST BIOPSY Left 03/20/2020    sentinal node, partial mastectomy left    BREAST LUMPECTOMY Left 3/20/2020    LEFT BREAST LUMPECTOMY WITH NEEDLE LOC AND SENTINEL NODE BIOPSY performed by Frederico Ormond, MD at [address] [place] [address] Left 3/30/2020    BREAST LESION RE EXCISION LEFT LUMPECTOMY SITE performed by Frederico Ormond, MD at [address] [place] [address]  clobetasol (OLUX) 0.05 % foam       ketoconazole (NIZORAL) 2 % shampoo       losartan (COZAAR) 50 MG tablet Take 1 tablet by mouth daily      aspirin 81 MG tablet Take 81 mg by mouth daily.  Multiple Vitamin (MULTIVITAMIN PO) Take  by mouth daily. LABORATORY STUDIES:   CBC:   Lab Results   Component Value Date    WBC 7.7 09/21/2020    RBC 3.22 09/21/2020    HGB 10.8 09/21/2020    HCT 32.5 09/21/2020    .9 09/21/2020    MCH 33.5 09/21/2020    MCHC 33.2 09/21/2020    RDW 16.4 09/21/2020     09/21/2020    MPV 9.0 09/21/2020     CMP:  Lab Results   Component Value Date     09/14/2020    K 4.4 09/14/2020     09/14/2020    CO2 24 09/14/2020    BUN 13 09/14/2020    CREATININE 0.8 09/14/2020    GFRAA >60 09/14/2020    GFRAA >60 05/22/2012    AGRATIO 1.9 01/29/2020    LABGLOM >60 09/14/2020    GLUCOSE 86 09/14/2020    PROT 6.1 09/14/2020    PROT 7.0 05/22/2012    LABALBU 4.1 09/14/2020    CALCIUM 9.9 09/14/2020    BILITOT 0.7 09/14/2020    ALKPHOS 59 09/14/2020    AST 34 09/14/2020    ALT 54 09/14/2020     Onc labs: No results found for: PSA, CEA, LDH, AFP    PATHOLOGY:   3/24/2020 11:25 AM - [facility] Incoming Lab Results From [facility] (Epic Adt)     Narrative   Performed by: Wes Parmar   Specimen #[id number]       Final Pathologic Diagnosis:   A.  Lymph node, left sentinel nodule, biopsy:   -  One benign reactive lymph node, negative for malignancy   (0/1) (see comment, see stains report). Manas Braun, left, lumpectomy:   -  INVASIVE DUCTAL CARCINOMA WITH EXTENSIVE NECROSIS, GRADE   3, 2.8 CM. IN GREATEST DIMENSION (see comment, see stains   report).     -  SURGICAL MARGINS ARE POSITIVE FOR MALIGNANCY.     -  Prior biopsy site is identified.       Electronically Signed Out By Gearold Lennox.  Amanda Hui MD   Comment:   INVASIVE CARCINOMA OF THE BREAST:     Prior Specimen: [id number]   Procedure: Lumpectomy with sentinel node biopsy   Specimen Laterality and tumor site: Left breast, 2 o'clock   (per prior biopsy report)   Tumor Size: 2.8 cm. in greatest dimension   Histologic Type: Invasive ductal carcinoma with extensive   necrosis   Histologic Grade (Vanesa Histologic Score): Grade 3        - Glandular score 3, Nuclear score 3, Mitotic score 3   (>25/10 hfp)   Tumor Focality: Single focus   Ductal Carcinoma In Situ: Not present   Lobular Carcinoma In Situ (LCIS): Not present   Tumor Extension:          - Skin: Not applicable        - Nipple: Not applicable        - Skeletal Muscle: Not applicable   Margins:          - Invasive carcinoma margins: Margins positive.  The   anterior and medial margins are positive for malignancy. Invasive carcinoma is located less than 1 mm (0.4 mm) away   from the inferior margin.  Tumor cells are also noted to be   present on the inked surface of the inferior margin, which   is interpreted as carry over from a contaminated scalpel. However, given these findings, and close proximity of   carcinoma to the inferior margin, re-excision of the   inferior margin may be beneficial, if the anterior and   medial margins, which are positive for malignancy, are   re-excised.  The next closest negative margin to the tumor   is the posterior margin which is located 6 mm.  away from the   carcinoma.           Regional Lymph Nodes:        - Number of Lymph Nodes Examined: (1 sentinel, 0   nonsentinel)        - Number of [place] Nodes Examined: 1        - Number of Lymph Nodes with Macrometastases (>2 mm): 0        - Number of Lymph Nodes with Micrometastases (>0.2 mm   to 2 mm and/or >200 cells): 0        - Number of Lymph Nodes with Isolated Tumor Cells (d0.2   mm and d200 cells): 0   Treatment Effect                  - Treatment Effect in the Breast: No known prior   pre-surgical therapy        - Treatment Effect in the Lymph Nodes: No known prior   pre-surgical therapy   Lymphovascular Invasion: Not identified   Dermal Lymphovascular Invasion: Not applicable Microcalcifications: Not identified   Additional Pathologic Findings: Prior biopsy site   Ancillary Studies:  ER/PgR/Her2 results from prior biopsy   specimen are as follows:        - ER by IHC: Negative (0% staining)        - PgR by IHC: Negative (0% staining)        - Her2 by IHC: Equivocal (score 2+)        - Her2 by FISH: Negative        - Average no. of of HER2 signals/nucleus: 3.18, Average   no. of CEN17 signals/nucleus: 1.9, HER2/CEN17 Ratio: 1.67   Pathologic Stage Classification: pT2, N0, Mx          RADIOLOGIC STUDIES:       Results for orders placed during the hospital encounter of 02/19/20   US BREAST LIMITED LEFT    Narrative EXAMINATION:  DIAGNOSTIC DIGITAL BILATERAL BREASTS MAMMOGRAM WITH TOMOSYNTHESIS; TARGETED  ULTRASOUND OF THE LEFT BREAST    2/19/2020    TECHNIQUE:  Diagnostic mammography of the bilateral breasts was performed with  tomosynthesis. 2D standard and 3D tomosynthesis combination imaging  performed through both breasts. Computer aided detection was utilized in the  interpretation of this exam. Targeted ultrasound of the left breast was  performed. VIEWS: Bilateral CC, MLO, and ML views along spot compression CC and MLO  views of the left breast    COMPARISON:  01/08/2018    HISTORY:  Palpable left breast lump. FINDINGS:    Mammogram:    There are scattered areas of fibroglandular density. Left breast:  Corresponding to the palpable lump, there is a dumbbell-shaped  mass at 2-3 o'clock at middle to posterior depth with associated pleomorphic  calcifications. Right breast:  No new dominant mass, suspicious microcalcifications, or other  abnormality seen. Ultrasound:    Targeted ultrasound at 2 o'clock at a distance of 7 cm from the nipple  demonstrates a dumbbell-shaped hypoechoic mass with angular margins that  measures 3.3 x 2.5 x 2.1 cm. Internal vascularity noted along with some  posterior acoustic shadowing. No suspicious axillary lymph nodes.       Impression are clear. No  evidence of noncalcified nodule. Soft Tissues/Bones: No suspicious lytic or blastic osseous lesion. Abdomen/Pelvis:    Organs: The liver is fatty infiltrated. The gallbladder, spleen, pancreas,  and adrenal glands are normal.  The right kidney demonstrates nonobstructing  mid to inferior pole calculus measuring approximately 4.3 mm. A right renal  mid pole cyst measures 8.6 mm. The left kidney demonstrates an exophytic mid  pole cyst measuring up to 5.9 mm. There is a nonobstructing mid pole  calculus measuring 3 mm. Additional smaller left renal cysts noted. GI/Bowel: There are no features of bowel obstruction. The appendix is normal.    Pelvis: The bladder and rectum are normal.    Peritoneum/Retroperitoneum: No intraperitoneal free air or free fluid. No  evidence of mesenteric or retroperitoneal lymphadenopathy. Bones/Soft Tissues: No suspicious lytic or blastic osseous lesion. Impression 1. No acute intrathoracic, abdominal or pelvic abnormality. 2. Nonobstructing bilateral renal calculi and bilateral renal cysts. REVIEW OF SYSTEMS:   Constitutional:  Patient denies fevers, rigors, or drenching night sweats. The patient's weight and appetite have been stable. Eyes:  The patient denies any recent visual changes, diplopia, or cataracts. She does wear glasses. ENMT:  The patient denies any ear pain, hearing changes, or nosebleeds  Respiratory:  The patient denies any SOB, hemoptysis, or green sputum production  Cardiovascular: The patient denies any chest pain,or fainting spells. She reports occasional ankle edema after her chemotherapy. GI:  Patient denies nausea, vomiting,  melena, or hematochezia. She reports diarrhea with her chemotherapy. : Patient denies dysuria, hematuria, or urinary incontinence.   Integumentary: patient denies skin rashes, pruritis, or arm edema  Endocrine:  Patient denies any diabetic or thyroid problems  Neurologic: Patient denies headaches, focal weakness, or disorientation    PHYSICAL EXAMINATION:   VITAL SIGNS: BP (!) 157/72   Pulse 64   Temp 97 °F (36.1 °C) (Tympanic)   Resp 17   Ht 5' 4\" (1.626 m)   Wt 183 lb (83 kg)   LMP  (LMP Unknown)   SpO2 98%   BMI 31.41 kg/m²   ECOG PERFORMANCE STATUS: 0  PAIN RATIN    GENERAL: Pleasant well-developed adult in no acute distress. Alert and oriented ×3  SKIN: Warm and dry, without jaundice, ecchymoses, or petechiae. HEENT: Normocephalic, atraumatic. PERRLA, Sclerae anicteric, oral mucosa moist without lesion or exudate in the visible oral cavity or oropharynx,  NECK:  No JVD; Supple. No cervical, supraclavicular, or infraclavicular lymphadenopathy is palpable. A Mediport is palpable in the right infra clavicular region. LUNGS: Bilaterally clear to auscultation. No rales, rhonci, or wheezing are present. Good inspiratory effort, no accessory muscle use. CARDIAC: Regular rate and rhythm, without murmurs, clicks, or gallops   Breast examination: The left breast has a healing surgical incision laterally with mild tissue deficit. On palpation no masses are present. The right breast is without nipple discharge, masses, or areas nodularity. No axillary lymphadenopathy is palpable. ABDOMEN: Normoactive bowels sounds are present. Soft. Non-tender and non-distended. No hepatosplenomegaly or masses are present. EXTREMITIES: No cyanosis, clubbing or edema is present. FROM  NEUROLOGIC: Gait unremarkable. The patient is alert and oriented. CN II-XII are grossly intact. Sensation to light touch is intact and symmetric in the fingers and feet. Muscle strength is 5/5 in both the upper and lower extremities. IMPRESSION/PLAN:  Brayan Hdz is a 78 y.o. female who was recently found to have a stage IIa triple receptor negative invasive left-sided breast cancer status post breast conserving surgery, currently on adjuvant chemotherapy.   I have reviewed the patient's radiographic images. The treatment options were discussed in detail with the patient. I do recommend adjuvant left breast radiation therapy. The potential acute side effects and chronic complications of radiation therapy to the left breast were discussed in detail with the patient. The patient voiced understanding, and the patient's questions were answered. The patient has decided to proceed with radiation therapy. I will schedule a simulation to occur at the next available time and will plan on initiating radiation therapy shortly thereafter. Thank-you for allowing me to participate in the care of this very pleasant patient.           Electronically signed by Electronically signed by Julian Gray MD on 9/23/2020 at 9:05 AM

## 2020-09-23 NOTE — PLAN OF CARE
Radiation education and handouts given. Side effects and management reviewed with pt. All questions answered and pt voices understanding . Nursing Care Plan for Breast Radiation    Pain related to cancer diagnosis and treatment. Interventions   Pain assessment. Monitor pharmacological pain medication. Teach relaxation and repositioning techniques. Expected Outcome   Maintain patient's acceptable pain level or <5 on pain scale. Knowledge deficit related to diagnosis and treatment plan. Interventions   Assess patient's ability to comprehend diagnosis and treatment plan. Provide educational materials and teaching regarding plan of care. Provide emotional support and continued education. Refer to psychosocial coordinator if further assistance needed. Expected Outcome   Patient voices understanding of diagnosis and treatment plan. Altered respiratory status related to diagnosis and treatment. Interventions   Assess respiratory status, note changes. Educate patient on symptoms to report to staff. Refer to smoking cessation program if needed. Expected Outcome   No respiratory complications, patient with SPO2 >90% or equal to baseline. Altered skin integrity related to treatment. Interventions   Evaluation of skin integrity at therapy site. Advise patient on appropriate skin care. Instruct patient on recommended lotions/ointments/creams/dressing changes to use on therapy site. Expected Outcome   Minimize adverse skin reaction/infection at treatment site. To re-evaluate weekly during OTV and one month post radiation treatments.

## 2020-09-28 ENCOUNTER — HOSPITAL ENCOUNTER (OUTPATIENT)
Dept: INFUSION THERAPY | Age: 70
Discharge: HOME | End: 2020-09-28
Payer: MEDICARE

## 2020-09-28 VITALS
HEART RATE: 75 BPM | DIASTOLIC BLOOD PRESSURE: 67 MMHG | SYSTOLIC BLOOD PRESSURE: 129 MMHG | OXYGEN SATURATION: 96 % | HEIGHT: 64 IN | WEIGHT: 179.2 LBS | BODY MASS INDEX: 30.59 KG/M2 | RESPIRATION RATE: 16 BRPM | TEMPERATURE: 97.4 F

## 2020-09-28 DIAGNOSIS — C50.412 MALIGNANT NEOPLASM OF UPPER-OUTER QUADRANT OF LEFT BREAST IN FEMALE, ESTROGEN RECEPTOR NEGATIVE (HCC): ICD-10-CM

## 2020-09-28 DIAGNOSIS — C50.011 BILATERAL MALIGNANT NEOPLASM INVOLVING BOTH NIPPLE AND AREOLA IN FEMALE, UNSPECIFIED ESTROGEN RECEPTOR STATUS (HCC): Primary | ICD-10-CM

## 2020-09-28 DIAGNOSIS — Z17.1 MALIGNANT NEOPLASM OF UPPER-OUTER QUADRANT OF LEFT BREAST IN FEMALE, ESTROGEN RECEPTOR NEGATIVE (HCC): ICD-10-CM

## 2020-09-28 DIAGNOSIS — C50.012 BILATERAL MALIGNANT NEOPLASM INVOLVING BOTH NIPPLE AND AREOLA IN FEMALE, UNSPECIFIED ESTROGEN RECEPTOR STATUS (HCC): Primary | ICD-10-CM

## 2020-09-28 LAB
ALBUMIN SERPL-MCNC: 4.3 GM/DL (ref 3.4–5)
ALP BLD-CCNC: 61 IU/L (ref 40–128)
ALT SERPL-CCNC: 62 U/L (ref 10–40)
ANION GAP SERPL CALCULATED.3IONS-SCNC: 9 MMOL/L (ref 4–16)
AST SERPL-CCNC: 38 IU/L (ref 15–37)
BASOPHILS ABSOLUTE: 0.1 K/CU MM
BASOPHILS RELATIVE PERCENT: 1.1 % (ref 0–1)
BILIRUB SERPL-MCNC: 0.9 MG/DL (ref 0–1)
BUN BLDV-MCNC: 15 MG/DL (ref 6–23)
CALCIUM SERPL-MCNC: 10.2 MG/DL (ref 8.3–10.6)
CHLORIDE BLD-SCNC: 103 MMOL/L (ref 99–110)
CO2: 26 MMOL/L (ref 21–32)
CREAT SERPL-MCNC: 0.8 MG/DL (ref 0.6–1.1)
DIFFERENTIAL TYPE: ABNORMAL
EOSINOPHILS ABSOLUTE: 0.5 K/CU MM
EOSINOPHILS RELATIVE PERCENT: 9.5 % (ref 0–3)
GFR AFRICAN AMERICAN: >60 ML/MIN/1.73M2
GFR NON-AFRICAN AMERICAN: >60 ML/MIN/1.73M2
GLUCOSE BLD-MCNC: 88 MG/DL (ref 70–99)
HCT VFR BLD CALC: 32.3 % (ref 37–47)
HEMOGLOBIN: 10.8 GM/DL (ref 12.5–16)
LYMPHOCYTES ABSOLUTE: 1.2 K/CU MM
LYMPHOCYTES RELATIVE PERCENT: 20.3 % (ref 24–44)
MCH RBC QN AUTO: 33.4 PG (ref 27–31)
MCHC RBC AUTO-ENTMCNC: 33.4 % (ref 32–36)
MCV RBC AUTO: 100 FL (ref 78–100)
MONOCYTES ABSOLUTE: 0.3 K/CU MM
MONOCYTES RELATIVE PERCENT: 6 % (ref 0–4)
PDW BLD-RTO: 15.7 % (ref 11.7–14.9)
PLATELET # BLD: 315 K/CU MM (ref 140–440)
PMV BLD AUTO: 9.8 FL (ref 7.5–11.1)
POTASSIUM SERPL-SCNC: 4.5 MMOL/L (ref 3.5–5.1)
RBC # BLD: 3.23 M/CU MM (ref 4.2–5.4)
SEGMENTED NEUTROPHILS ABSOLUTE COUNT: 3.6 K/CU MM
SEGMENTED NEUTROPHILS RELATIVE PERCENT: 63.1 % (ref 36–66)
SODIUM BLD-SCNC: 138 MMOL/L (ref 135–145)
TOTAL PROTEIN: 6.1 GM/DL (ref 6.4–8.2)
WBC # BLD: 5.7 K/CU MM (ref 4–10.5)

## 2020-09-28 PROCEDURE — 6360000002 HC RX W HCPCS: Performed by: INTERNAL MEDICINE

## 2020-09-28 PROCEDURE — 96375 TX/PRO/DX INJ NEW DRUG ADDON: CPT

## 2020-09-28 PROCEDURE — 85025 COMPLETE CBC W/AUTO DIFF WBC: CPT

## 2020-09-28 PROCEDURE — 2500000003 HC RX 250 WO HCPCS: Performed by: INTERNAL MEDICINE

## 2020-09-28 PROCEDURE — 2580000003 HC RX 258: Performed by: INTERNAL MEDICINE

## 2020-09-28 PROCEDURE — 96413 CHEMO IV INFUSION 1 HR: CPT

## 2020-09-28 PROCEDURE — 36591 DRAW BLOOD OFF VENOUS DEVICE: CPT

## 2020-09-28 PROCEDURE — 80053 COMPREHEN METABOLIC PANEL: CPT

## 2020-09-28 RX ORDER — SODIUM CHLORIDE 0.9 % (FLUSH) 0.9 %
10 SYRINGE (ML) INJECTION PRN
Status: CANCELLED | OUTPATIENT
Start: 2020-10-12

## 2020-09-28 RX ORDER — SODIUM CHLORIDE 0.9 % (FLUSH) 0.9 %
5 SYRINGE (ML) INJECTION PRN
Status: CANCELLED | OUTPATIENT
Start: 2020-09-28

## 2020-09-28 RX ORDER — MEPERIDINE HYDROCHLORIDE 50 MG/ML
12.5 INJECTION INTRAMUSCULAR; INTRAVENOUS; SUBCUTANEOUS ONCE
Status: CANCELLED | OUTPATIENT
Start: 2020-10-12

## 2020-09-28 RX ORDER — HEPARIN SODIUM (PORCINE) LOCK FLUSH IV SOLN 100 UNIT/ML 100 UNIT/ML
500 SOLUTION INTRAVENOUS PRN
Status: CANCELLED | OUTPATIENT
Start: 2020-10-05

## 2020-09-28 RX ORDER — SODIUM CHLORIDE 9 MG/ML
20 INJECTION, SOLUTION INTRAVENOUS ONCE
Status: CANCELLED | OUTPATIENT
Start: 2020-10-19

## 2020-09-28 RX ORDER — METHYLPREDNISOLONE SODIUM SUCCINATE 125 MG/2ML
125 INJECTION, POWDER, LYOPHILIZED, FOR SOLUTION INTRAMUSCULAR; INTRAVENOUS ONCE
Status: CANCELLED | OUTPATIENT
Start: 2020-09-28

## 2020-09-28 RX ORDER — SODIUM CHLORIDE 9 MG/ML
20 INJECTION, SOLUTION INTRAVENOUS ONCE
Status: CANCELLED | OUTPATIENT
Start: 2020-09-28

## 2020-09-28 RX ORDER — MEPERIDINE HYDROCHLORIDE 50 MG/ML
12.5 INJECTION INTRAMUSCULAR; INTRAVENOUS; SUBCUTANEOUS ONCE
Status: CANCELLED | OUTPATIENT
Start: 2020-10-05

## 2020-09-28 RX ORDER — SODIUM CHLORIDE 9 MG/ML
20 INJECTION, SOLUTION INTRAVENOUS ONCE
Status: DISCONTINUED | OUTPATIENT
Start: 2020-09-28 | End: 2020-09-29 | Stop reason: HOSPADM

## 2020-09-28 RX ORDER — HEPARIN SODIUM (PORCINE) LOCK FLUSH IV SOLN 100 UNIT/ML 100 UNIT/ML
500 SOLUTION INTRAVENOUS PRN
Status: CANCELLED | OUTPATIENT
Start: 2020-09-28

## 2020-09-28 RX ORDER — METHYLPREDNISOLONE SODIUM SUCCINATE 125 MG/2ML
125 INJECTION, POWDER, LYOPHILIZED, FOR SOLUTION INTRAMUSCULAR; INTRAVENOUS ONCE
Status: CANCELLED | OUTPATIENT
Start: 2020-10-12

## 2020-09-28 RX ORDER — HEPARIN SODIUM (PORCINE) LOCK FLUSH IV SOLN 100 UNIT/ML 100 UNIT/ML
500 SOLUTION INTRAVENOUS PRN
Status: CANCELLED | OUTPATIENT
Start: 2020-10-19

## 2020-09-28 RX ORDER — DIPHENHYDRAMINE HYDROCHLORIDE 50 MG/ML
50 INJECTION INTRAMUSCULAR; INTRAVENOUS ONCE
Status: CANCELLED | OUTPATIENT
Start: 2020-10-19

## 2020-09-28 RX ORDER — MEPERIDINE HYDROCHLORIDE 50 MG/ML
12.5 INJECTION INTRAMUSCULAR; INTRAVENOUS; SUBCUTANEOUS ONCE
Status: CANCELLED | OUTPATIENT
Start: 2020-10-19

## 2020-09-28 RX ORDER — DIPHENHYDRAMINE HYDROCHLORIDE 50 MG/ML
25 INJECTION INTRAMUSCULAR; INTRAVENOUS ONCE
Status: CANCELLED | OUTPATIENT
Start: 2020-10-05

## 2020-09-28 RX ORDER — DIPHENHYDRAMINE HYDROCHLORIDE 50 MG/ML
50 INJECTION INTRAMUSCULAR; INTRAVENOUS ONCE
Status: CANCELLED | OUTPATIENT
Start: 2020-10-05

## 2020-09-28 RX ORDER — HEPARIN SODIUM (PORCINE) LOCK FLUSH IV SOLN 100 UNIT/ML 100 UNIT/ML
500 SOLUTION INTRAVENOUS PRN
Status: DISCONTINUED | OUTPATIENT
Start: 2020-09-28 | End: 2020-09-29 | Stop reason: HOSPADM

## 2020-09-28 RX ORDER — MEPERIDINE HYDROCHLORIDE 50 MG/ML
12.5 INJECTION INTRAMUSCULAR; INTRAVENOUS; SUBCUTANEOUS ONCE
Status: CANCELLED | OUTPATIENT
Start: 2020-09-28

## 2020-09-28 RX ORDER — SODIUM CHLORIDE 0.9 % (FLUSH) 0.9 %
10 SYRINGE (ML) INJECTION PRN
Status: CANCELLED | OUTPATIENT
Start: 2020-09-28

## 2020-09-28 RX ORDER — HEPARIN SODIUM (PORCINE) LOCK FLUSH IV SOLN 100 UNIT/ML 100 UNIT/ML
500 SOLUTION INTRAVENOUS PRN
Status: CANCELLED | OUTPATIENT
Start: 2020-10-12

## 2020-09-28 RX ORDER — SODIUM CHLORIDE 0.9 % (FLUSH) 0.9 %
5 SYRINGE (ML) INJECTION PRN
Status: CANCELLED | OUTPATIENT
Start: 2020-10-19

## 2020-09-28 RX ORDER — SODIUM CHLORIDE 9 MG/ML
INJECTION, SOLUTION INTRAVENOUS CONTINUOUS
Status: CANCELLED | OUTPATIENT
Start: 2020-09-28

## 2020-09-28 RX ORDER — SODIUM CHLORIDE 9 MG/ML
INJECTION, SOLUTION INTRAVENOUS CONTINUOUS
Status: CANCELLED | OUTPATIENT
Start: 2020-10-05

## 2020-09-28 RX ORDER — SODIUM CHLORIDE 9 MG/ML
INJECTION, SOLUTION INTRAVENOUS CONTINUOUS
Status: CANCELLED | OUTPATIENT
Start: 2020-10-12

## 2020-09-28 RX ORDER — DIPHENHYDRAMINE HYDROCHLORIDE 50 MG/ML
25 INJECTION INTRAMUSCULAR; INTRAVENOUS ONCE
Status: CANCELLED | OUTPATIENT
Start: 2020-10-12

## 2020-09-28 RX ORDER — SODIUM CHLORIDE 0.9 % (FLUSH) 0.9 %
5 SYRINGE (ML) INJECTION PRN
Status: CANCELLED | OUTPATIENT
Start: 2020-10-12

## 2020-09-28 RX ORDER — EPINEPHRINE 1 MG/ML
0.3 INJECTION, SOLUTION, CONCENTRATE INTRAVENOUS PRN
Status: CANCELLED | OUTPATIENT
Start: 2020-09-28

## 2020-09-28 RX ORDER — SODIUM CHLORIDE 9 MG/ML
20 INJECTION, SOLUTION INTRAVENOUS ONCE
Status: CANCELLED | OUTPATIENT
Start: 2020-10-05

## 2020-09-28 RX ORDER — METHYLPREDNISOLONE SODIUM SUCCINATE 125 MG/2ML
125 INJECTION, POWDER, LYOPHILIZED, FOR SOLUTION INTRAMUSCULAR; INTRAVENOUS ONCE
Status: CANCELLED | OUTPATIENT
Start: 2020-10-05

## 2020-09-28 RX ORDER — EPINEPHRINE 1 MG/ML
0.3 INJECTION, SOLUTION, CONCENTRATE INTRAVENOUS PRN
Status: CANCELLED | OUTPATIENT
Start: 2020-10-05

## 2020-09-28 RX ORDER — EPINEPHRINE 1 MG/ML
0.3 INJECTION, SOLUTION, CONCENTRATE INTRAVENOUS PRN
Status: CANCELLED | OUTPATIENT
Start: 2020-10-12

## 2020-09-28 RX ORDER — SODIUM CHLORIDE 9 MG/ML
INJECTION, SOLUTION INTRAVENOUS CONTINUOUS
Status: CANCELLED | OUTPATIENT
Start: 2020-10-19

## 2020-09-28 RX ORDER — DIPHENHYDRAMINE HYDROCHLORIDE 50 MG/ML
25 INJECTION INTRAMUSCULAR; INTRAVENOUS ONCE
Status: CANCELLED | OUTPATIENT
Start: 2020-09-28

## 2020-09-28 RX ORDER — SODIUM CHLORIDE 0.9 % (FLUSH) 0.9 %
5 SYRINGE (ML) INJECTION PRN
Status: CANCELLED | OUTPATIENT
Start: 2020-10-05

## 2020-09-28 RX ORDER — DIPHENHYDRAMINE HYDROCHLORIDE 50 MG/ML
50 INJECTION INTRAMUSCULAR; INTRAVENOUS ONCE
Status: CANCELLED | OUTPATIENT
Start: 2020-10-12

## 2020-09-28 RX ORDER — METHYLPREDNISOLONE SODIUM SUCCINATE 125 MG/2ML
125 INJECTION, POWDER, LYOPHILIZED, FOR SOLUTION INTRAMUSCULAR; INTRAVENOUS ONCE
Status: CANCELLED | OUTPATIENT
Start: 2020-10-19

## 2020-09-28 RX ORDER — SODIUM CHLORIDE 0.9 % (FLUSH) 0.9 %
10 SYRINGE (ML) INJECTION PRN
Status: DISCONTINUED | OUTPATIENT
Start: 2020-09-28 | End: 2020-09-29 | Stop reason: HOSPADM

## 2020-09-28 RX ORDER — DIPHENHYDRAMINE HYDROCHLORIDE 50 MG/ML
50 INJECTION INTRAMUSCULAR; INTRAVENOUS ONCE
Status: CANCELLED | OUTPATIENT
Start: 2020-09-28

## 2020-09-28 RX ORDER — EPINEPHRINE 1 MG/ML
0.3 INJECTION, SOLUTION, CONCENTRATE INTRAVENOUS PRN
Status: CANCELLED | OUTPATIENT
Start: 2020-10-19

## 2020-09-28 RX ORDER — DIPHENHYDRAMINE HYDROCHLORIDE 50 MG/ML
25 INJECTION INTRAMUSCULAR; INTRAVENOUS ONCE
Status: CANCELLED | OUTPATIENT
Start: 2020-10-19

## 2020-09-28 RX ORDER — SODIUM CHLORIDE 0.9 % (FLUSH) 0.9 %
10 SYRINGE (ML) INJECTION PRN
Status: CANCELLED | OUTPATIENT
Start: 2020-10-19

## 2020-09-28 RX ORDER — SODIUM CHLORIDE 0.9 % (FLUSH) 0.9 %
10 SYRINGE (ML) INJECTION PRN
Status: CANCELLED | OUTPATIENT
Start: 2020-10-05

## 2020-09-28 RX ORDER — DIPHENHYDRAMINE HYDROCHLORIDE 50 MG/ML
25 INJECTION INTRAMUSCULAR; INTRAVENOUS ONCE
Status: COMPLETED | OUTPATIENT
Start: 2020-09-28 | End: 2020-09-28

## 2020-09-28 RX ORDER — SODIUM CHLORIDE 9 MG/ML
20 INJECTION, SOLUTION INTRAVENOUS ONCE
Status: CANCELLED | OUTPATIENT
Start: 2020-10-12

## 2020-09-28 RX ADMIN — FAMOTIDINE 20 MG: 10 INJECTION, SOLUTION INTRAVENOUS at 10:12

## 2020-09-28 RX ADMIN — DIPHENHYDRAMINE HYDROCHLORIDE 25 MG: 50 INJECTION INTRAMUSCULAR; INTRAVENOUS at 10:12

## 2020-09-28 RX ADMIN — SODIUM CHLORIDE 20 ML/HR: 9 INJECTION, SOLUTION INTRAVENOUS at 10:12

## 2020-09-28 RX ADMIN — Medication 500 UNITS: at 11:50

## 2020-09-28 RX ADMIN — SODIUM CHLORIDE, PRESERVATIVE FREE 10 ML: 5 INJECTION INTRAVENOUS at 11:50

## 2020-09-28 RX ADMIN — PACLITAXEL 150 MG: 6 INJECTION, SOLUTION, CONCENTRATE INTRAVENOUS at 10:39

## 2020-09-28 RX ADMIN — DEXAMETHASONE SODIUM PHOSPHATE: 10 INJECTION INTRAMUSCULAR; INTRAVENOUS at 10:12

## 2020-09-28 NOTE — PROGRESS NOTES
Patient arrived to treatment suite for blood draw, pre-medications, and chemotherapy infusion. Right chest mediport accessed and blood drawn from site and sent to lab for processing. Patient states tingling in fingers and toes, but no other questions or concerns for the doctor at this time. Treatment approved and given. Patient tolerated well. Patient's status assessed and documented appropriately. All labs and required results were also reviewed today. Treatment parameters have been reviewed. Today's treatment has been approved by the provider. Treatment orders and medication sequencing (when applicable) was verified by 2 registered nurses. The treatment plan was confirmed with the patient prior to administration, and the patient understands the need to report any treatment-related symptoms. Prior to administration, when applicable, the following 8 elements of medication administration were reviewed with 2nd Registered Nurse prior to dosing: drug name, drug dose, infusion volume when prepared in a syringe, rate of administration, expiration dates and/or times, appearance and integrity of drug(s), and rate of pump for infusion. The 5 rights of medication administration have been verified.

## 2020-09-28 NOTE — PROGRESS NOTES
Gilford Arthurs is here for cancer of the left  Breast.  Initial size 2.8 cm. Nodes resected 1. Numbers positive: 0. Stage: T2 N0 M0. (Stage: IIA)   Procedure: lumpectomy w sentinel node biopsy Date: 3/20/2020   Hormonal Therapy:no  Chemotherapy: having now  Radiation Therapy: not yet  [facility]  ONCOTYPE  ER-/WI-/HER2-    Current Evaluation:   Today the patient is basically feeling well. She denies new breast mass, nipple discharge, or breast pain. No new subcutaneous mass, headache, bone pain, new cough, or abdominal pain.      Physical Exam:   Generally healthy appearing,  female   Skin: no new subcutaneous mass   Breast: non-tender, no new mass   Lungs clear   Heart RRR  Lymphadenopathy: no new axillary and supraclavicular   Abdomen: non-tender without liver, spleen, kidney, or mass palpable      Impression:   No evidence of systemic or recurrent breast cancer:   No evidence of second primary breast cancer     Plan: RTC 3 mo  Will need a mammogram in Feb 2021

## 2020-09-29 ENCOUNTER — OFFICE VISIT (OUTPATIENT)
Dept: SURGERY | Age: 70
End: 2020-09-29
Payer: MEDICARE

## 2020-09-29 VITALS
RESPIRATION RATE: 14 BRPM | WEIGHT: 180.4 LBS | BODY MASS INDEX: 30.97 KG/M2 | HEART RATE: 78 BPM | SYSTOLIC BLOOD PRESSURE: 157 MMHG | DIASTOLIC BLOOD PRESSURE: 69 MMHG | TEMPERATURE: 97 F

## 2020-09-29 PROCEDURE — 99213 OFFICE O/P EST LOW 20 MIN: CPT | Performed by: SURGERY

## 2020-09-30 ENCOUNTER — HOSPITAL ENCOUNTER (OUTPATIENT)
Dept: INFUSION THERAPY | Age: 70
Discharge: HOME | End: 2020-09-30
Payer: MEDICARE

## 2020-09-30 ENCOUNTER — OFFICE VISIT (OUTPATIENT)
Dept: ONCOLOGY | Age: 70
End: 2020-09-30
Payer: MEDICARE

## 2020-09-30 VITALS
DIASTOLIC BLOOD PRESSURE: 79 MMHG | TEMPERATURE: 96.9 F | HEART RATE: 59 BPM | OXYGEN SATURATION: 95 % | HEIGHT: 64 IN | BODY MASS INDEX: 30.59 KG/M2 | SYSTOLIC BLOOD PRESSURE: 146 MMHG | WEIGHT: 179.2 LBS

## 2020-09-30 PROCEDURE — 99211 OFF/OP EST MAY X REQ PHY/QHP: CPT

## 2020-09-30 PROCEDURE — 99213 OFFICE O/P EST LOW 20 MIN: CPT | Performed by: NURSE PRACTITIONER

## 2020-09-30 ASSESSMENT — PATIENT HEALTH QUESTIONNAIRE - PHQ9
2. FEELING DOWN, DEPRESSED OR HOPELESS: 0
SUM OF ALL RESPONSES TO PHQ9 QUESTIONS 1 & 2: 0
SUM OF ALL RESPONSES TO PHQ QUESTIONS 1-9: 0
SUM OF ALL RESPONSES TO PHQ QUESTIONS 1-9: 0
1. LITTLE INTEREST OR PLEASURE IN DOING THINGS: 0

## 2020-09-30 NOTE — PROGRESS NOTES
Patient Name: Jose M Flannery  Patient : 1950  Patient MRN: [id number]     Primary Oncologist: Edgardo Hannah MD  Referring Provider: Gregoria Arguello MD     Date of Service: 2020      Chief Complaint:   No chief complaint on file. Patient Active Problem List:     Malignant neoplasm of female breast      Postoperative nausea     Neutropenic fever      Alopecia (capitis) totalis    HPI:   Ms. Susana Sommers is a 78year old very pleasant female with medical history significant for hypertension, hyperlipidemia, coronary artery disease, referred to me on 2020 for evaluation of his clinical stage IIA left breast, high grade, triple negative, invasive ductal carcinoma. She stated that she has echocardiogram and stress test on 2020. It showed normal EF (EF 51%). Concentric left ventricular hypertrophy and she has mild to moderate mitral regurfitation. She was incidentally noted to have left breast mass. She was subsequently evaluated by Dr. Gabbi Robbins and she requested diagnostic mammogram. She underwent diagnostic digital bilateral mammogram on 2020 and it showed dumbbell-shaped mass at 2-3 o'clock at middle to posterior depth with associated pleomorphic calcifications, in her left breast. Targeted ultrasound at 2 o'clock at a distance of 7 cm from the nipple demonstrates a dumbbell-shaped hypoechoic mass with angular margins that measures 3.3 x 2.5 x 2.1 cm. Internal vascularity noted along with some posterior acoustic shadowing. No suspicious axillary lymph nodes. She underwent ultrasound guided core biopsy of left breast and clip marker placement on 2020 and final pathology showed invasive ductal carcinoma. Tumor size is at least 12 mm and it is a high grade tumor. ER by IHC is negative (0%), PgR by IHC is negative (0%), Her2 by IHC is equivocal (score 2+) and Her2 by FISH is negative.      Since she is found to have clinical stage IIA left breast invasive ductal carcinoma, she was subsequently referred to me for further evaluation. She was seen by Dr. Agueda Campbell and she underwent left breast lumpectomy and sentinel lymph node biopsy on March 20, 2020. Final pathology showed stage IIA INVASIVE DUCTAL CARCINOMA WITH EXTENSIVE NECROSIS, GRADE 3, 2.8 CM. IN GREATEST DIMENSION. SURGICAL MARGINS ARE POSITIVE FOR MALIGNANCY. Ductal Carcinoma In Situ and lobular Carcinoma In Situ are not present. One sentinel lymph node examined was negative for metastasis. No lymphovascular or dermal lymphovascular invasion. Pathologic Stage Classification: pT2, N0, Mx. Repeat surgery for positive margins was done on March 30, 2020 and there was no residual cancer seen. Since she is found to have stage IIA left breast triple negative invasive ductal carcinoma, I recommend for adjuvant chemotherapy and radiation therapy. She had Echocardiogram on 2/2020 and her EF was 51%. Dose dense chemotherapy with doxorubicin/cyclophosphamide, followed by Paclitaxel was started on May 4, 2020. On September 30, 2020, she presented to me for follow-up. I have been following her for stage IIA left breast triple negative invasive ductal carcinoma and she is status post left breast lumpectomy and sentinel lymph node biopsy. She is currently on adjuvant chemotherapy with dose dense doxorubicin and cyclophosphamide, followed by weekly paclitaxel since May 4, 2020. She is status post nine weekly paclitaxel and she is tolerating current chemotherapy well. She does not encounter any major side effects from the chemotherapy. She has chemo induced nail changes and she most likely loose some of her nail. She also has some neuropathy to her bilateral feet. She did not want to consider dose change or medications. Otherwise, I recommend that he continue with current chemotherapy and I will set her for her next chemotherapy on next week.   We will continue to follow her closely during texture, turgor and no jaundice.  appears intact   EXTREMITIES: no LE edema     Labs:  Hematology:  Lab Results   Component Value Date    WBC 5.7 09/28/2020    RBC 3.23 (L) 09/28/2020    HGB 10.8 (L) 09/28/2020    HCT 32.3 (L) 09/28/2020    .0 09/28/2020    MCH 33.4 (H) 09/28/2020    MCHC 33.4 09/28/2020    RDW 15.7 (H) 09/28/2020     09/28/2020    MPV 9.8 09/28/2020    BANDSPCT 31 (H) 06/13/2020    SEGSPCT 63.1 09/28/2020    EOSRELPCT 9.5 (H) 09/28/2020    BASOPCT 1.1 (H) 09/28/2020    LYMPHOPCT 20.3 (L) 09/28/2020    MONOPCT 6.0 (H) 09/28/2020    BANDABS 10.20 06/13/2020    SEGSABS 3.6 09/28/2020    EOSABS 0.5 09/28/2020    BASOSABS 0.1 09/28/2020    LYMPHSABS 1.2 09/28/2020    MONOSABS 0.3 09/28/2020    DIFFTYPE AUTOMATED DIFFERENTIAL 09/28/2020    ANISOCYTOSIS 1+ 06/13/2020    POLYCHROM 1+ 06/13/2020    WBCMORP OCCASIONAL 06/10/2020    PLTM FEW 06/13/2020     No results found for: ESR  Chemistry:  Lab Results   Component Value Date     09/28/2020    K 4.5 09/28/2020     09/28/2020    CO2 26 09/28/2020    BUN 15 09/28/2020    CREATININE 0.8 09/28/2020    GLUCOSE 88 09/28/2020    CALCIUM 10.2 09/28/2020    PROT 6.1 (L) 09/28/2020    LABALBU 4.3 09/28/2020    BILITOT 0.9 09/28/2020    ALKPHOS 61 09/28/2020    AST 38 (H) 09/28/2020    ALT 62 (H) 09/28/2020    LABGLOM >60 09/28/2020    GFRAA >60 09/28/2020    AGRATIO 1.9 01/29/2020    GLOB 2.3 01/29/2020    MG 1.6 (L) 06/12/2020     No results found for: MMA, LDH, HOMOCYSTEINE  No components found for: LD  Lab Results   Component Value Date    T4FREE 1.0 02/07/2019     Immunology:  Lab Results   Component Value Date    PROT 6.1 (L) 09/28/2020     No results found for: Micheal Davies, KLFLCR  No results found for: B2M  Coagulation Panel:  No results found for: PROTIME, INR, APTT, DDIMER  Anemia Panel:  No results found for: [id number], FOLATE  Tumor Markers:  Lab Results   Component Value Date    LABCA2 28.6 04/30/2020 Observations:  No data recorded        Assessment & Plan:   Clinical stage IIA left breast, high grade, triple negative, invasive ductal carcinoma    PLAN  Ms. Peggy Rodriguez is a 78year old very pleasant female who was incidentally found to have left breast mass on Echocardiogram done on February 11, 2020. Further work ups with diagnostic digital mammogram and targeted sonogram of left breast revealed that she has about 3.3 cm mass in her left breast upper outer quadrant. She subsequently had ultrasound guided biopsy of the left breast mass on February 25, 2020 and final pathology revealed clinical stage IIA high grade, triple negative, left breast invasive ductal carcinoma. She was seen by Dr. Alexandra Hager and she underwent left breast lumpectomy and sentinel lymph node biopsy on March 20, 2020. Final pathology showed stage IIA INVASIVE DUCTAL CARCINOMA WITH EXTENSIVE NECROSIS, GRADE 3, 2.8 CM. IN GREATEST DIMENSION. SURGICAL MARGINS ARE POSITIVE FOR MALIGNANCY. Ductal Carcinoma In Situ and lobular Carcinoma In Situ are not present. One sentinel lymph node examined was negative for metastasis. No lymphovascular or dermal lymphovascular invasion. Pathologic Stage Classification: pT2, N0, Mx. Repeat surgery for positive margins was done on March 30, 2020 and there was no residual cancer seen. Since she is found to have stage IIA left breast triple negative invasive ductal carcinoma, I recommend for adjuvant chemotherapy and radiation therapy. She had Echocardiogram on 2/2020 and her EF was 51%. Dose dense chemotherapy with doxorubicin/cyclophosphamide, followed by Paclitaxel was started on May 4, 2020. On September 30, 2020, she presented to me for follow-up. I have been following her for stage IIA left breast triple negative invasive ductal carcinoma and she is status post left breast lumpectomy and sentinel lymph node biopsy.   She is currently on adjuvant chemotherapy with dose dense doxorubicin and cyclophosphamide, followed by weekly paclitaxel since May 4, 2020. She is status post nine weekly paclitaxel and she is tolerating current chemotherapy well. She does not encounter any major side effects from the chemotherapy. She has chemo induced nail changes and she most likely lose some of her nail. She has bilateral lower extremity neuropathy. She did not want to consider dose reduction or medication    Otherwise, I recommend that he continue with current chemotherapy and I will set her for her next chemotherapy on next week. We will continue to follow her closely during chemotherapy. I answered all her questions and concerns for today. I recommend her to follow-up with primary care physician, Dr. Alex Damon on regular basis and I will continue to keep you updated on her progress. Thank you for allowing me to participate in the care of this very pleasant patient. Recent imaging and labs were reviewed and discussed with the patient.

## 2020-09-30 NOTE — PROGRESS NOTES
MA Rooming Questions  Patient: Nate Barnett  MRN: [id number]    Date: 9/30/2020        1. Do you have any new issues?   no         2. Do you need any refills on medications?    no    3. Have you had any imaging done since your last visit?   no    4. Have you been hospitalized or seen in the emergency room since your last visit here?   no    5. Did the patient have a depression screening completed today?  Yes    PHQ-9 Total Score: 0 (9/30/2020 10:38 AM)       PHQ-9 Given to (if applicable):               PHQ-9 Score (if applicable):                     [] Positive     []  Negative              Does question #9 need addressed (if applicable)                     [] Yes    []  No               Miranda Rowley

## 2020-10-05 ENCOUNTER — HOSPITAL ENCOUNTER (OUTPATIENT)
Dept: INFUSION THERAPY | Age: 70
Discharge: HOME | End: 2020-10-05
Payer: MEDICARE

## 2020-10-05 VITALS
SYSTOLIC BLOOD PRESSURE: 151 MMHG | BODY MASS INDEX: 30.9 KG/M2 | HEART RATE: 66 BPM | DIASTOLIC BLOOD PRESSURE: 71 MMHG | OXYGEN SATURATION: 98 % | WEIGHT: 180 LBS | TEMPERATURE: 97 F

## 2020-10-05 DIAGNOSIS — C50.012 BILATERAL MALIGNANT NEOPLASM INVOLVING BOTH NIPPLE AND AREOLA IN FEMALE, UNSPECIFIED ESTROGEN RECEPTOR STATUS (HCC): Primary | ICD-10-CM

## 2020-10-05 DIAGNOSIS — C50.412 MALIGNANT NEOPLASM OF UPPER-OUTER QUADRANT OF LEFT BREAST IN FEMALE, ESTROGEN RECEPTOR NEGATIVE (HCC): ICD-10-CM

## 2020-10-05 DIAGNOSIS — Z17.1 MALIGNANT NEOPLASM OF UPPER-OUTER QUADRANT OF LEFT BREAST IN FEMALE, ESTROGEN RECEPTOR NEGATIVE (HCC): ICD-10-CM

## 2020-10-05 DIAGNOSIS — C50.011 BILATERAL MALIGNANT NEOPLASM INVOLVING BOTH NIPPLE AND AREOLA IN FEMALE, UNSPECIFIED ESTROGEN RECEPTOR STATUS (HCC): Primary | ICD-10-CM

## 2020-10-05 LAB
BASOPHILS ABSOLUTE: 0.1 K/CU MM
BASOPHILS RELATIVE PERCENT: 2.9 % (ref 0–1)
DIFFERENTIAL TYPE: ABNORMAL
EOSINOPHILS ABSOLUTE: 0.3 K/CU MM
EOSINOPHILS RELATIVE PERCENT: 9.5 % (ref 0–3)
HCT VFR BLD CALC: 31.9 % (ref 37–47)
HEMOGLOBIN: 10.5 GM/DL (ref 12.5–16)
LYMPHOCYTES ABSOLUTE: 1 K/CU MM
LYMPHOCYTES RELATIVE PERCENT: 32.4 % (ref 24–44)
MCH RBC QN AUTO: 33.1 PG (ref 27–31)
MCHC RBC AUTO-ENTMCNC: 32.9 % (ref 32–36)
MCV RBC AUTO: 100.6 FL (ref 78–100)
MONOCYTES ABSOLUTE: 0.3 K/CU MM
MONOCYTES RELATIVE PERCENT: 8.3 % (ref 0–4)
PDW BLD-RTO: 15.8 % (ref 11.7–14.9)
PLATELET # BLD: 319 K/CU MM (ref 140–440)
PMV BLD AUTO: 9.7 FL (ref 7.5–11.1)
RBC # BLD: 3.17 M/CU MM (ref 4.2–5.4)
SEGMENTED NEUTROPHILS ABSOLUTE COUNT: 1.5 K/CU MM
SEGMENTED NEUTROPHILS RELATIVE PERCENT: 46.9 % (ref 36–66)
WBC # BLD: 3.2 K/CU MM (ref 4–10.5)

## 2020-10-05 PROCEDURE — 96375 TX/PRO/DX INJ NEW DRUG ADDON: CPT

## 2020-10-05 PROCEDURE — 2580000003 HC RX 258: Performed by: INTERNAL MEDICINE

## 2020-10-05 PROCEDURE — 6360000002 HC RX W HCPCS: Performed by: INTERNAL MEDICINE

## 2020-10-05 PROCEDURE — 96413 CHEMO IV INFUSION 1 HR: CPT

## 2020-10-05 PROCEDURE — 2500000003 HC RX 250 WO HCPCS: Performed by: INTERNAL MEDICINE

## 2020-10-05 PROCEDURE — 85025 COMPLETE CBC W/AUTO DIFF WBC: CPT

## 2020-10-05 PROCEDURE — 36591 DRAW BLOOD OFF VENOUS DEVICE: CPT

## 2020-10-05 RX ORDER — SODIUM CHLORIDE 0.9 % (FLUSH) 0.9 %
10 SYRINGE (ML) INJECTION PRN
Status: DISCONTINUED | OUTPATIENT
Start: 2020-10-05 | End: 2020-10-06 | Stop reason: HOSPADM

## 2020-10-05 RX ORDER — DIPHENHYDRAMINE HYDROCHLORIDE 50 MG/ML
25 INJECTION INTRAMUSCULAR; INTRAVENOUS ONCE
Status: COMPLETED | OUTPATIENT
Start: 2020-10-05 | End: 2020-10-05

## 2020-10-05 RX ORDER — HEPARIN SODIUM (PORCINE) LOCK FLUSH IV SOLN 100 UNIT/ML 100 UNIT/ML
500 SOLUTION INTRAVENOUS PRN
Status: DISCONTINUED | OUTPATIENT
Start: 2020-10-05 | End: 2020-10-06 | Stop reason: HOSPADM

## 2020-10-05 RX ORDER — SODIUM CHLORIDE 9 MG/ML
20 INJECTION, SOLUTION INTRAVENOUS ONCE
Status: DISCONTINUED | OUTPATIENT
Start: 2020-10-05 | End: 2020-10-06 | Stop reason: HOSPADM

## 2020-10-05 RX ADMIN — SODIUM CHLORIDE, PRESERVATIVE FREE 10 ML: 5 INJECTION INTRAVENOUS at 12:38

## 2020-10-05 RX ADMIN — SODIUM CHLORIDE 20 ML/HR: 9 INJECTION, SOLUTION INTRAVENOUS at 10:47

## 2020-10-05 RX ADMIN — PACLITAXEL 150 MG: 6 INJECTION, SOLUTION, CONCENTRATE INTRAVENOUS at 11:28

## 2020-10-05 RX ADMIN — FAMOTIDINE 20 MG: 10 INJECTION, SOLUTION INTRAVENOUS at 10:51

## 2020-10-05 RX ADMIN — Medication 500 UNITS: at 12:38

## 2020-10-05 RX ADMIN — DEXAMETHASONE SODIUM PHOSPHATE: 10 INJECTION INTRAMUSCULAR; INTRAVENOUS at 10:54

## 2020-10-05 RX ADMIN — DIPHENHYDRAMINE HYDROCHLORIDE 25 MG: 50 INJECTION INTRAMUSCULAR; INTRAVENOUS at 10:53

## 2020-10-05 ASSESSMENT — PAIN SCALES - GENERAL: PAINLEVEL_OUTOF10: 6

## 2020-10-05 ASSESSMENT — PAIN DESCRIPTION - LOCATION: LOCATION: FOOT

## 2020-10-05 NOTE — PROGRESS NOTES
Here for weekly Taxol. Pt voiced no new/worsening symptoms. Continues to have peripheral neuropathy, voiced its tolerable. Taking Imodium for chronic diarrhea. Voiced no other needs or issues at this time. Right chest mediport accessed, + blood return noted, specimen obtained. Labs WDL for treatment. Pt agreeable to POC. Treatment administered without incident. Reviewed AVS, copy given to patient. Discharged in stable condition.

## 2020-10-12 ENCOUNTER — HOSPITAL ENCOUNTER (OUTPATIENT)
Dept: INFUSION THERAPY | Age: 70
Discharge: HOME | End: 2020-10-12
Payer: MEDICARE

## 2020-10-12 VITALS
WEIGHT: 179.4 LBS | BODY MASS INDEX: 30.63 KG/M2 | OXYGEN SATURATION: 96 % | SYSTOLIC BLOOD PRESSURE: 146 MMHG | HEIGHT: 64 IN | TEMPERATURE: 97 F | RESPIRATION RATE: 16 BRPM | HEART RATE: 76 BPM | DIASTOLIC BLOOD PRESSURE: 71 MMHG

## 2020-10-12 DIAGNOSIS — Z17.1 MALIGNANT NEOPLASM OF UPPER-OUTER QUADRANT OF LEFT BREAST IN FEMALE, ESTROGEN RECEPTOR NEGATIVE (HCC): ICD-10-CM

## 2020-10-12 DIAGNOSIS — C50.412 MALIGNANT NEOPLASM OF UPPER-OUTER QUADRANT OF LEFT BREAST IN FEMALE, ESTROGEN RECEPTOR NEGATIVE (HCC): ICD-10-CM

## 2020-10-12 DIAGNOSIS — C50.011 BILATERAL MALIGNANT NEOPLASM INVOLVING BOTH NIPPLE AND AREOLA IN FEMALE, UNSPECIFIED ESTROGEN RECEPTOR STATUS (HCC): Primary | ICD-10-CM

## 2020-10-12 DIAGNOSIS — C50.012 BILATERAL MALIGNANT NEOPLASM INVOLVING BOTH NIPPLE AND AREOLA IN FEMALE, UNSPECIFIED ESTROGEN RECEPTOR STATUS (HCC): Primary | ICD-10-CM

## 2020-10-12 LAB
BASOPHILS ABSOLUTE: 0.1 K/CU MM
BASOPHILS RELATIVE PERCENT: 3 % (ref 0–1)
DIFFERENTIAL TYPE: ABNORMAL
EOSINOPHILS ABSOLUTE: 0.2 K/CU MM
EOSINOPHILS RELATIVE PERCENT: 5.4 % (ref 0–3)
HCT VFR BLD CALC: 30.9 % (ref 37–47)
HEMOGLOBIN: 10.4 GM/DL (ref 12.5–16)
LYMPHOCYTES ABSOLUTE: 0.9 K/CU MM
LYMPHOCYTES RELATIVE PERCENT: 31.2 % (ref 24–44)
MCH RBC QN AUTO: 33.4 PG (ref 27–31)
MCHC RBC AUTO-ENTMCNC: 33.7 % (ref 32–36)
MCV RBC AUTO: 99.4 FL (ref 78–100)
MONOCYTES ABSOLUTE: 0.3 K/CU MM
MONOCYTES RELATIVE PERCENT: 9.7 % (ref 0–4)
PDW BLD-RTO: 15.8 % (ref 11.7–14.9)
PLATELET # BLD: 344 K/CU MM (ref 140–440)
PMV BLD AUTO: 9.6 FL (ref 7.5–11.1)
RBC # BLD: 3.11 M/CU MM (ref 4.2–5.4)
SEGMENTED NEUTROPHILS ABSOLUTE COUNT: 1.5 K/CU MM
SEGMENTED NEUTROPHILS RELATIVE PERCENT: 50.7 % (ref 36–66)
WBC # BLD: 3 K/CU MM (ref 4–10.5)

## 2020-10-12 PROCEDURE — 96367 TX/PROPH/DG ADDL SEQ IV INF: CPT

## 2020-10-12 PROCEDURE — 96375 TX/PRO/DX INJ NEW DRUG ADDON: CPT

## 2020-10-12 PROCEDURE — 36591 DRAW BLOOD OFF VENOUS DEVICE: CPT

## 2020-10-12 PROCEDURE — 96413 CHEMO IV INFUSION 1 HR: CPT

## 2020-10-12 PROCEDURE — 2500000003 HC RX 250 WO HCPCS: Performed by: INTERNAL MEDICINE

## 2020-10-12 PROCEDURE — 6360000002 HC RX W HCPCS: Performed by: INTERNAL MEDICINE

## 2020-10-12 PROCEDURE — 85025 COMPLETE CBC W/AUTO DIFF WBC: CPT

## 2020-10-12 PROCEDURE — 2580000003 HC RX 258: Performed by: INTERNAL MEDICINE

## 2020-10-12 PROCEDURE — 96374 THER/PROPH/DIAG INJ IV PUSH: CPT

## 2020-10-12 RX ORDER — DIPHENHYDRAMINE HYDROCHLORIDE 50 MG/ML
25 INJECTION INTRAMUSCULAR; INTRAVENOUS ONCE
Status: COMPLETED | OUTPATIENT
Start: 2020-10-12 | End: 2020-10-12

## 2020-10-12 RX ORDER — SODIUM CHLORIDE 0.9 % (FLUSH) 0.9 %
10 SYRINGE (ML) INJECTION PRN
Status: DISCONTINUED | OUTPATIENT
Start: 2020-10-12 | End: 2020-10-13 | Stop reason: HOSPADM

## 2020-10-12 RX ORDER — SODIUM CHLORIDE 9 MG/ML
20 INJECTION, SOLUTION INTRAVENOUS ONCE
Status: DISCONTINUED | OUTPATIENT
Start: 2020-10-12 | End: 2020-10-13 | Stop reason: HOSPADM

## 2020-10-12 RX ORDER — HEPARIN SODIUM (PORCINE) LOCK FLUSH IV SOLN 100 UNIT/ML 100 UNIT/ML
500 SOLUTION INTRAVENOUS PRN
Status: DISCONTINUED | OUTPATIENT
Start: 2020-10-12 | End: 2020-10-13 | Stop reason: HOSPADM

## 2020-10-12 RX ADMIN — SODIUM CHLORIDE, PRESERVATIVE FREE 10 ML: 5 INJECTION INTRAVENOUS at 13:42

## 2020-10-12 RX ADMIN — FAMOTIDINE 20 MG: 10 INJECTION, SOLUTION INTRAVENOUS at 12:06

## 2020-10-12 RX ADMIN — SODIUM CHLORIDE 20 ML/HR: 9 INJECTION, SOLUTION INTRAVENOUS at 12:02

## 2020-10-12 RX ADMIN — Medication 500 UNITS: at 13:42

## 2020-10-12 RX ADMIN — DEXAMETHASONE SODIUM PHOSPHATE: 10 INJECTION INTRAMUSCULAR; INTRAVENOUS at 12:09

## 2020-10-12 RX ADMIN — DIPHENHYDRAMINE HYDROCHLORIDE 25 MG: 50 INJECTION INTRAMUSCULAR; INTRAVENOUS at 12:02

## 2020-10-12 RX ADMIN — PACLITAXEL 130 MG: 6 INJECTION, SOLUTION, CONCENTRATE INTRAVENOUS at 12:31

## 2020-10-12 NOTE — PROGRESS NOTES
Patient here for chemo. Gait steady. Continues to have neuropathy in her feet. States \"I can't feel anything in the bottoms of my feet. \" Encouraged to always wear shoes. States she had numbness up to bilateral mid-forearms. States \"It is just in my fingertips today. \" Denies pain. Appetite okay. CBCD done. Results reviewed with Dr. Pipo Hankins along with c/o neuropathy. Order received to decrease dose of Taxol for today and next week. Chemo given as ordered. RTC 1 week.

## 2020-10-19 ENCOUNTER — HOSPITAL ENCOUNTER (OUTPATIENT)
Dept: INFUSION THERAPY | Age: 70
Discharge: HOME | End: 2020-10-19
Payer: MEDICARE

## 2020-10-19 VITALS
HEART RATE: 68 BPM | SYSTOLIC BLOOD PRESSURE: 144 MMHG | BODY MASS INDEX: 31.21 KG/M2 | TEMPERATURE: 97.1 F | DIASTOLIC BLOOD PRESSURE: 66 MMHG | OXYGEN SATURATION: 95 % | WEIGHT: 181.8 LBS

## 2020-10-19 DIAGNOSIS — C50.012 BILATERAL MALIGNANT NEOPLASM INVOLVING BOTH NIPPLE AND AREOLA IN FEMALE, UNSPECIFIED ESTROGEN RECEPTOR STATUS (HCC): Primary | ICD-10-CM

## 2020-10-19 DIAGNOSIS — C50.011 BILATERAL MALIGNANT NEOPLASM INVOLVING BOTH NIPPLE AND AREOLA IN FEMALE, UNSPECIFIED ESTROGEN RECEPTOR STATUS (HCC): Primary | ICD-10-CM

## 2020-10-19 DIAGNOSIS — Z17.1 MALIGNANT NEOPLASM OF UPPER-OUTER QUADRANT OF LEFT BREAST IN FEMALE, ESTROGEN RECEPTOR NEGATIVE (HCC): ICD-10-CM

## 2020-10-19 DIAGNOSIS — C50.412 MALIGNANT NEOPLASM OF UPPER-OUTER QUADRANT OF LEFT BREAST IN FEMALE, ESTROGEN RECEPTOR NEGATIVE (HCC): ICD-10-CM

## 2020-10-19 LAB
ALBUMIN SERPL-MCNC: 4.1 GM/DL (ref 3.4–5)
ALP BLD-CCNC: 52 IU/L (ref 40–128)
ALT SERPL-CCNC: 68 U/L (ref 10–40)
ANION GAP SERPL CALCULATED.3IONS-SCNC: 12 MMOL/L (ref 4–16)
AST SERPL-CCNC: 34 IU/L (ref 15–37)
BASOPHILS ABSOLUTE: 0.1 K/CU MM
BASOPHILS RELATIVE PERCENT: 3.7 % (ref 0–1)
BILIRUB SERPL-MCNC: 0.8 MG/DL (ref 0–1)
BUN BLDV-MCNC: 17 MG/DL (ref 6–23)
CALCIUM SERPL-MCNC: 9.9 MG/DL (ref 8.3–10.6)
CHLORIDE BLD-SCNC: 107 MMOL/L (ref 99–110)
CO2: 23 MMOL/L (ref 21–32)
CREAT SERPL-MCNC: 0.8 MG/DL (ref 0.6–1.1)
DIFFERENTIAL TYPE: ABNORMAL
EOSINOPHILS ABSOLUTE: 0.1 K/CU MM
EOSINOPHILS RELATIVE PERCENT: 4 % (ref 0–3)
GFR AFRICAN AMERICAN: >60 ML/MIN/1.73M2
GFR NON-AFRICAN AMERICAN: >60 ML/MIN/1.73M2
GLUCOSE BLD-MCNC: 90 MG/DL (ref 70–99)
HCT VFR BLD CALC: 32.2 % (ref 37–47)
HEMOGLOBIN: 10.6 GM/DL (ref 12.5–16)
LYMPHOCYTES ABSOLUTE: 1.2 K/CU MM
LYMPHOCYTES RELATIVE PERCENT: 33.3 % (ref 24–44)
MCH RBC QN AUTO: 32.9 PG (ref 27–31)
MCHC RBC AUTO-ENTMCNC: 32.9 % (ref 32–36)
MCV RBC AUTO: 100 FL (ref 78–100)
MONOCYTES ABSOLUTE: 0.5 K/CU MM
MONOCYTES RELATIVE PERCENT: 15.2 % (ref 0–4)
PDW BLD-RTO: 16.1 % (ref 11.7–14.9)
PLATELET # BLD: 340 K/CU MM (ref 140–440)
PMV BLD AUTO: 9.7 FL (ref 7.5–11.1)
POTASSIUM SERPL-SCNC: 4.5 MMOL/L (ref 3.5–5.1)
RBC # BLD: 3.22 M/CU MM (ref 4.2–5.4)
SEGMENTED NEUTROPHILS ABSOLUTE COUNT: 1.5 K/CU MM
SEGMENTED NEUTROPHILS RELATIVE PERCENT: 43.8 % (ref 36–66)
SODIUM BLD-SCNC: 142 MMOL/L (ref 135–145)
TOTAL PROTEIN: 6 GM/DL (ref 6.4–8.2)
WBC # BLD: 3.5 K/CU MM (ref 4–10.5)

## 2020-10-19 PROCEDURE — 96374 THER/PROPH/DIAG INJ IV PUSH: CPT

## 2020-10-19 PROCEDURE — 2500000003 HC RX 250 WO HCPCS: Performed by: INTERNAL MEDICINE

## 2020-10-19 PROCEDURE — 80053 COMPREHEN METABOLIC PANEL: CPT

## 2020-10-19 PROCEDURE — 2580000003 HC RX 258: Performed by: INTERNAL MEDICINE

## 2020-10-19 PROCEDURE — 36591 DRAW BLOOD OFF VENOUS DEVICE: CPT

## 2020-10-19 PROCEDURE — 85025 COMPLETE CBC W/AUTO DIFF WBC: CPT

## 2020-10-19 PROCEDURE — 6360000002 HC RX W HCPCS: Performed by: INTERNAL MEDICINE

## 2020-10-19 PROCEDURE — 96413 CHEMO IV INFUSION 1 HR: CPT

## 2020-10-19 PROCEDURE — 96375 TX/PRO/DX INJ NEW DRUG ADDON: CPT

## 2020-10-19 RX ORDER — SODIUM CHLORIDE 9 MG/ML
20 INJECTION, SOLUTION INTRAVENOUS ONCE
Status: DISCONTINUED | OUTPATIENT
Start: 2020-10-19 | End: 2020-10-20 | Stop reason: HOSPADM

## 2020-10-19 RX ORDER — HEPARIN SODIUM (PORCINE) LOCK FLUSH IV SOLN 100 UNIT/ML 100 UNIT/ML
500 SOLUTION INTRAVENOUS PRN
Status: DISCONTINUED | OUTPATIENT
Start: 2020-10-19 | End: 2020-10-20 | Stop reason: HOSPADM

## 2020-10-19 RX ORDER — SODIUM CHLORIDE 0.9 % (FLUSH) 0.9 %
10 SYRINGE (ML) INJECTION PRN
Status: DISCONTINUED | OUTPATIENT
Start: 2020-10-19 | End: 2020-10-20 | Stop reason: HOSPADM

## 2020-10-19 RX ORDER — DIPHENHYDRAMINE HYDROCHLORIDE 50 MG/ML
25 INJECTION INTRAMUSCULAR; INTRAVENOUS ONCE
Status: COMPLETED | OUTPATIENT
Start: 2020-10-19 | End: 2020-10-19

## 2020-10-19 RX ADMIN — Medication 500 UNITS: at 12:21

## 2020-10-19 RX ADMIN — DIPHENHYDRAMINE HYDROCHLORIDE 25 MG: 50 INJECTION INTRAMUSCULAR; INTRAVENOUS at 10:53

## 2020-10-19 RX ADMIN — PACLITAXEL 130 MG: 6 INJECTION, SOLUTION INTRAVENOUS at 11:19

## 2020-10-19 RX ADMIN — SODIUM CHLORIDE 20 ML/HR: 9 INJECTION, SOLUTION INTRAVENOUS at 10:53

## 2020-10-19 RX ADMIN — SODIUM CHLORIDE, PRESERVATIVE FREE 10 ML: 5 INJECTION INTRAVENOUS at 12:20

## 2020-10-19 RX ADMIN — FAMOTIDINE 20 MG: 10 INJECTION, SOLUTION INTRAVENOUS at 10:53

## 2020-10-19 RX ADMIN — DEXAMETHASONE SODIUM PHOSPHATE: 10 INJECTION INTRAMUSCULAR; INTRAVENOUS at 10:53

## 2020-10-19 ASSESSMENT — PAIN DESCRIPTION - PAIN TYPE: TYPE: NEUROPATHIC PAIN

## 2020-10-19 ASSESSMENT — PAIN SCALES - GENERAL: PAINLEVEL_OUTOF10: 7

## 2020-10-19 NOTE — PROGRESS NOTES
1015: Pt here for chemo, A&OX3. No new issues to report. Pt reports baseline neuropathy in her feet, she reports old rash with marks on her skin still present. She reports baseline neuropathy in her feet. She reports this is her last chemo treatment and then she will need a colonoscopy and then will f/u to get radiation treatments started in the coming weeks. Access: Right chest wall port, accessed with a 20 gauge 1inch Dumas needle, + blood return, dressing to site. Treatment:      4279: Dexamethasone 10mg bag hung to infuse over 15 minutes. 1053: Benadryl 25mg given IVP without issues    1053: Pepcid 20mg given IVP without issues    Pt in no distress, reading a book. ANC 1.5- tx started. 11:19: Taxol hung, to infuse over 1hour. + blood return pre treatment. Pt in no distress, still reading her book     1220: Treatment completed, Port flushed with NS and heparin and deaccessed, bandaid to site. Pt given return tx appt and AVS report and labs from today.

## 2020-10-20 NOTE — PROGRESS NOTES
Patient Name: Chris Sherman  Patient : 1950  Patient MRN: [id number]     Primary Oncologist: Carlos Jernigan MD  Referring Provider: Junella Bosworth, MD     Date of Service: 10/21/2020      Chief Complaint:   Chief Complaint   Patient presents with    Follow-up    Other     Flu shot? Patient Active Problem List:     Malignant neoplasm of female breast      Postoperative nausea     Neutropenic fever      Alopecia (capitis) totalis    HPI:   Ms. Elizabeth Loyd is a 78year old very pleasant female with medical history significant for hypertension, hyperlipidemia, coronary artery disease, referred to me on 2020 for evaluation of his clinical stage IIA left breast, high grade, triple negative, invasive ductal carcinoma. She stated that she has echocardiogram and stress test on 2020. It showed normal EF (EF 51%). Concentric left ventricular hypertrophy and she has mild to moderate mitral regurfitation. She was incidentally noted to have left breast mass. She was subsequently evaluated by Dr. Lynda Alfaro and she requested diagnostic mammogram. She underwent diagnostic digital bilateral mammogram on 2020 and it showed dumbbell-shaped mass at 2-3 o'clock at middle to posterior depth with associated pleomorphic calcifications, in her left breast. Targeted ultrasound at 2 o'clock at a distance of 7 cm from the nipple demonstrates a dumbbell-shaped hypoechoic mass with angular margins that measures 3.3 x 2.5 x 2.1 cm. Internal vascularity noted along with some posterior acoustic shadowing. No suspicious axillary lymph nodes. She underwent ultrasound guided core biopsy of left breast and clip marker placement on 2020 and final pathology showed invasive ductal carcinoma. Tumor size is at least 12 mm and it is a high grade tumor. ER by IHC is negative (0%), PgR by IHC is negative (0%), Her2 by IHC is equivocal (score 2+) and Her2 by FISH is negative.      Since she is found to have treatment. Past Medical History  Significant for  1. Hypertension  2. Hyperlipidemia  3. Obesity  4. Coronary artery disease    Surgical History  Significant for  1. Shoulder repair in 2016  2.  section in     Allergies  Tylenol-Codeine    Social History  She denies smoking, alcohol drinking and illicit drug abuse. She is currently living in [facility]. Family History  Father: Hypertension  Mother: Hypertension    Review of Systems: \"Per interval history; otherwise 10 point ROS is negative. \"     Her energy level is good, appetite, and sleep are okay. She doesn't have fever, chills, night sweats, cough, shortness of breath, chest pain, hemoptysis or palpitations. Her bowel and bladder functions are normal. She denies nausea, vomiting, abdominal pain, diarrhea, constipation, dysuria, loss of appetite or weight loss. She has BLE neuropathy. She is still able to completed fine motor tasks,  she does not have bleeding or clotting issues. Denies any pain in her body. No anxiety or depression. The rest of the systems are unremarkable.     Vital Signs:  BP (!) 160/73 (Site: Right Upper Arm, Position: Sitting, Cuff Size: Medium Adult)   Pulse 67   Temp 96.7 °F (35.9 °C) (Tympanic)   Resp 16   Ht 5' 4\" (1.626 m)   Wt 183 lb (83 kg)   LMP  (LMP Unknown)   SpO2 98%   BMI 31.41 kg/m²     Physical Exam:  CONSTITUTIONAL: awake, alert, cooperative, no apparent distress   EYES:  sclera clear and conjunctiva normal  ENT: Normocephalic, without obvious abnormality, atraumatic  NECK: supple, symmetrical  HEMATOLOGIC/LYMPHATIC: no cervical, supraclavicular lymphadenopathy   LUNGS: no increased work of breathing and clear to auscultation   CARDIOVASCULAR: regular rate and rhythm, normal S1 and S2,  ABDOMEN: normal bowel sounds x 4, soft, non-distended, non-tender, no masses palpated, no hepatosplenomegaly   MUSCULOSKELETAL: full range of motion noted, tone is normal  NEUROLOGIC: awake, alert, oriented to name, place and time. Motor skills grossly intact. SKIN: Normal skin color, texture, turgor and no jaundice.  appears intact   EXTREMITIES: no LE edema     Labs:  Hematology:  Lab Results   Component Value Date    WBC 3.5 (L) 10/19/2020    RBC 3.22 (L) 10/19/2020    HGB 10.6 (L) 10/19/2020    HCT 32.2 (L) 10/19/2020    .0 10/19/2020    MCH 32.9 (H) 10/19/2020    MCHC 32.9 10/19/2020    RDW 16.1 (H) 10/19/2020     10/19/2020    MPV 9.7 10/19/2020    BANDSPCT 31 (H) 06/13/2020    SEGSPCT 43.8 10/19/2020    EOSRELPCT 4.0 (H) 10/19/2020    BASOPCT 3.7 (H) 10/19/2020    LYMPHOPCT 33.3 10/19/2020    MONOPCT 15.2 (H) 10/19/2020    BANDABS 10.20 06/13/2020    SEGSABS 1.5 10/19/2020    EOSABS 0.1 10/19/2020    BASOSABS 0.1 10/19/2020    LYMPHSABS 1.2 10/19/2020    MONOSABS 0.5 10/19/2020    DIFFTYPE AUTOMATED DIFFERENTIAL 10/19/2020    ANISOCYTOSIS 1+ 06/13/2020    POLYCHROM 1+ 06/13/2020    WBCMORP OCCASIONAL 06/10/2020    PLTM FEW 06/13/2020     No results found for: ESR  Chemistry:  Lab Results   Component Value Date     10/19/2020    K 4.5 10/19/2020     10/19/2020    CO2 23 10/19/2020    BUN 17 10/19/2020    CREATININE 0.8 10/19/2020    GLUCOSE 90 10/19/2020    CALCIUM 9.9 10/19/2020    PROT 6.0 (L) 10/19/2020    LABALBU 4.1 10/19/2020    BILITOT 0.8 10/19/2020    ALKPHOS 52 10/19/2020    AST 34 10/19/2020    ALT 68 (H) 10/19/2020    LABGLOM >60 10/19/2020    GFRAA >60 10/19/2020    AGRATIO 1.9 01/29/2020    GLOB 2.3 01/29/2020    MG 1.6 (L) 06/12/2020     No results found for: MMA, LDH, HOMOCYSTEINE  No components found for: LD  Lab Results   Component Value Date    T4FREE 1.0 02/07/2019     Immunology:  Lab Results   Component Value Date    PROT 6.0 (L) 10/19/2020     No results found for: [place], KLFLCR  No results found for: B2M  Coagulation Panel:  No results found for: PROTIME, INR, APTT, DDIMER  Anemia Panel:  No results found for: [place], FOLATE  Tumor Markers:  Lab Results   Component Value Date    LABCA2 28.6 04/30/2020     Observations:  No data recorded        Assessment & Plan:   Clinical stage IIA left breast, high grade, triple negative, invasive ductal carcinoma    PLAN  Ms. Jordana Florian is a 78year old very pleasant female who was incidentally found to have left breast mass on Echocardiogram done on February 11, 2020. Further work ups with diagnostic digital mammogram and targeted sonogram of left breast revealed that she has about 3.3 cm mass in her left breast upper outer quadrant. She subsequently had ultrasound guided biopsy of the left breast mass on February 25, 2020 and final pathology revealed clinical stage IIA high grade, triple negative, left breast invasive ductal carcinoma. She was seen by Dr. Guero Oleary and she underwent left breast lumpectomy and sentinel lymph node biopsy on March 20, 2020. Final pathology showed stage IIA INVASIVE DUCTAL CARCINOMA WITH EXTENSIVE NECROSIS, GRADE 3, 2.8 CM. IN GREATEST DIMENSION. SURGICAL MARGINS ARE POSITIVE FOR MALIGNANCY. Ductal Carcinoma In Situ and lobular Carcinoma In Situ are not present. One sentinel lymph node examined was negative for metastasis. No lymphovascular or dermal lymphovascular invasion. Pathologic Stage Classification: pT2, N0, Mx. Repeat surgery for positive margins was done on March 30, 2020 and there was no residual cancer seen. Since she is found to have stage IIA left breast triple negative invasive ductal carcinoma, I recommend for adjuvant chemotherapy and radiation therapy. She had Echocardiogram on 2/2020 and her EF was 51%. Dose dense chemotherapy with doxorubicin/cyclophosphamide, followed by Paclitaxel was started on May 4, 2020. On October 21, 2020, she presented to me for follow-up. I have been following her for stage IIA left breast triple negative invasive ductal carcinoma and she is status post left breast lumpectomy and sentinel lymph node biopsy.   She is currently on adjuvant chemotherapy with dose dense doxorubicin and cyclophosphamide, followed by weekly paclitaxel since May 4, 2020. She completed 12 cycles of weekly taxol on October 19.2020. She has ongoing issues with neuropathy. She does not want to begin any medications to alleviate symptoms. She has chemo induced nail changes and she most likely loose some of her nail without signs of infection. She will see radiation oncology for CT simulation 11/10/2020. She has follow up with Dr. Devonte Loyd that same day. She would like to pursue colonscopy and flu shot. She is advised to wait two to three weeks after last treatment. I answered all her questions and concerns for today. I recommend her to follow-up with primary care physician, Dr. John evans on regular basis and I will continue to keep you updated on her progress. Thank you for allowing me to participate in the care of this very pleasant patient. Recent imaging and labs were reviewed and discussed with the patient.

## 2020-10-21 ENCOUNTER — HOSPITAL ENCOUNTER (OUTPATIENT)
Dept: INFUSION THERAPY | Age: 70
Discharge: HOME | End: 2020-10-21
Payer: MEDICARE

## 2020-10-21 ENCOUNTER — OFFICE VISIT (OUTPATIENT)
Dept: ONCOLOGY | Age: 70
End: 2020-10-21
Payer: MEDICARE

## 2020-10-21 VITALS
DIASTOLIC BLOOD PRESSURE: 73 MMHG | BODY MASS INDEX: 31.24 KG/M2 | WEIGHT: 183 LBS | HEIGHT: 64 IN | SYSTOLIC BLOOD PRESSURE: 160 MMHG | HEART RATE: 67 BPM | TEMPERATURE: 96.7 F | OXYGEN SATURATION: 98 % | RESPIRATION RATE: 16 BRPM

## 2020-10-21 PROCEDURE — 99213 OFFICE O/P EST LOW 20 MIN: CPT | Performed by: NURSE PRACTITIONER

## 2020-10-21 PROCEDURE — 99211 OFF/OP EST MAY X REQ PHY/QHP: CPT

## 2020-10-21 NOTE — PROGRESS NOTES
MA Rooming Questions  Patient: Jakub Mccarthy  MRN: [id number]    Date: 10/21/2020        1. Do you have any new issues? yes - can she get a flu shot? 2. Do you need any refills on medications?    no    3. Have you had any imaging done since your last visit?   no    4. Have you been hospitalized or seen in the emergency room since your last visit here?   no    5. Did the patient have a depression screening completed today?  No    No data recorded     PHQ-9 Given to (if applicable):               PHQ-9 Score (if applicable):                     [] Positive     []  Negative              Does question #9 need addressed (if applicable)                     [] Yes    []  No               Apolonia Castellano MA

## 2020-10-28 ENCOUNTER — TELEPHONE (OUTPATIENT)
Dept: FAMILY MEDICINE CLINIC | Age: 70
End: 2020-10-28

## 2020-10-28 NOTE — TELEPHONE ENCOUNTER
She got it from [address] [place] [address] told her to switch it to [facility].  [facility] told patient that when it is time for her to get her refills, that the pharmacy( [facility]) should have the atenolol in but if not they will send the script to [facility] again

## 2020-10-28 NOTE — TELEPHONE ENCOUNTER
Please check with the patient. Let her know that her atenolol 100 mg is not available at [facility] right now. Would she be okay with us switching the prescription to another non-[facility] pharmacy?   If so which one?

## 2020-11-09 NOTE — PROGRESS NOTES
Patient Name: Romeo Patino  Patient : 1950  Patient MRN: [id number]     Primary Oncologist: Taco Garcia MD  Referring Provider: Sulaiman Mathis MD     Date of Service: 11/10/2020      Chief Complaint:   Chief Complaint   Patient presents with    Follow-up     Patient Active Problem List:     Malignant neoplasm of female breast      Postoperative nausea     Neutropenic fever      Alopecia (capitis) totalis    HPI:   Ms. Timmy Hernandez is a 74-year-old very pleasant female with medical history significant for hypertension, hyperlipidemia, coronary artery disease, referred to me on 2020 for evaluation of his clinical stage IIA left breast, high grade, triple negative, invasive ductal carcinoma. She stated that she has echocardiogram and stress test on 2020. It showed normal EF (EF 51%). Concentric left ventricular hypertrophy and she has mild to moderate mitral regurfitation. She was incidentally noted to have left breast mass. She was subsequently evaluated by Dr. Jani Vang and she requested diagnostic mammogram. She underwent diagnostic digital bilateral mammogram on 2020 and it showed dumbbell-shaped mass at 2-3 o'clock at middle to posterior depth with associated pleomorphic calcifications, in her left breast. Targeted ultrasound at 2 o'clock at a distance of 7 cm from the nipple demonstrates a dumbbell-shaped hypoechoic mass with angular margins that measures 3.3 x 2.5 x 2.1 cm. Internal vascularity noted along with some posterior acoustic shadowing. No suspicious axillary lymph nodes. She underwent ultrasound guided core biopsy of left breast and clip marker placement on 2020 and final pathology showed invasive ductal carcinoma. Tumor size is at least 12 mm and it is a high grade tumor. ER by IHC is negative (0%), PgR by IHC is negative (0%), Her2 by IHC is equivocal (score 2+) and Her2 by FISH is negative.      Since she is found to have clinical stage IIA left breast invasive ductal carcinoma, she was subsequently referred to me for further evaluation. She was seen by Dr. Sabiha Albright and she underwent left breast lumpectomy and sentinel lymph node biopsy on March 20, 2020. Final pathology showed stage IIA INVASIVE DUCTAL CARCINOMA WITH EXTENSIVE NECROSIS, GRADE 3, 2.8 CM. IN GREATEST DIMENSION. SURGICAL MARGINS ARE POSITIVE FOR MALIGNANCY. Ductal Carcinoma In Situ and lobular Carcinoma In Situ are not present. One sentinel lymph node examined was negative for metastasis. No lymphovascular or dermal lymphovascular invasion. Pathologic Stage Classification: pT2, N0, Mx. Repeat surgery for positive margins was done on March 30, 2020 and there was no residual cancer seen. Since she is found to have stage IIA left breast triple negative invasive ductal carcinoma, I recommend for adjuvant chemotherapy and radiation therapy. She had Echocardiogram on 2/2020 and her EF was 51%. Dose dense chemotherapy with doxorubicin/cyclophosphamide, followed by Paclitaxel was started on May 4, 2020 and she completed it on 10/19/2020. On November 10, 2020, she presented to me for follow-up. I have been following her for stage IIA left breast triple negative invasive ductal carcinoma and she is status post left breast lumpectomy and sentinel lymph node biopsy. She is also status post adjuvant chemotherapy with dose dense doxorubicin and cyclophosphamide, followed by weekly paclitaxel. She was seen by Dr. Shaila Rush and she is in the process of starting adjuvant radiation therapy. She has significant neuropathy and she stated that she couldn't even sleep sometimes because of that. I will start cymbalta 60 mg daily today. I will re evaluate her symptoms in three months. She does not have any signs or symptoms suggestive of recurrence of metastatic breast cancer at today visit. I recommend her to start radiation therapy as planned.  She doesn't have any other significant PROT 6.0 (L) 10/19/2020     No results found for: Pelham Karissa, KLFLCR  No results found for: B2M  Coagulation Panel:  No results found for: PROTIME, INR, APTT, DDIMER  Anemia Panel:  No results found for: [id number], FOLATE  Tumor Markers:  Lab Results   Component Value Date    LABCA2 28.6 04/30/2020     Observations:  No data recorded       Assessment & Plan:   Clinical stage IIA left breast, high grade, triple negative, invasive ductal carcinoma    PLAN  Ms. Alek Irizarry is a 70-year-old very pleasant female who was incidentally found to have left breast mass on Echocardiogram done on February 11, 2020. Further work ups with diagnostic digital mammogram and targeted sonogram of left breast revealed that she has about 3.3 cm mass in her left breast upper outer quadrant. She subsequently had ultrasound guided biopsy of the left breast mass on February 25, 2020 and final pathology revealed clinical stage IIA high grade, triple negative, left breast invasive ductal carcinoma. She was seen by Dr. Rasheed Monsalve and she underwent left breast lumpectomy and sentinel lymph node biopsy on March 20, 2020. Final pathology showed stage IIA INVASIVE DUCTAL CARCINOMA WITH EXTENSIVE NECROSIS, GRADE 3, 2.8 CM. IN GREATEST DIMENSION. SURGICAL MARGINS ARE POSITIVE FOR MALIGNANCY. Ductal Carcinoma In Situ and lobular Carcinoma In Situ are not present. One sentinel lymph node examined was negative for metastasis. No lymphovascular or dermal lymphovascular invasion. Pathologic Stage Classification: pT2, N0, Mx. Repeat surgery for positive margins was done on March 30, 2020 and there was no residual cancer seen. Since she is found to have stage IIA left breast triple negative invasive ductal carcinoma, I recommend for adjuvant chemotherapy and radiation therapy. She had Echocardiogram on 2/2020 and her EF was 51%.       Dose dense chemotherapy with doxorubicin/cyclophosphamide, followed by Paclitaxel was started on May 4, 2020 and she completed it on 10/19/2020. On November 10, 2020, she presented to me for follow-up. I have been following her for stage IIA left breast triple negative invasive ductal carcinoma and she is status post left breast lumpectomy and sentinel lymph node biopsy. She is also status post adjuvant chemotherapy with dose dense doxorubicin and cyclophosphamide, followed by weekly paclitaxel. She was seen by Dr. Angela Chung and she is in the process of starting adjuvant radiation therapy. She has significant neuropathy and she stated that she couldn't even sleep sometimes because of that. I will start cymbalta 60 mg daily today. I will re evaluate her symptoms in three months. She does not have any signs or symptoms suggestive of recurrence of metastatic breast cancer at today visit. I answered all her questions and concerns for today. I recommend her to follow-up with primary care physician, Dr. Edith Mike on regular basis. Recent imaging and labs were reviewed and discussed with the patient.

## 2020-11-10 ENCOUNTER — APPOINTMENT (OUTPATIENT)
Dept: RADIATION ONCOLOGY | Age: 70
End: 2020-11-10
Attending: RADIOLOGY
Payer: MEDICARE

## 2020-11-10 ENCOUNTER — OFFICE VISIT (OUTPATIENT)
Dept: ONCOLOGY | Age: 70
End: 2020-11-10
Payer: MEDICARE

## 2020-11-10 ENCOUNTER — HOSPITAL ENCOUNTER (OUTPATIENT)
Dept: INFUSION THERAPY | Age: 70
Discharge: HOME | End: 2020-11-10
Payer: MEDICARE

## 2020-11-10 VITALS
HEART RATE: 65 BPM | TEMPERATURE: 97 F | WEIGHT: 184.8 LBS | RESPIRATION RATE: 18 BRPM | BODY MASS INDEX: 31.55 KG/M2 | SYSTOLIC BLOOD PRESSURE: 160 MMHG | OXYGEN SATURATION: 98 % | DIASTOLIC BLOOD PRESSURE: 70 MMHG | HEIGHT: 64 IN

## 2020-11-10 PROBLEM — T45.1X5A CHEMOTHERAPY-INDUCED NEUROPATHY (HCC): Status: ACTIVE | Noted: 2020-11-10

## 2020-11-10 PROBLEM — G62.0 CHEMOTHERAPY-INDUCED NEUROPATHY (HCC): Status: ACTIVE | Noted: 2020-11-10

## 2020-11-10 PROCEDURE — 99211 OFF/OP EST MAY X REQ PHY/QHP: CPT

## 2020-11-10 PROCEDURE — 99214 OFFICE O/P EST MOD 30 MIN: CPT | Performed by: INTERNAL MEDICINE

## 2020-11-10 RX ORDER — DULOXETIN HYDROCHLORIDE 60 MG/1
60 CAPSULE, DELAYED RELEASE ORAL DAILY
Qty: 30 CAPSULE | Refills: 3 | Status: SHIPPED | OUTPATIENT
Start: 2020-11-10 | End: 2021-02-10 | Stop reason: SDUPTHER

## 2020-11-10 NOTE — PROGRESS NOTES
MA Rooming Questions  Patient: Rogers Guo  MRN: [id number]    Date: 11/10/2020        1. Do you have any new issues? yes - swelling in legs and feet, neuropathy in hands and feet, headaches on and off.         2. Do you need any refills on medications?    no    3. Have you had any imaging done since your last visit?   no    4. Have you been hospitalized or seen in the emergency room since your last visit here?   no    5. Did the patient have a depression screening completed today?  No    No data recorded     PHQ-9 Given to (if applicable):               PHQ-9 Score (if applicable):                     [] Positive     []  Negative              Does question #9 need addressed (if applicable)                     [] Yes    []  No               Ramesh Lainez MA

## 2020-11-17 ENCOUNTER — APPOINTMENT (OUTPATIENT)
Dept: RADIATION ONCOLOGY | Age: 70
End: 2020-11-17
Attending: RADIOLOGY
Payer: MEDICARE

## 2020-11-17 ENCOUNTER — HOSPITAL ENCOUNTER (OUTPATIENT)
Dept: RADIATION ONCOLOGY | Age: 70
Discharge: HOME | End: 2020-11-17
Attending: RADIOLOGY
Payer: MEDICARE

## 2020-11-17 PROCEDURE — 77263 THER RADIOLOGY TX PLNG CPLX: CPT | Performed by: RADIOLOGY

## 2020-11-17 PROCEDURE — 77332 RADIATION TREATMENT AID(S): CPT | Performed by: RADIOLOGY

## 2020-11-17 PROCEDURE — 77290 THER RAD SIMULAJ FIELD CPLX: CPT | Performed by: RADIOLOGY

## 2020-11-18 ENCOUNTER — APPOINTMENT (OUTPATIENT)
Dept: RADIATION ONCOLOGY | Age: 70
End: 2020-11-18
Attending: RADIOLOGY
Payer: MEDICARE

## 2020-11-19 ENCOUNTER — APPOINTMENT (OUTPATIENT)
Dept: RADIATION ONCOLOGY | Age: 70
End: 2020-11-19
Attending: RADIOLOGY
Payer: MEDICARE

## 2020-11-19 PROCEDURE — 77334 RADIATION TREATMENT AID(S): CPT | Performed by: RADIOLOGY

## 2020-11-19 PROCEDURE — 77295 3-D RADIOTHERAPY PLAN: CPT | Performed by: RADIOLOGY

## 2020-11-20 ENCOUNTER — APPOINTMENT (OUTPATIENT)
Dept: RADIATION ONCOLOGY | Age: 70
End: 2020-11-20
Attending: RADIOLOGY
Payer: MEDICARE

## 2020-11-23 ENCOUNTER — HOSPITAL ENCOUNTER (OUTPATIENT)
Dept: RADIATION ONCOLOGY | Age: 70
Discharge: HOME | End: 2020-11-23
Attending: RADIOLOGY
Payer: MEDICARE

## 2020-11-23 ENCOUNTER — APPOINTMENT (OUTPATIENT)
Dept: RADIATION ONCOLOGY | Age: 70
End: 2020-11-23
Attending: RADIOLOGY
Payer: MEDICARE

## 2020-11-23 PROCEDURE — 77280 THER RAD SIMULAJ FIELD SMPL: CPT | Performed by: RADIOLOGY

## 2020-11-24 ENCOUNTER — APPOINTMENT (OUTPATIENT)
Dept: RADIATION ONCOLOGY | Age: 70
End: 2020-11-24
Attending: RADIOLOGY
Payer: MEDICARE

## 2020-11-25 ENCOUNTER — APPOINTMENT (OUTPATIENT)
Dept: RADIATION ONCOLOGY | Age: 70
End: 2020-11-25
Attending: RADIOLOGY
Payer: MEDICARE

## 2020-11-30 ENCOUNTER — HOSPITAL ENCOUNTER (OUTPATIENT)
Dept: RADIATION ONCOLOGY | Age: 70
Discharge: HOME | End: 2020-11-30
Attending: RADIOLOGY
Payer: MEDICARE

## 2020-11-30 ENCOUNTER — APPOINTMENT (OUTPATIENT)
Dept: RADIATION ONCOLOGY | Age: 70
End: 2020-11-30
Attending: RADIOLOGY
Payer: MEDICARE

## 2020-11-30 VITALS — BODY MASS INDEX: 31.72 KG/M2 | WEIGHT: 184.8 LBS

## 2020-11-30 PROCEDURE — 77412 RADIATION TX DELIVERY LVL 3: CPT | Performed by: RADIOLOGY

## 2020-11-30 PROCEDURE — 77300 RADIATION THERAPY DOSE PLAN: CPT | Performed by: RADIOLOGY

## 2020-11-30 ASSESSMENT — PAIN SCALES - GENERAL: PAINLEVEL_OUTOF10: 0

## 2020-11-30 NOTE — PLAN OF CARE
Care plan reviewed, no chnages noted. _Pt reports irritation/itching under bilat breast and axilla. States uses cream during flare ups, pt reports hx psoriasis and has had this for months. Denies any recent changes.

## 2020-12-01 ENCOUNTER — APPOINTMENT (OUTPATIENT)
Dept: RADIATION ONCOLOGY | Age: 70
End: 2020-12-01
Attending: RADIOLOGY
Payer: MEDICARE

## 2020-12-01 ENCOUNTER — HOSPITAL ENCOUNTER (OUTPATIENT)
Dept: RADIATION ONCOLOGY | Age: 70
Discharge: HOME | End: 2020-12-01
Attending: RADIOLOGY
Payer: MEDICARE

## 2020-12-01 PROCEDURE — 77412 RADIATION TX DELIVERY LVL 3: CPT | Performed by: RADIOLOGY

## 2020-12-02 ENCOUNTER — APPOINTMENT (OUTPATIENT)
Dept: RADIATION ONCOLOGY | Age: 70
End: 2020-12-02
Attending: RADIOLOGY
Payer: MEDICARE

## 2020-12-02 ENCOUNTER — HOSPITAL ENCOUNTER (OUTPATIENT)
Dept: RADIATION ONCOLOGY | Age: 70
Discharge: HOME | End: 2020-12-02
Attending: RADIOLOGY
Payer: MEDICARE

## 2020-12-02 PROCEDURE — 77412 RADIATION TX DELIVERY LVL 3: CPT | Performed by: RADIOLOGY

## 2020-12-02 PROCEDURE — 77427 RADIATION TX MANAGEMENT X5: CPT | Performed by: RADIOLOGY

## 2020-12-03 ENCOUNTER — APPOINTMENT (OUTPATIENT)
Dept: RADIATION ONCOLOGY | Age: 70
End: 2020-12-03
Attending: RADIOLOGY
Payer: MEDICARE

## 2020-12-03 ENCOUNTER — HOSPITAL ENCOUNTER (OUTPATIENT)
Dept: RADIATION ONCOLOGY | Age: 70
Discharge: HOME | End: 2020-12-03
Attending: RADIOLOGY
Payer: MEDICARE

## 2020-12-03 PROCEDURE — 77412 RADIATION TX DELIVERY LVL 3: CPT | Performed by: RADIOLOGY

## 2020-12-04 ENCOUNTER — HOSPITAL ENCOUNTER (OUTPATIENT)
Dept: RADIATION ONCOLOGY | Age: 70
Discharge: HOME | End: 2020-12-04
Attending: RADIOLOGY
Payer: MEDICARE

## 2020-12-04 ENCOUNTER — APPOINTMENT (OUTPATIENT)
Dept: RADIATION ONCOLOGY | Age: 70
End: 2020-12-04
Attending: RADIOLOGY
Payer: MEDICARE

## 2020-12-04 PROCEDURE — 77412 RADIATION TX DELIVERY LVL 3: CPT | Performed by: RADIOLOGY

## 2020-12-04 PROCEDURE — 77417 THER RADIOLOGY PORT IMAGE(S): CPT | Performed by: RADIOLOGY

## 2020-12-04 PROCEDURE — 77336 RADIATION PHYSICS CONSULT: CPT | Performed by: RADIOLOGY

## 2020-12-07 ENCOUNTER — APPOINTMENT (OUTPATIENT)
Dept: RADIATION ONCOLOGY | Age: 70
End: 2020-12-07
Attending: RADIOLOGY
Payer: MEDICARE

## 2020-12-07 ENCOUNTER — HOSPITAL ENCOUNTER (OUTPATIENT)
Dept: RADIATION ONCOLOGY | Age: 70
Discharge: HOME | End: 2020-12-07
Attending: RADIOLOGY
Payer: MEDICARE

## 2020-12-07 VITALS — BODY MASS INDEX: 31.34 KG/M2 | WEIGHT: 182.6 LBS

## 2020-12-07 PROCEDURE — 77412 RADIATION TX DELIVERY LVL 3: CPT | Performed by: RADIOLOGY

## 2020-12-07 ASSESSMENT — PAIN SCALES - GENERAL: PAINLEVEL_OUTOF10: 0

## 2020-12-08 ENCOUNTER — HOSPITAL ENCOUNTER (OUTPATIENT)
Dept: RADIATION ONCOLOGY | Age: 70
Discharge: HOME | End: 2020-12-08
Attending: RADIOLOGY
Payer: MEDICARE

## 2020-12-08 ENCOUNTER — APPOINTMENT (OUTPATIENT)
Dept: RADIATION ONCOLOGY | Age: 70
End: 2020-12-08
Attending: RADIOLOGY
Payer: MEDICARE

## 2020-12-08 PROCEDURE — 77412 RADIATION TX DELIVERY LVL 3: CPT | Performed by: RADIOLOGY

## 2020-12-09 ENCOUNTER — HOSPITAL ENCOUNTER (OUTPATIENT)
Dept: RADIATION ONCOLOGY | Age: 70
Discharge: HOME | End: 2020-12-09
Attending: RADIOLOGY
Payer: MEDICARE

## 2020-12-09 ENCOUNTER — APPOINTMENT (OUTPATIENT)
Dept: RADIATION ONCOLOGY | Age: 70
End: 2020-12-09
Attending: RADIOLOGY
Payer: MEDICARE

## 2020-12-09 PROCEDURE — 77412 RADIATION TX DELIVERY LVL 3: CPT | Performed by: RADIOLOGY

## 2020-12-09 PROCEDURE — 77427 RADIATION TX MANAGEMENT X5: CPT | Performed by: RADIOLOGY

## 2020-12-10 ENCOUNTER — HOSPITAL ENCOUNTER (OUTPATIENT)
Dept: RADIATION ONCOLOGY | Age: 70
Discharge: HOME | End: 2020-12-10
Attending: RADIOLOGY
Payer: MEDICARE

## 2020-12-10 ENCOUNTER — APPOINTMENT (OUTPATIENT)
Dept: RADIATION ONCOLOGY | Age: 70
End: 2020-12-10
Attending: RADIOLOGY
Payer: MEDICARE

## 2020-12-10 PROCEDURE — 77307 TELETHX ISODOSE PLAN CPLX: CPT | Performed by: RADIOLOGY

## 2020-12-10 PROCEDURE — 77290 THER RAD SIMULAJ FIELD CPLX: CPT | Performed by: RADIOLOGY

## 2020-12-10 PROCEDURE — 77412 RADIATION TX DELIVERY LVL 3: CPT | Performed by: RADIOLOGY

## 2020-12-10 PROCEDURE — 77334 RADIATION TREATMENT AID(S): CPT | Performed by: RADIOLOGY

## 2020-12-11 ENCOUNTER — APPOINTMENT (OUTPATIENT)
Dept: RADIATION ONCOLOGY | Age: 70
End: 2020-12-11
Attending: RADIOLOGY
Payer: MEDICARE

## 2020-12-11 ENCOUNTER — HOSPITAL ENCOUNTER (OUTPATIENT)
Dept: RADIATION ONCOLOGY | Age: 70
Discharge: HOME | End: 2020-12-11
Attending: RADIOLOGY
Payer: MEDICARE

## 2020-12-11 PROCEDURE — 77417 THER RADIOLOGY PORT IMAGE(S): CPT | Performed by: RADIOLOGY

## 2020-12-11 PROCEDURE — 77336 RADIATION PHYSICS CONSULT: CPT | Performed by: RADIOLOGY

## 2020-12-11 PROCEDURE — 77412 RADIATION TX DELIVERY LVL 3: CPT | Performed by: RADIOLOGY

## 2020-12-14 ENCOUNTER — APPOINTMENT (OUTPATIENT)
Dept: RADIATION ONCOLOGY | Age: 70
End: 2020-12-14
Attending: RADIOLOGY
Payer: MEDICARE

## 2020-12-14 ENCOUNTER — HOSPITAL ENCOUNTER (OUTPATIENT)
Dept: RADIATION ONCOLOGY | Age: 70
Discharge: HOME | End: 2020-12-14
Attending: RADIOLOGY
Payer: MEDICARE

## 2020-12-14 VITALS — BODY MASS INDEX: 31.17 KG/M2 | WEIGHT: 181.6 LBS

## 2020-12-14 PROCEDURE — 77412 RADIATION TX DELIVERY LVL 3: CPT | Performed by: RADIOLOGY

## 2020-12-14 NOTE — PLAN OF CARE
Care plan reviewed. Pt reports irritation/itching under bilat breast and axilla remains. Left greater than right. States using Miaderm on left breast . Pt with hx of psoriasis, so  has continued irritation in these areas even prior to treatment. Denies any major changes.     _Pt reports mild fatigue, states resting/napping PRN

## 2020-12-14 NOTE — PROGRESS NOTES
Weekly Radiation Treatment Progress Note    DATE OF SERVICE: 12/14/2020     DIAGNOSIS:  Cancer Staging  Malignant neoplasm of upper-outer quadrant of left breast in female, estrogen receptor negative ([facility] [address]  Staging form: Breast, AJCC 8th Edition  - Pathologic stage from 3/9/2020: Stage IIA (pT2, pN0, cM0, G3, ER-, [place]-, HER2-) - Signed by Murali Machado MD on 7/13/2020       TREATMENT COURSE:   Oncology History   Malignant neoplasm of upper-outer quadrant of left breast in female, estrogen receptor negative ([facility] [address]    Initial Diagnosis    Malignant neoplasm of upper-outer quadrant of left breast in female, estrogen receptor negative ([facility] [address]     3/20/2020 Surgery    Left breast lumpectomy with sentinel node sampling. Subsequent reexcision with negative pathology     5/4/2020 -  Chemotherapy    Dose dense doxorubicin/cyclophosphamide followed by weekly paclitaxel           Site: Left Breast  Current Total Radiation Dose: 2915 cGy    Pt doing well. Energy down a bit. Mild skin itching/soreness along IMF- helped  With Miaderm    EXAM  Wt Readings from Last 3 Encounters:   12/14/20 181 lb 9.6 oz (82.4 kg)   12/07/20 182 lb 9.6 oz (82.8 kg)   11/30/20 184 lb 12.8 oz (83.8 kg)     NAD  Mod Skin erythema IMF.  No desquamation    Setup images, chart, plan reviewed    A/P:   Tolerating RT well  Continue RT as planned  Cont Miaderm      Electronically signed by Surjit Man MD on 12/14/2020 at 8:57 AM

## 2020-12-15 ENCOUNTER — HOSPITAL ENCOUNTER (OUTPATIENT)
Dept: RADIATION ONCOLOGY | Age: 70
Discharge: HOME | End: 2020-12-15
Attending: RADIOLOGY
Payer: MEDICARE

## 2020-12-15 ENCOUNTER — APPOINTMENT (OUTPATIENT)
Dept: RADIATION ONCOLOGY | Age: 70
End: 2020-12-15
Attending: RADIOLOGY
Payer: MEDICARE

## 2020-12-15 PROCEDURE — 77412 RADIATION TX DELIVERY LVL 3: CPT | Performed by: RADIOLOGY

## 2020-12-16 ENCOUNTER — APPOINTMENT (OUTPATIENT)
Dept: RADIATION ONCOLOGY | Age: 70
End: 2020-12-16
Attending: RADIOLOGY
Payer: MEDICARE

## 2020-12-16 ENCOUNTER — HOSPITAL ENCOUNTER (OUTPATIENT)
Dept: RADIATION ONCOLOGY | Age: 70
Discharge: HOME | End: 2020-12-16
Attending: RADIOLOGY
Payer: MEDICARE

## 2020-12-16 PROCEDURE — 77427 RADIATION TX MANAGEMENT X5: CPT | Performed by: RADIOLOGY

## 2020-12-16 PROCEDURE — 77412 RADIATION TX DELIVERY LVL 3: CPT | Performed by: RADIOLOGY

## 2020-12-17 ENCOUNTER — APPOINTMENT (OUTPATIENT)
Dept: RADIATION ONCOLOGY | Age: 70
End: 2020-12-17
Attending: RADIOLOGY
Payer: MEDICARE

## 2020-12-17 ENCOUNTER — HOSPITAL ENCOUNTER (OUTPATIENT)
Dept: RADIATION ONCOLOGY | Age: 70
Discharge: HOME | End: 2020-12-17
Attending: RADIOLOGY
Payer: MEDICARE

## 2020-12-17 PROCEDURE — 77412 RADIATION TX DELIVERY LVL 3: CPT | Performed by: RADIOLOGY

## 2020-12-18 ENCOUNTER — HOSPITAL ENCOUNTER (OUTPATIENT)
Dept: RADIATION ONCOLOGY | Age: 70
Discharge: HOME | End: 2020-12-18
Attending: RADIOLOGY
Payer: MEDICARE

## 2020-12-18 ENCOUNTER — APPOINTMENT (OUTPATIENT)
Dept: RADIATION ONCOLOGY | Age: 70
End: 2020-12-18
Attending: RADIOLOGY
Payer: MEDICARE

## 2020-12-18 PROCEDURE — 77336 RADIATION PHYSICS CONSULT: CPT | Performed by: RADIOLOGY

## 2020-12-18 PROCEDURE — 77280 THER RAD SIMULAJ FIELD SMPL: CPT | Performed by: RADIOLOGY

## 2020-12-18 PROCEDURE — 77412 RADIATION TX DELIVERY LVL 3: CPT | Performed by: RADIOLOGY

## 2020-12-21 ENCOUNTER — HOSPITAL ENCOUNTER (OUTPATIENT)
Dept: RADIATION ONCOLOGY | Age: 70
Discharge: HOME | End: 2020-12-21
Attending: RADIOLOGY
Payer: MEDICARE

## 2020-12-21 VITALS — BODY MASS INDEX: 31.41 KG/M2 | WEIGHT: 183 LBS

## 2020-12-21 PROCEDURE — 77412 RADIATION TX DELIVERY LVL 3: CPT | Performed by: RADIOLOGY

## 2020-12-21 ASSESSMENT — PAIN SCALES - GENERAL: PAINLEVEL_OUTOF10: 0

## 2020-12-21 NOTE — PLAN OF CARE
Care plan reviewed. _Pt with soreness and red rash/itching under bilat breast and axilla remains. Left greater than right.  States started Silvadene over weekend, on moist desquamation Left IMF.      _Pt reports mild fatigue, states resting/napping PRN

## 2020-12-21 NOTE — PROGRESS NOTES
Weekly Radiation Treatment Progress Note    DATE OF SERVICE: 12/21/2020     DIAGNOSIS:  Cancer Staging  Malignant neoplasm of upper-outer quadrant of left breast in female, estrogen receptor negative ([facility] [address]  Staging form: Breast, AJCC 8th Edition  - Pathologic stage from 3/9/2020: Stage IIA (pT2, pN0, cM0, G3, ER-, [place]-, HER2-) - Signed by Vicenta Cooley MD on 7/13/2020       TREATMENT COURSE:   Oncology History   Malignant neoplasm of upper-outer quadrant of left breast in female, estrogen receptor negative ([facility] [address]    Initial Diagnosis    Malignant neoplasm of upper-outer quadrant of left breast in female, estrogen receptor negative ([facility] [address]     3/20/2020 Surgery    Left breast lumpectomy with sentinel node sampling. Subsequent reexcision with negative pathology     5/4/2020 -  Chemotherapy    Dose dense doxorubicin/cyclophosphamide followed by weekly paclitaxel     11/30/2020 -  Radiation    Left Breast: 4240cGy  Lumpectomy bed boost: 10Gy; Total dose= 5240 cGy           Site: Left Breast  Current Radiation Dose: 4240 cGy    Subjective: Increased skin reaction in left breast - started using silvadene for desquamation this weekend      EXAM  There were no vitals filed for this visit.   NAD  Grade 2 moist desquamation in left inframammary fold otherwise erythema over left breast    Setup images, chart, plan reviewed      A/P:   Tolerating tx well  Continue RT as planned  Continue silvadene      Electronically signed by Tree Prado MD on 12/21/2020 at 8:33 AM

## 2020-12-22 ENCOUNTER — HOSPITAL ENCOUNTER (OUTPATIENT)
Dept: RADIATION ONCOLOGY | Age: 70
Discharge: HOME | End: 2020-12-22
Attending: RADIOLOGY
Payer: MEDICARE

## 2020-12-22 PROCEDURE — 77334 RADIATION TREATMENT AID(S): CPT | Performed by: RADIOLOGY

## 2020-12-22 PROCEDURE — 77412 RADIATION TX DELIVERY LVL 3: CPT | Performed by: RADIOLOGY

## 2020-12-23 ENCOUNTER — HOSPITAL ENCOUNTER (OUTPATIENT)
Dept: RADIATION ONCOLOGY | Age: 70
Discharge: HOME | End: 2020-12-23
Attending: RADIOLOGY
Payer: MEDICARE

## 2020-12-23 PROCEDURE — 77412 RADIATION TX DELIVERY LVL 3: CPT | Performed by: RADIOLOGY

## 2020-12-23 PROCEDURE — 77427 RADIATION TX MANAGEMENT X5: CPT | Performed by: RADIOLOGY

## 2020-12-24 ENCOUNTER — HOSPITAL ENCOUNTER (OUTPATIENT)
Dept: RADIATION ONCOLOGY | Age: 70
Discharge: HOME | End: 2020-12-24
Attending: RADIOLOGY
Payer: MEDICARE

## 2020-12-24 PROCEDURE — 77412 RADIATION TX DELIVERY LVL 3: CPT | Performed by: RADIOLOGY

## 2020-12-28 ENCOUNTER — HOSPITAL ENCOUNTER (OUTPATIENT)
Dept: RADIATION ONCOLOGY | Age: 70
Discharge: HOME | End: 2020-12-28
Attending: RADIOLOGY
Payer: MEDICARE

## 2020-12-28 PROCEDURE — 77336 RADIATION PHYSICS CONSULT: CPT | Performed by: RADIOLOGY

## 2020-12-28 PROCEDURE — 77417 THER RADIOLOGY PORT IMAGE(S): CPT | Performed by: RADIOLOGY

## 2020-12-28 PROCEDURE — 77412 RADIATION TX DELIVERY LVL 3: CPT | Performed by: RADIOLOGY

## 2020-12-29 ENCOUNTER — OFFICE VISIT (OUTPATIENT)
Dept: SURGERY | Age: 70
End: 2020-12-29
Payer: MEDICARE

## 2020-12-29 ENCOUNTER — HOSPITAL ENCOUNTER (OUTPATIENT)
Dept: RADIATION ONCOLOGY | Age: 70
Discharge: HOME | End: 2020-12-29
Attending: RADIOLOGY
Payer: MEDICARE

## 2020-12-29 VITALS
BODY MASS INDEX: 31.24 KG/M2 | DIASTOLIC BLOOD PRESSURE: 68 MMHG | WEIGHT: 182 LBS | HEART RATE: 70 BPM | TEMPERATURE: 97 F | SYSTOLIC BLOOD PRESSURE: 136 MMHG

## 2020-12-29 PROCEDURE — 99213 OFFICE O/P EST LOW 20 MIN: CPT | Performed by: SURGERY

## 2020-12-29 PROCEDURE — 77412 RADIATION TX DELIVERY LVL 3: CPT | Performed by: RADIOLOGY

## 2020-12-29 NOTE — PROGRESS NOTES
Moose Osullivan is here for cancer of the left  Breast.  Initial size 2.8 cm. Nodes resected 1. Numbers positive: 0. Stage: T2 N0 M0. (Stage: IIA)   Procedure: lumpectomy w sentinel node biopsy Date: 3/20/2020   Hormonal Therapy:no  Chemotherapy: having now  Radiation Therapy: not yet  [facility]  ONCOTYPE  ER-/SC-/HER2-    Current Evaluation:   Today the patient is basically feeling well. She denies new breast mass, nipple discharge, or breast pain. No new subcutaneous mass, headache, bone pain, new cough, or abdominal pain. She just finished her radiation and has some burning.      Physical Exam:   Generally healthy appearing,  female   Skin: no new subcutaneous mass   Breast: non-tender, no new mass   Lungs clear   Heart RRR  Lymphadenopathy: no new axillary and supraclavicular   Abdomen: non-tender without liver, spleen, kidney, or mass palpable     Bone Density shows osteopenia     Impression:   No evidence of systemic or recurrent breast cancer:   No evidence of second primary breast cancer     Plan: RTC 3 mo  Will need a mammogram in Mar 2021

## 2021-01-11 NOTE — PROGRESS NOTES
Radiation Oncology  Treatment Completion Summary  Encounter Date: 2021 11:06 AM    Ms. Angelic Poon is a 69 y.o. female  : 1950  MRN: [id number]  [facility] Number: [de-identified]      FOLLOW UP PHYSICIANS:   Primary Care: MD Tal Xavier M.D.   Antonio Coley M.D.  Nandini Clemens, [address] [place] [address]      DIAGNOSIS:  Cancer Staging  Malignant neoplasm of upper-outer quadrant of left breast in female, estrogen receptor negative ([facility] [address]  Staging form: Breast, AJCC 8th Edition  - Pathologic stage from 3/9/2020: Stage IIA (pT2, pN0, cM0, G3, ER-, [place]-, HER2-) - Signed by Tal Moreno MD on 2020         TREATMENT COURSE:   Oncology History   Malignant neoplasm of upper-outer quadrant of left breast in female, estrogen receptor negative ([facility] [address]    Initial Diagnosis    Malignant neoplasm of upper-outer quadrant of left breast in female, estrogen receptor negative ([facility] [address]     3/20/2020 Surgery    Left breast lumpectomy with sentinel node sampling. Subsequent reexcision with negative pathology     2020 -  Chemotherapy    Dose dense doxorubicin/cyclophosphamide followed by weekly paclitaxel     2020 - 2020 Radiation    Left Breast: 4240cGy  Lumpectomy bed boost: 10Gy; Total dose= 5240 cGy         HISTORY:  Angelic Poon is a 69 y.o. female with the above referenced diagnosis. Complete details on history of present illness please see my initial consultation note.   She has recently completed a course of adjuvant left radiation therapy and what follows is a description of the treatments she received:    ANATOMIC SITE: Left Breast  BEAM ORIENTATION: Unopposed tangents  ENERGY: 6MV photons  DOSE PER FRACTION: 265cGy  TOTAL DOSE: 4240cGy    ANATOMIC SITE: Lumpectomy bed  BEAM ORIENTATION: Wedged pair  ENERGY: 6/10 MV photons  DOSE PER FRACTION: 200 cGy  TOTAL DOSE: 1000 cGy, cumulative total 5240 cGy    ELAPSED DAYS: 29    TREATMENT TOLERANCE:   Overall, the patient tolerated her radiation therapy quite well. Her energy level was fairly well-maintained throughout the course of her treatments. She did develop mod skin erythema, with some moist desquamation during the end of her treatments for which she was initiated on Silvadene. She did use Miaderm throughout the course her treatments. FOLLOW-UP PLANS:   She is to return to see me in 1 month or sooner as needed.       Electronically signed by Electronically signed by Sharon Doherty MD on 1/11/2021 at 11:06 AM

## 2021-01-14 ENCOUNTER — TELEPHONE (OUTPATIENT)
Dept: SURGERY | Age: 71
End: 2021-01-14

## 2021-01-14 DIAGNOSIS — Z12.31 ENCOUNTER FOR SCREENING MAMMOGRAM FOR MALIGNANT NEOPLASM OF BREAST: Primary | ICD-10-CM

## 2021-01-15 ENCOUNTER — CLINICAL DOCUMENTATION (OUTPATIENT)
Dept: CASE MANAGEMENT | Age: 71
End: 2021-01-15

## 2021-01-15 NOTE — PROGRESS NOTES
Patient called office questioning if they were able to receive the COVID vaccination. Per Dr. Dulce Ozuna, okay for patient to receive vaccination as she is not undergoing any active treatments with our office at this time.

## 2021-02-02 ENCOUNTER — HOSPITAL ENCOUNTER (OUTPATIENT)
Dept: RADIATION ONCOLOGY | Age: 71
Discharge: HOME | End: 2021-02-02
Attending: RADIOLOGY
Payer: MEDICARE

## 2021-02-02 VITALS
HEART RATE: 66 BPM | SYSTOLIC BLOOD PRESSURE: 147 MMHG | HEIGHT: 64 IN | DIASTOLIC BLOOD PRESSURE: 67 MMHG | WEIGHT: 188.8 LBS | RESPIRATION RATE: 18 BRPM | TEMPERATURE: 97 F | OXYGEN SATURATION: 96 % | BODY MASS INDEX: 32.23 KG/M2

## 2021-02-02 ASSESSMENT — PAIN DESCRIPTION - ORIENTATION: ORIENTATION: RIGHT;LEFT

## 2021-02-02 ASSESSMENT — PAIN DESCRIPTION - FREQUENCY: FREQUENCY: CONTINUOUS

## 2021-02-02 ASSESSMENT — PAIN SCALES - GENERAL: PAINLEVEL_OUTOF10: 5

## 2021-02-02 ASSESSMENT — PAIN DESCRIPTION - DESCRIPTORS: DESCRIPTORS: NUMBNESS;ACHING

## 2021-02-02 ASSESSMENT — PAIN DESCRIPTION - LOCATION: LOCATION: LEG;ARM;FOOT

## 2021-02-02 ASSESSMENT — PAIN DESCRIPTION - PAIN TYPE: TYPE: NEUROPATHIC PAIN

## 2021-02-02 NOTE — PROGRESS NOTES
[id number] [facility]  [address] [facility], [address] [facility]  Phone: [phone]  Fax: [phone]    RADIATION ONCOLOGY FOLLOW UP REPORT    PATIENT NAME:  Tio García              : 1950  MEDICAL RECORD NO: [id number]    [facility] NO: [id number]        PROVIDER: Luanne Mei MD      DATE OF SERVICE: 2021     FOLLOW UP PHYSICIANS:   Primary Care: MD Jimbo Koroma M.D.   Ryannfredis Sandracy, [address] [facility]     Heather Renner M.D.  Ross Pruitt., [address]nd-[address] [facility], [address] [place] [address]      DIAGNOSIS: Cancer Staging  Malignant neoplasm of upper-outer quadrant of left breast in female, estrogen receptor negative ([facility] [address]  Staging form: Breast, AJCC 8th Edition  - Pathologic stage from 3/9/2020: Stage IIA (pT2, pN0, cM0, G3, ER-, [place]-, HER2-) - Signed by Jimbo Correa MD on 2020         TREATMENT COURSE:   Oncology History   Malignant neoplasm of upper-outer quadrant of left breast in female, estrogen receptor negative ([facility] [address]    Initial Diagnosis    Malignant neoplasm of upper-outer quadrant of left breast in female, estrogen receptor negative ([facility] [address]     3/20/2020 Surgery    Left breast lumpectomy with sentinel node sampling. Subsequent reexcision with negative pathology     2020 -  Chemotherapy    Dose dense doxorubicin/cyclophosphamide followed by weekly paclitaxel     2020 - 2020 Radiation    Left Breast: 4240cGy  Lumpectomy bed boost: 10Gy; Total dose= 5240 cGy         HPI:   Tio García is a 69 y.o. female who has a history as above, who returns today for her first post treatment follow-up visit. Currently, the patient reports she's been doing well. Her energy level is approximately 80% back to normal.  She continues to have paresthesias from her chemotherapy with tingling and burning sensations of her hands and feet up to her knees. She is scheduled to see Dr. Randal Gonsales next week.   She is for Chest pain 25 tablet 0    clobetasol (OLUX) 0.05 % foam       ketoconazole (NIZORAL) 2 % shampoo       losartan (COZAAR) 50 MG tablet Take 1 tablet by mouth daily      aspirin 81 MG tablet Take 81 mg by mouth daily.  Multiple Vitamin (MULTIVITAMIN PO) Take  by mouth daily. No current facility-administered medications for this encounter. EXAMINATION:   Vitals:    02/02/21 0932   BP: (!) 147/67   Pulse: 66   Resp: 18   Temp: 97 °F (36.1 °C)   SpO2: 96%     The patient is in no acute distress. Neck: Supple no lymphadenopathy is present. Breast examination: LeftNo nipple discharge is elicited. She has well-healed surgical incisions laterally and in the axilla. On palpation no masses are present. Rightno palpable masses. No nipple discharge is elicited. No axillary lymphadenopathy is palpable. She does have a slightly raised homogenous erythematous area predominantly in the right axilla and along the right inframammary fold the only trace erythema of the left axilla and left inframammary fold. No satellite lesions. ASSESSMENT AND PLAN:     Ruben King is a 69 y.o. female who has a history of a stage IIa invasive left-sided breast cancer who is now approximately 1 month after completion of her left breast RT who is doing well at this time without evidence of recurrent or metastatic disease. She is to undergo bilateral mammography in March of this year as scheduled, to see Dr. Nirmala Nolasco next month and Dr. Royal Lawson in March as scheduled, and to return to see me in 1 year or sooner as needed. The patient is to continue to follow CDC's Covid 19 precautionary measures.       Electronically signed by Geena Antonio MD on 2/2/2021 at 9:39 AM

## 2021-02-02 NOTE — PROGRESS NOTES
Asiya Jimenez  2/2/2021    Patient is seen today for follow up. Vitals:    02/02/21 0932   BP: (!) 147/67   Pulse: 66   Resp: 18   Temp: 97 °F (36.1 °C)   SpO2: 96%        Oxygen Therapy  SpO2: 96 %  Pulse Oximeter Device Mode: Continuous  Pulse Oximeter Device Location: Foot  O2 Device: None (Room air)  Skin Assessment: Clean, dry, & intact    Wt Readings from Last 3 Encounters:   02/02/21 188 lb 12.8 oz (85.6 kg)   12/29/20 182 lb (82.6 kg)   12/21/20 183 lb (83 kg)       Pain Assessment  Pain Assessment: 0-10  Pain Level: 5  Patient's Stated Pain Goal: No pain  Pain Type: Neuropathic pain  Pain Location: Leg, Arm, Foot  Pain Orientation: Right, Left  Pain Descriptors: Numbness, Aching  Pain Frequency: Continuous  Clinical Progression: Not changed  Prescribed Narcotics       Allergies   Allergen Reactions    Codeine Hives and Swelling     \"tylenol with codeine is what I had trouble with\"         Current Outpatient Medications   Medication Sig Dispense Refill    silver sulfADIAZINE (SILVADENE) 1 % cream Apply topically twice daily. 50 g 0    DULoxetine (CYMBALTA) 60 MG extended release capsule Take 1 capsule by mouth daily 30 capsule 3    dexamethasone (DECADRON) 2 MG tablet TAKE 4 TABLETS BY MOUTH BEFORE FIRST TREATMENT WITH TAXOL AND THEN TAKE 1 TABLET BY MOUTH ON DAY 2 AND DAY 3 OF WEEKLY TAXOL.       Calcium Carb-Cholecalciferol (CALTRATE 600+D) 600-800 MG-UNIT TABS 1 tab twice per day 180 tablet 3    atenolol (TENORMIN) 100 MG tablet Take 1 tablet by mouth daily 90 tablet 3    atorvastatin (LIPITOR) 10 MG tablet Take 1 tablet by mouth daily 90 tablet 3    triamcinolone (KENALOG) 0.1 % cream       nitroGLYCERIN (NITROSTAT) 0.3 MG SL tablet Place 1 tablet under the tongue every 5 minutes as needed for Chest pain 25 tablet 0    clobetasol (OLUX) 0.05 % foam       ketoconazole (NIZORAL) 2 % shampoo       losartan (COZAAR) 50 MG tablet Take 1 tablet by mouth daily      aspirin 81 MG tablet Take 81 mg by mouth daily.  Multiple Vitamin (MULTIVITAMIN PO) Take  by mouth daily. No current facility-administered medications for this encounter. Additional Comments: Pt here for one month post RAD, Pt states her neuropathy is getting worse and medication prescribed is not helping. Does have shooting pains in left breast sometimes. Also has a rash underneath right breast. States no other questions and concerns.     Electronically signed by Charmayne Field, MA on 2/2/2021 at 9:35 AM

## 2021-02-10 ENCOUNTER — HOSPITAL ENCOUNTER (OUTPATIENT)
Dept: INFUSION THERAPY | Age: 71
Discharge: HOME | End: 2021-02-10
Payer: MEDICARE

## 2021-02-10 ENCOUNTER — OFFICE VISIT (OUTPATIENT)
Dept: ONCOLOGY | Age: 71
End: 2021-02-10
Payer: MEDICARE

## 2021-02-10 VITALS
DIASTOLIC BLOOD PRESSURE: 71 MMHG | HEART RATE: 59 BPM | SYSTOLIC BLOOD PRESSURE: 154 MMHG | OXYGEN SATURATION: 95 % | WEIGHT: 186.8 LBS | BODY MASS INDEX: 31.89 KG/M2 | RESPIRATION RATE: 16 BRPM | TEMPERATURE: 96.5 F | HEIGHT: 64 IN

## 2021-02-10 DIAGNOSIS — Z17.1 MALIGNANT NEOPLASM OF UPPER-OUTER QUADRANT OF LEFT BREAST IN FEMALE, ESTROGEN RECEPTOR NEGATIVE (HCC): Primary | ICD-10-CM

## 2021-02-10 DIAGNOSIS — C50.412 MALIGNANT NEOPLASM OF UPPER-OUTER QUADRANT OF LEFT BREAST IN FEMALE, ESTROGEN RECEPTOR NEGATIVE (HCC): Primary | ICD-10-CM

## 2021-02-10 LAB
ALBUMIN SERPL-MCNC: 4.1 GM/DL (ref 3.4–5)
ALP BLD-CCNC: 69 IU/L (ref 40–129)
ALT SERPL-CCNC: 47 U/L (ref 10–40)
ANION GAP SERPL CALCULATED.3IONS-SCNC: 11 MMOL/L (ref 4–16)
AST SERPL-CCNC: 39 IU/L (ref 15–37)
BASOPHILS ABSOLUTE: 0.1 K/CU MM
BASOPHILS RELATIVE PERCENT: 1.4 % (ref 0–1)
BILIRUB SERPL-MCNC: 0.8 MG/DL (ref 0–1)
BUN BLDV-MCNC: 14 MG/DL (ref 6–23)
CALCIUM SERPL-MCNC: 9.7 MG/DL (ref 8.3–10.6)
CHLORIDE BLD-SCNC: 102 MMOL/L (ref 99–110)
CO2: 26 MMOL/L (ref 21–32)
CREAT SERPL-MCNC: 0.9 MG/DL (ref 0.6–1.1)
DIFFERENTIAL TYPE: ABNORMAL
EOSINOPHILS ABSOLUTE: 0.7 K/CU MM
EOSINOPHILS RELATIVE PERCENT: 10.4 % (ref 0–3)
GFR AFRICAN AMERICAN: >60 ML/MIN/1.73M2
GFR NON-AFRICAN AMERICAN: >60 ML/MIN/1.73M2
GLUCOSE BLD-MCNC: 89 MG/DL (ref 70–99)
HCT VFR BLD CALC: 37.8 % (ref 37–47)
HEMOGLOBIN: 12.6 GM/DL (ref 12.5–16)
LYMPHOCYTES ABSOLUTE: 1.4 K/CU MM
LYMPHOCYTES RELATIVE PERCENT: 22.6 % (ref 24–44)
MCH RBC QN AUTO: 29.9 PG (ref 27–31)
MCHC RBC AUTO-ENTMCNC: 33.3 % (ref 32–36)
MCV RBC AUTO: 89.8 FL (ref 78–100)
MONOCYTES ABSOLUTE: 0.9 K/CU MM
MONOCYTES RELATIVE PERCENT: 14.6 % (ref 0–4)
PDW BLD-RTO: 16 % (ref 11.7–14.9)
PLATELET # BLD: 275 K/CU MM (ref 140–440)
PMV BLD AUTO: 9.1 FL (ref 7.5–11.1)
POTASSIUM SERPL-SCNC: 4.1 MMOL/L (ref 3.5–5.1)
RBC # BLD: 4.21 M/CU MM (ref 4.2–5.4)
SEGMENTED NEUTROPHILS ABSOLUTE COUNT: 3.2 K/CU MM
SEGMENTED NEUTROPHILS RELATIVE PERCENT: 51 % (ref 36–66)
SODIUM BLD-SCNC: 139 MMOL/L (ref 135–145)
TOTAL PROTEIN: 6.6 GM/DL (ref 6.4–8.2)
WBC # BLD: 6.2 K/CU MM (ref 4–10.5)

## 2021-02-10 PROCEDURE — 6360000002 HC RX W HCPCS: Performed by: INTERNAL MEDICINE

## 2021-02-10 PROCEDURE — 2580000003 HC RX 258: Performed by: INTERNAL MEDICINE

## 2021-02-10 PROCEDURE — 80053 COMPREHEN METABOLIC PANEL: CPT

## 2021-02-10 PROCEDURE — 86300 IMMUNOASSAY TUMOR CA 15-3: CPT

## 2021-02-10 PROCEDURE — 99214 OFFICE O/P EST MOD 30 MIN: CPT | Performed by: INTERNAL MEDICINE

## 2021-02-10 PROCEDURE — 85025 COMPLETE CBC W/AUTO DIFF WBC: CPT

## 2021-02-10 PROCEDURE — 36591 DRAW BLOOD OFF VENOUS DEVICE: CPT

## 2021-02-10 PROCEDURE — 99211 OFF/OP EST MAY X REQ PHY/QHP: CPT

## 2021-02-10 RX ORDER — HEPARIN SODIUM (PORCINE) LOCK FLUSH IV SOLN 100 UNIT/ML 100 UNIT/ML
500 SOLUTION INTRAVENOUS PRN
Status: CANCELLED | OUTPATIENT
Start: 2021-02-10

## 2021-02-10 RX ORDER — HEPARIN SODIUM (PORCINE) LOCK FLUSH IV SOLN 100 UNIT/ML 100 UNIT/ML
500 SOLUTION INTRAVENOUS PRN
Status: DISCONTINUED | OUTPATIENT
Start: 2021-02-10 | End: 2021-02-11 | Stop reason: HOSPADM

## 2021-02-10 RX ORDER — SODIUM CHLORIDE 0.9 % (FLUSH) 0.9 %
20 SYRINGE (ML) INJECTION PRN
Status: DISCONTINUED | OUTPATIENT
Start: 2021-02-10 | End: 2021-02-11 | Stop reason: HOSPADM

## 2021-02-10 RX ORDER — LANOLIN ALCOHOL/MO/W.PET/CERES
1000 CREAM (GRAM) TOPICAL DAILY
COMMUNITY

## 2021-02-10 RX ORDER — SODIUM CHLORIDE 0.9 % (FLUSH) 0.9 %
20 SYRINGE (ML) INJECTION PRN
Status: CANCELLED | OUTPATIENT
Start: 2021-02-10

## 2021-02-10 RX ORDER — SODIUM CHLORIDE 0.9 % (FLUSH) 0.9 %
10 SYRINGE (ML) INJECTION PRN
Status: CANCELLED | OUTPATIENT
Start: 2021-02-10

## 2021-02-10 RX ORDER — DULOXETIN HYDROCHLORIDE 60 MG/1
60 CAPSULE, DELAYED RELEASE ORAL DAILY
Qty: 30 CAPSULE | Refills: 3 | Status: SHIPPED | OUTPATIENT
Start: 2021-02-10 | End: 2022-02-08

## 2021-02-10 RX ORDER — GABAPENTIN 300 MG/1
300 CAPSULE ORAL NIGHTLY
Qty: 90 CAPSULE | Refills: 1 | Status: SHIPPED | OUTPATIENT
Start: 2021-02-10 | End: 2022-02-08

## 2021-02-10 RX ADMIN — SODIUM CHLORIDE, PRESERVATIVE FREE 20 ML: 5 INJECTION INTRAVENOUS at 09:22

## 2021-02-10 RX ADMIN — Medication 500 UNITS: at 09:22

## 2021-02-10 NOTE — PROGRESS NOTES
Patient Name: Caity Tesfaye  Patient : 1950  Patient MRN: [id number]     Primary Oncologist: Alicia Reid MD  Referring Provider: Cheyenne Traylor MD     Date of Service: 2/10/2021      Chief Complaint:   Chief Complaint   Patient presents with    Follow-up    Numbness     Neuropathy in hands and feet     Patient Active Problem List:     Malignant neoplasm of female breast      Postoperative nausea     Neutropenic fever      Alopecia (capitis) totalis    HPI:   Ms. Magy Hawk is a 69-year-old very pleasant female with medical history significant for hypertension, hyperlipidemia, coronary artery disease, referred to me on 2020 for evaluation of his clinical stage IIA left breast, high grade, triple negative, invasive ductal carcinoma. She stated that she has echocardiogram and stress test on 2020. It showed normal EF (EF 51%). Concentric left ventricular hypertrophy and she has mild to moderate mitral regurfitation. She was incidentally noted to have left breast mass. She was subsequently evaluated by Dr. Andrea Bauman and she requested diagnostic mammogram. She underwent diagnostic digital bilateral mammogram on 2020 and it showed dumbbell-shaped mass at 2-3 o'clock at middle to posterior depth with associated pleomorphic calcifications, in her left breast. Targeted ultrasound at 2 o'clock at a distance of 7 cm from the nipple demonstrates a dumbbell-shaped hypoechoic mass with angular margins that measures 3.3 x 2.5 x 2.1 cm. Internal vascularity noted along with some posterior acoustic shadowing. No suspicious axillary lymph nodes. She underwent ultrasound guided core biopsy of left breast and clip marker placement on 2020 and final pathology showed invasive ductal carcinoma. Tumor size is at least 12 mm and it is a high grade tumor. ER by IHC is negative (0%), PgR by IHC is negative (0%), Her2 by IHC is equivocal (score 2+) and Her2 by FISH is negative.      Since she is found to have clinical stage IIA left breast invasive ductal carcinoma, she was subsequently referred to me for further evaluation. She was seen by Dr. Sammie Panchal and she underwent left breast lumpectomy and sentinel lymph node biopsy on March 20, 2020. Final pathology showed stage IIA INVASIVE DUCTAL CARCINOMA WITH EXTENSIVE NECROSIS, GRADE 3, 2.8 CM. IN GREATEST DIMENSION. SURGICAL MARGINS ARE POSITIVE FOR MALIGNANCY. Ductal Carcinoma In Situ and lobular Carcinoma In Situ are not present. One sentinel lymph node examined was negative for metastasis. No lymphovascular or dermal lymphovascular invasion. Pathologic Stage Classification: pT2, N0, Mx. Repeat surgery for positive margins was done on March 30, 2020 and there was no residual cancer seen. Since she is found to have stage IIA left breast triple negative invasive ductal carcinoma, I recommend for adjuvant chemotherapy and radiation therapy. She had Echocardiogram on 2/2020 and her EF was 51%. Dose dense chemotherapy with doxorubicin/cyclophosphamide, followed by Paclitaxel was started on May 4, 2020 and she completed it on 10/19/2020. She received adjuvant radiation therapy between November 30, 2020 and December 29, 2020. On February 9, 2021, she presented to me for follow-up. I have been following her for stage IIA left breast triple negative invasive ductal carcinoma and she is status post left breast lumpectomy and sentinel lymph node biopsy, adjuvant chemotherapy and adjuvant radiation therapy. She has been in a close observation since then. She is going to have repeat mammogram in March 2021 and I will follow-up with the results. She does not have any signs or symptoms suggestive for recurrent or metastatic breast cancer on today visit. I will check CBC, CMP and CA 27-29 level today. She has significant neuropathy and it has not been controlled with Cymbalta 60 mg daily.   Since Cymbalta has not been controlled her neuropathy, I recommend her to stop taking it. Instead, I will start gabapentin 300 mg nightly today and I will continue to monitor her neuropathy. Otherwise, I will see her again in 6 months. Besides neuropathy, she does not have any significant symptoms at today visit. Past Medical History  Significant for  1. Hypertension  2. Hyperlipidemia  3. Obesity  4. Coronary artery disease    Surgical History  Significant for  1. Shoulder repair in 2016  2.  section in     Allergies  Tylenol-Codeine    Social History  She denies smoking, alcohol drinking and illicit drug abuse. She is currently living in [facility]. Family History  Father: Hypertension  Mother: Hypertension    Review of Systems: \"Per interval history; otherwise 10 point ROS is negative. \"   Her energy level is good, appetite, and sleep are fair. She doesn't have fever, chills, night sweats, cough, shortness of breath, chest pain, hemoptysis or palpitations. Her bowel and bladder functions are normal. She denies nausea, vomiting, abdominal pain, diarrhea, constipation, dysuria, loss of appetite or weight loss. She doesn't have neuropathy and she denies bleeding or clotting issues. She doesn't have any pain in her body. No anxiety or depression. The rest of the systems are unremarkable.     Vital Signs:  BP (!) 154/71 (Site: Right Upper Arm, Position: Sitting, Cuff Size: Medium Adult)   Pulse 59   Temp 96.5 °F (35.8 °C) (Infrared)   Resp 16   Ht 5' 4\" (1.626 m)   Wt 186 lb 12.8 oz (84.7 kg)   LMP  (LMP Unknown)   SpO2 95%   BMI 32.06 kg/m²     Physical Exam:  CONSTITUTIONAL: awake, no apparent distress, alert, cooperative,    EYES: pupils equal, round and reactive to light, sclera clear and conjunctiva normal  ENT: Normocephalic, without obvious abnormality, atraumatic  NECK: supple, symmetrical, no jugular venous distension and no carotid bruits   HEMATOLOGIC/LYMPHATIC: no cervical, supraclavicular or axillary lymphadenopathy   LUNGS: VBS, no wheezes, no crackles, no rhonchi, no increased work of breathing and clear to auscultation   CARDIOVASCULAR: regular rate and rhythm, normal S1 and S2, no murmur noted  ABDOMEN: soft, non-distended, normal bowel sounds x 4, non-tender, no masses palpated, no hepatosplenomegaly   MUSCULOSKELETAL: full range of motion noted, tone is normal  NEUROLOGIC: awake, alert, oriented to name, place and time. Motor skills grossly intact. SKIN: Normal skin color, texture, turgor and no jaundice.  appears intact   EXTREMITIES: no LE edema, no leg swelling, no cyanosis, no clubbing  BREASTS: Post surgical and radiation changes noted in left breast, no palpable mass in bilateral breasts and axilla     Labs:  Hematology:  Lab Results   Component Value Date    WBC 3.5 (L) 10/19/2020    RBC 3.22 (L) 10/19/2020    HGB 10.6 (L) 10/19/2020    HCT 32.2 (L) 10/19/2020    .0 10/19/2020    MCH 32.9 (H) 10/19/2020    MCHC 32.9 10/19/2020    RDW 16.1 (H) 10/19/2020     10/19/2020    MPV 9.7 10/19/2020    BANDSPCT 31 (H) 06/13/2020    SEGSPCT 43.8 10/19/2020    EOSRELPCT 4.0 (H) 10/19/2020    BASOPCT 3.7 (H) 10/19/2020    LYMPHOPCT 33.3 10/19/2020    MONOPCT 15.2 (H) 10/19/2020    BANDABS 10.20 06/13/2020    SEGSABS 1.5 10/19/2020    EOSABS 0.1 10/19/2020    BASOSABS 0.1 10/19/2020    LYMPHSABS 1.2 10/19/2020    MONOSABS 0.5 10/19/2020    DIFFTYPE AUTOMATED DIFFERENTIAL 10/19/2020    ANISOCYTOSIS 1+ 06/13/2020    POLYCHROM 1+ 06/13/2020    WBCMORP OCCASIONAL 06/10/2020    PLTM FEW 06/13/2020     No results found for: ESR  Chemistry:  Lab Results   Component Value Date     10/19/2020    K 4.5 10/19/2020     10/19/2020    CO2 23 10/19/2020    BUN 17 10/19/2020    CREATININE 0.8 10/19/2020    GLUCOSE 90 10/19/2020    CALCIUM 9.9 10/19/2020    PROT 6.0 (L) 10/19/2020    LABALBU 4.1 10/19/2020    BILITOT 0.8 10/19/2020    ALKPHOS 52 10/19/2020    AST 34 10/19/2020    ALT 68 (H) 10/19/2020 LABGLOM >60 10/19/2020    GFRAA >60 10/19/2020    AGRATIO 1.9 01/29/2020    GLOB 2.3 01/29/2020    MG 1.6 (L) 06/12/2020     No results found for: MMA, LDH, HOMOCYSTEINE  No components found for: LD  Lab Results   Component Value Date    T4FREE 1.0 02/07/2019     Immunology:  Lab Results   Component Value Date    PROT 6.0 (L) 10/19/2020     No results found for: Rian Tomas, KLFLCR  No results found for: B2M  Coagulation Panel:  No results found for: PROTIME, INR, APTT, DDIMER  Anemia Panel:  No results found for: [id number], FOLATE  Tumor Markers:  Lab Results   Component Value Date    LABCA2 28.6 04/30/2020     Observations:  No data recorded       Assessment & Plan:   Clinical stage IIA left breast, high grade, triple negative, invasive ductal carcinoma    PLAN  Ms. Cristin Franks is a 65-year-old very pleasant female who was incidentally found to have left breast mass on Echocardiogram done on February 11, 2020. Further work ups with diagnostic digital mammogram and targeted sonogram of left breast revealed that she has about 3.3 cm mass in her left breast upper outer quadrant. She subsequently had ultrasound guided biopsy of the left breast mass on February 25, 2020 and final pathology revealed clinical stage IIA high grade, triple negative, left breast invasive ductal carcinoma. She was seen by Dr. Karis Thakkar and she underwent left breast lumpectomy and sentinel lymph node biopsy on March 20, 2020. Final pathology showed stage IIA INVASIVE DUCTAL CARCINOMA WITH EXTENSIVE NECROSIS, GRADE 3, 2.8 CM. IN GREATEST DIMENSION. SURGICAL MARGINS ARE POSITIVE FOR MALIGNANCY. Ductal Carcinoma In Situ and lobular Carcinoma In Situ are not present. One sentinel lymph node examined was negative for metastasis. No lymphovascular or dermal lymphovascular invasion. Pathologic Stage Classification: pT2, N0, Mx. Repeat surgery for positive margins was done on March 30, 2020 and there was no residual cancer seen. Since she is found to have stage IIA left breast triple negative invasive ductal carcinoma, I recommend for adjuvant chemotherapy and radiation therapy. She had Echocardiogram on 2/2020 and her EF was 51%. Dose dense chemotherapy with doxorubicin/cyclophosphamide, followed by Paclitaxel was started on May 4, 2020 and she completed it on 10/19/2020. She received adjuvant radiation therapy between November 30, 2020 and December 29, 2020. On February 9, 2021, she presented to me for follow-up. I have been following her for stage IIA left breast triple negative invasive ductal carcinoma and she is status post left breast lumpectomy and sentinel lymph node biopsy, adjuvant chemotherapy and adjuvant radiation therapy. She has been in a close observation since then. She is going to have repeat mammogram in March 2021 and I will follow-up with the results. She does not have any signs or symptoms suggestive for recurrent or metastatic breast cancer on today visit. I will check CBC, CMP and CA 27-29 level today. She has significant neuropathy and it has not been controlled with Cymbalta 60 mg daily. Since Cymbalta has not been controlled her neuropathy, I recommend her to stop taking it. Instead, I will start gabapentin 300 mg nightly today and I will continue to monitor her neuropathy. Otherwise, I will see her again in 6 months. I answered all her questions and concerns for today. I recommend her to follow-up with primary care physician, Dr. Alex Kenny on regular basis. Recent imaging and labs were reviewed and discussed with the patient.

## 2021-02-12 LAB — CA 27.29: 36.5 U/ML (ref 0–40)

## 2021-03-22 ENCOUNTER — TELEPHONE (OUTPATIENT)
Dept: SURGERY | Age: 71
End: 2021-03-22

## 2021-03-22 DIAGNOSIS — Z85.3 HISTORY OF LEFT BREAST CANCER: Primary | ICD-10-CM

## 2021-03-22 DIAGNOSIS — Z85.3 HISTORY OF BREAST CANCER: ICD-10-CM

## 2021-03-22 NOTE — TELEPHONE ENCOUNTER
Hansford Caldron from [facility] states order should be diagnostic since patient had lumpectomy last year.

## 2021-03-23 ENCOUNTER — HOSPITAL ENCOUNTER (OUTPATIENT)
Dept: WOMENS IMAGING | Age: 71
Discharge: HOME | End: 2021-03-23
Payer: MEDICARE

## 2021-03-23 DIAGNOSIS — Z85.3 HISTORY OF LEFT BREAST CANCER: ICD-10-CM

## 2021-03-23 DIAGNOSIS — Z12.31 ENCOUNTER FOR SCREENING MAMMOGRAM FOR MALIGNANT NEOPLASM OF BREAST: ICD-10-CM

## 2021-03-23 PROCEDURE — G0279 TOMOSYNTHESIS, MAMMO: HCPCS

## 2021-03-30 ENCOUNTER — OFFICE VISIT (OUTPATIENT)
Dept: SURGERY | Age: 71
End: 2021-03-30
Payer: MEDICARE

## 2021-03-30 VITALS
DIASTOLIC BLOOD PRESSURE: 57 MMHG | WEIGHT: 186 LBS | TEMPERATURE: 97.2 F | BODY MASS INDEX: 31.93 KG/M2 | SYSTOLIC BLOOD PRESSURE: 152 MMHG | RESPIRATION RATE: 14 BRPM | HEART RATE: 60 BPM

## 2021-03-30 DIAGNOSIS — C50.912 INFILTRATING DUCTAL CARCINOMA OF LEFT BREAST (HCC): Primary | ICD-10-CM

## 2021-03-30 PROCEDURE — 99213 OFFICE O/P EST LOW 20 MIN: CPT | Performed by: SURGERY

## 2021-06-29 ENCOUNTER — OFFICE VISIT (OUTPATIENT)
Dept: SURGERY | Age: 71
End: 2021-06-29
Payer: MEDICARE

## 2021-06-29 VITALS
OXYGEN SATURATION: 98 % | BODY MASS INDEX: 30.32 KG/M2 | HEIGHT: 64 IN | DIASTOLIC BLOOD PRESSURE: 64 MMHG | WEIGHT: 177.6 LBS | TEMPERATURE: 97.5 F | SYSTOLIC BLOOD PRESSURE: 140 MMHG | HEART RATE: 55 BPM

## 2021-06-29 DIAGNOSIS — C50.912 INFILTRATING DUCTAL CARCINOMA OF LEFT BREAST (HCC): Primary | ICD-10-CM

## 2021-06-29 PROCEDURE — 99213 OFFICE O/P EST LOW 20 MIN: CPT | Performed by: SURGERY

## 2021-06-29 PROCEDURE — 93702 BIS XTRACELL FLUID ANALYSIS: CPT | Performed by: SURGERY

## 2021-07-21 ENCOUNTER — OFFICE VISIT (OUTPATIENT)
Dept: FAMILY MEDICINE CLINIC | Age: 71
End: 2021-07-21
Payer: MEDICARE

## 2021-07-21 VITALS
TEMPERATURE: 97.1 F | OXYGEN SATURATION: 98 % | SYSTOLIC BLOOD PRESSURE: 124 MMHG | BODY MASS INDEX: 29.98 KG/M2 | HEIGHT: 64 IN | DIASTOLIC BLOOD PRESSURE: 64 MMHG | HEART RATE: 56 BPM | WEIGHT: 175.6 LBS

## 2021-07-21 DIAGNOSIS — E66.9 OBESITY (BMI 30.0-34.9): ICD-10-CM

## 2021-07-21 DIAGNOSIS — T45.1X5A CHEMOTHERAPY-INDUCED NEUROPATHY (HCC): ICD-10-CM

## 2021-07-21 DIAGNOSIS — E78.5 HYPERLIPIDEMIA, UNSPECIFIED HYPERLIPIDEMIA TYPE: ICD-10-CM

## 2021-07-21 DIAGNOSIS — M85.89 OSTEOPENIA OF MULTIPLE SITES: ICD-10-CM

## 2021-07-21 DIAGNOSIS — G62.0 CHEMOTHERAPY-INDUCED NEUROPATHY (HCC): ICD-10-CM

## 2021-07-21 DIAGNOSIS — I10 ESSENTIAL HYPERTENSION: Primary | ICD-10-CM

## 2021-07-21 LAB
CHOLESTEROL, TOTAL: 166 MG/DL (ref 0–199)
HDLC SERPL-MCNC: 72 MG/DL (ref 40–60)
LDL CHOLESTEROL CALCULATED: 70 MG/DL
TRIGL SERPL-MCNC: 121 MG/DL (ref 0–150)
VLDLC SERPL CALC-MCNC: 24 MG/DL

## 2021-07-21 PROCEDURE — 3288F FALL RISK ASSESSMENT DOCD: CPT | Performed by: FAMILY MEDICINE

## 2021-07-21 PROCEDURE — 99214 OFFICE O/P EST MOD 30 MIN: CPT | Performed by: FAMILY MEDICINE

## 2021-07-21 PROCEDURE — 36415 COLL VENOUS BLD VENIPUNCTURE: CPT | Performed by: FAMILY MEDICINE

## 2021-07-21 RX ORDER — ATENOLOL 100 MG/1
100 TABLET ORAL DAILY
Qty: 90 TABLET | Refills: 3 | Status: SHIPPED | OUTPATIENT
Start: 2021-07-21 | End: 2022-07-14 | Stop reason: SDUPTHER

## 2021-07-21 RX ORDER — ATORVASTATIN CALCIUM 10 MG/1
10 TABLET, FILM COATED ORAL DAILY
Qty: 90 TABLET | Refills: 3 | Status: SHIPPED | OUTPATIENT
Start: 2021-07-21 | End: 2022-07-14 | Stop reason: SDUPTHER

## 2021-07-21 ASSESSMENT — PATIENT HEALTH QUESTIONNAIRE - PHQ9
2. FEELING DOWN, DEPRESSED OR HOPELESS: 0
SUM OF ALL RESPONSES TO PHQ QUESTIONS 1-9: 0
SUM OF ALL RESPONSES TO PHQ9 QUESTIONS 1 & 2: 0
SUM OF ALL RESPONSES TO PHQ QUESTIONS 1-9: 0
SUM OF ALL RESPONSES TO PHQ QUESTIONS 1-9: 0
1. LITTLE INTEREST OR PLEASURE IN DOING THINGS: 0

## 2021-07-21 ASSESSMENT — ENCOUNTER SYMPTOMS
SHORTNESS OF BREATH: 0
CHEST TIGHTNESS: 0

## 2021-07-21 NOTE — PATIENT INSTRUCTIONS
Need help scheduling your covid vaccine? [address] [phone]       PLEASE BRING YOUR MEDICATIONS TO ALL APPOINTMENTS    The diagnoses and medications listed in this after visit summary may not be accurate at the time of check out. Please check MY CHART in 28-48 hours for possible corrections. Late cancellation policy: So that we can better accommodate people who are sick, please give our office 24 hour notice for an appointment cancellation. Thank you. Missed appointments: Your care is very important to us. It is important that you keep your scheduled appointments. Multiple missed appointments will lead to a dismissal from the office. Later arrival policy: If you are more than 10 minutes late for your appointment, you will be asked to re-schedule. Please allow 5-7 business days for paperwork to be processed. It is important that you check your MY Chart messages, as they include appointment reminders, test results, and other important information. If you have forgotten your password, please call [phone]. HERE ARE SOME LIFE CHANGING TIPS      1. Take your blood pressure medications at bedtime. This reduces your chance of cardiovascular event by half  2. Fever in kids:  Give both Tylenol and Ibuprofen at the same time rather than staggering them which is confusing  3. Follow these tips to reduce childhood obesity: Reduce unnecessary exposure to antibiotics, consume whole milk instead of skim milk, watch public TV instead of regular TV (less exposure to junk food commercials), and reduce traumatic experiences. 4. 1 egg per day is good for your heart  5. Alternate day fasting does promote weight loss. Skipping breakfast increases your risk of obesity  6. Artificially sweetened drinks increase all cause mortality (strokes, BMI, cardiovascular)  7. Kale consumption can reduce onset of dementia  8.  Walking at least 8000 steps per day and resistance exercise 2-3 x per week are good for your heart  9.  Brushing teeth 3 times per day can decrease chance of getting diabetes

## 2021-07-21 NOTE — PROGRESS NOTES
Patient ID: Moy Darling 1950    . Chief Complaint   Patient presents with    Hypertension    Hyperlipidemia    Referral - General     would like a script for massage therapy for the neuropathy in her hands and feet that she has from chemo. Hypertension  This is a chronic problem. The current episode started more than 1 year ago. The problem is unchanged. The problem is controlled. Pertinent negatives include no palpitations or shortness of breath. Risk factors for coronary artery disease include post-menopausal state and obesity. Past treatments include angiotensin blockers. There are no compliance problems. Hyperlipidemia  This is a chronic problem. The current episode started more than 1 year ago. The problem is controlled. Pertinent negatives include no shortness of breath. Current antihyperlipidemic treatment includes statins. Risk factors for coronary artery disease include post-menopausal, hypertension and obesity. Coronary Artery Disease  Presents for follow-up visit. Pertinent negatives include no chest tightness, leg swelling, palpitations or shortness of breath. Risk factors include hyperlipidemia. Chemotherapy induced neuropathy: in fingers and feet. Asking for referral to massage therapy for this. Has been shown some exercises that she can do    Hx breast CA: see above. Sees oncologist and surgeon soon. Review of Systems   Respiratory: Negative for chest tightness and shortness of breath. Cardiovascular: Negative for palpitations and leg swelling. Patient Active Problem List   Diagnosis    Essential hypertension    Hyperlipidemia    History of colon polyps    Obesity (BMI 30.0-34. 9)    Osteopenia of multiple sites    Coronary artery disease involving native coronary artery of native heart without angina pectoris    Hepatic steatosis    Malignant neoplasm of upper-outer quadrant of left breast in female, estrogen receptor negative ([address]    Postoperative nausea    Alopecia (capitis) totalis    Diarrhea due to drug    Chemotherapy-induced neuropathy [facility])       Past Surgical History:   Procedure Laterality Date    BREAST BIOPSY Left 2020    BREAST BIOPSY Left 2020    sentinal node, partial mastectomy left    BREAST LUMPECTOMY Left 3/20/2020    LEFT BREAST LUMPECTOMY WITH NEEDLE LOC AND SENTINEL NODE BIOPSY performed by Jabari Nunes MD at [facility] Left 3/30/2020    BREAST LESION RE EXCISION LEFT LUMPECTOMY SITE performed by Jabari Nunes MD at [address] TEST  2009    treadmill, Dr. Rivero Grain Right     right     SECTION      COLONOSCOPY          COLONOSCOPY  2015    diverticulosis, internal hemorroids. repeat 5 years. Thaddeus Lesch PORT SURGERY Right 2020    PORT SURGERY Right 3/20/2020    PORT INSERTION performed by Jabari Nunes MD at [address] Right 2016    TUBAL LIGATION      US BREAST NEEDLE BIOPSY LEFT Left 2021    US BREAST NEEDLE BIOPSY LEFT 2021 Severo Dolin,  [facility]       Family History   Problem Relation Age of Onset    Stroke Father     Coronary Art Dis Father     High Blood Pressure Father     Heart Attack Brother 72    High Blood Pressure Mother     No Known Problems Sister        Current Outpatient Medications on File Prior to Visit   Medication Sig Dispense Refill    vitamin B-12 (CYANOCOBALAMIN) 1000 MCG tablet Take 1,000 mcg by mouth daily      ketoconazole (NIZORAL) 2 % shampoo       losartan (COZAAR) 50 MG tablet Take 1 tablet by mouth daily      aspirin 81 MG tablet Take 81 mg by mouth daily.  Multiple Vitamin (MULTIVITAMIN PO) Take  by mouth daily.  gabapentin (NEURONTIN) 300 MG capsule Take 1 capsule by mouth nightly for 180 days.  Intended supply: 90 days (Patient not taking: Reported on 2021) 90 capsule 1    DULoxetine (CYMBALTA) 60 MG extended release capsule Take 1 capsule by mouth daily 30 capsule 3    silver sulfADIAZINE (SILVADENE) 1 % cream Apply topically twice daily. 50 g 0    triamcinolone (KENALOG) 0.1 % cream       nitroGLYCERIN (NITROSTAT) 0.3 MG SL tablet Place 1 tablet under the tongue every 5 minutes as needed for Chest pain 25 tablet 0    clobetasol (OLUX) 0.05 % foam        No current facility-administered medications on file prior to visit. Objective:         Physical Exam  Vitals and nursing note reviewed. Constitutional:       Appearance: She is well-developed. HENT:      Head: Normocephalic and atraumatic. Cardiovascular:      Rate and Rhythm: Normal rate and regular rhythm. Heart sounds: Normal heart sounds, S1 normal and S2 normal.   Pulmonary:      Effort: Pulmonary effort is normal. No respiratory distress. Breath sounds: Normal breath sounds. No wheezing. Musculoskeletal:      Cervical back: Neck supple. Right lower leg: No edema. Left lower leg: No edema. Skin:     General: Skin is warm and dry. Neurological:      Mental Status: She is alert. Vitals:    07/21/21 0759   BP: 124/64   Pulse: 56   Temp: 97.1 °F (36.2 °C)   TempSrc: Infrared   SpO2: 98%   Weight: 175 lb 9.6 oz (79.7 kg)   Height: 5' 4\" (1.626 m)     Body mass index is 30.14 kg/m².      Wt Readings from Last 3 Encounters:   09/28/21 174 lb (78.9 kg)   08/11/21 174 lb (78.9 kg)   07/21/21 175 lb 9.6 oz (79.7 kg)     BP Readings from Last 3 Encounters:   09/28/21 (!) 147/72   08/31/21 (!) 145/64   08/11/21 (!) 160/68          Results for orders placed or performed in visit on 07/21/21   Lipid Panel   Result Value Ref Range    Cholesterol, Total 166 0 - 199 mg/dL    Triglycerides 121 0 - 150 mg/dL    HDL 72 (H) 40 - 60 mg/dL    LDL Calculated 70 <100 mg/dL    VLDL Cholesterol Calculated 24 Not Established mg/dL     The 10-year ASCVD risk score (Tracie Rivas., et al., 2013) is: 18.7%    Values used to calculate the score:      Age: 66 years      Sex: Female      Is Non- : No      Diabetic: No      Tobacco smoker: No      Systolic Blood Pressure: 598 mmHg      Is BP treated: Yes      HDL Cholesterol: 72 mg/dL      Total Cholesterol: 166 mg/dL  Lab Review   Hospital Outpatient Visit on 02/10/2021   Component Date Value    CA 27.29 02/10/2021 36.5     Sodium 02/10/2021 139     Potassium 02/10/2021 4.1     Chloride 02/10/2021 102     CO2 02/10/2021 26     BUN 02/10/2021 14     CREATININE 02/10/2021 0.9     Glucose 02/10/2021 89     Calcium 02/10/2021 9.7     Albumin 02/10/2021 4.1     Total Protein 02/10/2021 6.6     Total Bilirubin 02/10/2021 0.8     ALT 02/10/2021 47*    AST 02/10/2021 39*    Alkaline Phosphatase 02/10/2021 69     GFR Non- 02/10/2021 >60     GFR  02/10/2021 >60     Anion Gap 02/10/2021 11     WBC 02/10/2021 6.2     RBC 02/10/2021 4.21     Hemoglobin 02/10/2021 12.6     Hematocrit 02/10/2021 37.8     MCV 02/10/2021 89.8     MCH 02/10/2021 29.9     MCHC 02/10/2021 33.3     RDW 02/10/2021 16.0*    Platelets 30/30/1915 275     MPV 02/10/2021 9.1     Differential Type 02/10/2021 AUTOMATED DIFFERENTIAL     Segs Relative 02/10/2021 51.0     Lymphocytes % 02/10/2021 22.6*    Monocytes % 02/10/2021 14.6*    Eosinophils % 02/10/2021 10.4*    Basophils % 02/10/2021 1.4*    Segs Absolute 02/10/2021 3.2     Lymphocytes Absolute 02/10/2021 1.4     Monocytes Absolute 02/10/2021 0.9     Eosinophils Absolute 02/10/2021 0.7     Basophils Absolute 02/10/2021 0.1            Assessment:       Diagnosis Orders   1. Essential hypertension  atenolol (TENORMIN) 100 MG tablet   2. Osteopenia of multiple sites  Calcium Carb-Cholecalciferol (CALTRATE 600+D) 600-800 MG-UNIT TABS   3. Hyperlipidemia, unspecified hyperlipidemia type  atorvastatin (LIPITOR) 10 MG tablet    Lipid Panel   4. Chemotherapy-induced neuropathy [facility])  External Referral To Massage Therapy   5. Obesity (BMI 30.0-34. 9)             Plan:      HTN stable. Continue atenolol    REcommend making fruits and vegies half of what she eats    Re-check in 6 months    Rhonda Sheth for order for massage therapy          Return in about 6 months (around 1/21/2022) for HTN, Hyperlipid.

## 2021-08-04 ENCOUNTER — VIRTUAL VISIT (OUTPATIENT)
Dept: FAMILY MEDICINE CLINIC | Age: 71
End: 2021-08-04
Payer: MEDICARE

## 2021-08-04 DIAGNOSIS — Z00.00 ROUTINE GENERAL MEDICAL EXAMINATION AT A HEALTH CARE FACILITY: Primary | ICD-10-CM

## 2021-08-04 PROCEDURE — G0439 PPPS, SUBSEQ VISIT: HCPCS | Performed by: FAMILY MEDICINE

## 2021-08-04 SDOH — ECONOMIC STABILITY: FOOD INSECURITY: WITHIN THE PAST 12 MONTHS, THE FOOD YOU BOUGHT JUST DIDN'T LAST AND YOU DIDN'T HAVE MONEY TO GET MORE.: NEVER TRUE

## 2021-08-04 SDOH — ECONOMIC STABILITY: FOOD INSECURITY: WITHIN THE PAST 12 MONTHS, YOU WORRIED THAT YOUR FOOD WOULD RUN OUT BEFORE YOU GOT MONEY TO BUY MORE.: NEVER TRUE

## 2021-08-04 ASSESSMENT — PATIENT HEALTH QUESTIONNAIRE - PHQ9
SUM OF ALL RESPONSES TO PHQ QUESTIONS 1-9: 0
2. FEELING DOWN, DEPRESSED OR HOPELESS: 0
SUM OF ALL RESPONSES TO PHQ9 QUESTIONS 1 & 2: 0
SUM OF ALL RESPONSES TO PHQ QUESTIONS 1-9: 0
1. LITTLE INTEREST OR PLEASURE IN DOING THINGS: 0
SUM OF ALL RESPONSES TO PHQ QUESTIONS 1-9: 0

## 2021-08-04 ASSESSMENT — SOCIAL DETERMINANTS OF HEALTH (SDOH): HOW HARD IS IT FOR YOU TO PAY FOR THE VERY BASICS LIKE FOOD, HOUSING, MEDICAL CARE, AND HEATING?: NOT HARD AT ALL

## 2021-08-04 ASSESSMENT — LIFESTYLE VARIABLES: HOW OFTEN DO YOU HAVE A DRINK CONTAINING ALCOHOL: 0

## 2021-08-04 NOTE — PROGRESS NOTES
Medicare Annual Wellness Visit  Name: Siddharth Lennon Date: 2021   MRN: [id number] Sex: Female   Age: 69 y.o. Ethnicity: Non- / Non    : 1950 Race: White (non-)      Moy Darling is here for Medicare AWV    Screenings for behavioral, psychosocial and functional/safety risks, and cognitive dysfunction are all negative except as indicated below. These results, as well as other patient data from the [address] form, are documented in Flowsheets linked to this Encounter. Allergies   Allergen Reactions    Codeine Hives and Swelling     \"tylenol with codeine is what I had trouble with\"        Prior to Visit Medications    Medication Sig Taking? Authorizing Provider   Calcium Carb-Cholecalciferol (CALTRATE 600+D) 600-800 MG-UNIT TABS 1 tab twice per day Yes Yaya Oseguera MD   atenolol (TENORMIN) 100 MG tablet Take 1 tablet by mouth daily Yes Yaya Oseguera MD   atorvastatin (LIPITOR) 10 MG tablet Take 1 tablet by mouth daily Yes Yaya Oseguera MD   vitamin B-12 (CYANOCOBALAMIN) 1000 MCG tablet Take 1,000 mcg by mouth daily Yes Historical Provider, MD   losartan (COZAAR) 50 MG tablet Take 1 tablet by mouth daily Yes Nisha Perez MD   aspirin 81 MG tablet Take 81 mg by mouth daily. Yes Historical Provider, MD   Multiple Vitamin (MULTIVITAMIN PO) Take  by mouth daily. Yes Historical Provider, MD   gabapentin (NEURONTIN) 300 MG capsule Take 1 capsule by mouth nightly for 180 days. Intended supply: 90 days  Patient not taking: Reported on 2021  Melonie Barnard MD   DULoxetine (CYMBALTA) 60 MG extended release capsule Take 1 capsule by mouth daily  Patient not taking: Reported on 2021  Melonie Barnard MD   silver sulfADIAZINE (SILVADENE) 1 % cream Apply topically twice daily.   Patient not taking: Reported on 2021  Donna Wilson MD   triamcinolone (KENALOG) 0.1 % cream   CHARI Palumbo - CNP   nitroGLYCERIN (NITROSTAT) 0.3 MG SL tablet Place 1 tablet under the tongue every 5 minutes as needed for Chest pain  Patient not taking: Reported on 2021  Reta Tam MD   clobetasol (OLUX) 0.05 % foam   Mohamud Noble   ketoconazole (NIZORAL) 2 % shampoo   Mohamud Noble       Past Medical History:   Diagnosis Date    Breast cancer ([address] 2020    Cancer ([address]     left breast cancer\"found after bx 2020\" following with Dr Divya Gonzalez Colonic polyp     History of external beam radiation therapy     Left Breast 5,240 cGy [facility]    History of therapeutic radiation     Hx antineoplastic chemo     Hx of motion sickness     Hyperlipidemia 2009    Hypertension     \"on medication 5+ yrs \" follow with dr Tino Thacker MI (myocardial infarction)     \"they said my ekg showed I  had heart attack over 6 yrs ago but never knew I had one\"    PONV (postoperative nausea and vomiting)     hx of motion sickness    Wears glasses        Past Surgical History:   Procedure Laterality Date    BREAST BIOPSY Left 2020    BREAST BIOPSY Left 2020    sentinal node, partial mastectomy left    BREAST LUMPECTOMY Left 3/20/2020    LEFT BREAST LUMPECTOMY WITH NEEDLE LOC AND SENTINEL NODE BIOPSY performed by Sean Armendariz MD at [address] [place] [address] Left 3/30/2020    BREAST LESION RE EXCISION LEFT LUMPECTOMY SITE performed by Sean Armendariz MD at [address] TEST  2009    treadmill, Dr. Miguel Toscano Right     right     SECTION      COLONOSCOPY  2009 [facility]    COLONOSCOPY  2015    diverticulosis, internal hemorroids.  repeat 5 years. Chelys Irving PORT SURGERY Right 2020    PORT SURGERY Right 3/20/2020    PORT INSERTION performed by Sean Armendariz MD at [address] [place] [address] Right 2016    TUBAL LIGATION         Family History   Problem Relation Age of Onset    Stroke Father     Coronary Art Dis Father    [address] [place] [address] High Blood Pressure Father     Heart Attack Brother 72    High Blood Pressure Mother     No Known Problems Sister        CareTeam (Including outside providers/suppliers regularly involved in providing care):   Patient Care Team:  Lg Barraza MD as PCP - Ousmane Freire MD as PCP - [facility] Empaneled Provider  Dr Karilyn Pallas (Cardiology)  Angela Martinez MD (Internal Medicine)  Gilberto Ervin MD as Consulting Physician (Cardiology)  Mark Matta, [address] as Consulting Physician (Dermatology)  Chance Dial RN as Nurse Navigator (Oncology)  Bandar Lynch MD as Consulting Physician (Hematology and Oncology)    Wt Readings from Last 3 Encounters:   07/21/21 175 lb 9.6 oz (79.7 kg)   06/29/21 177 lb 9.6 oz (80.6 kg)   03/30/21 186 lb (84.4 kg)     There were no vitals filed for this visit. There is no height or weight on file to calculate BMI. Based upon direct observation of the patient, evaluation of cognition reveals recent and remote memory intact. Patient's complete Health Risk Assessment and screening values have been reviewed and are found in Flowsheets. The following problems were reviewed today and where indicated follow up appointments were made and/or referrals ordered. Positive Risk Factor Screenings with Interventions:          General Health and ACP:  General  In general, how would you say your health is?: Good  In the past 7 days, have you experienced any of the following?  New or Increased Pain, New or Increased Fatigue, Loneliness, Social Isolation, Stress or Anger?: None of These  Do you get the social and emotional support that you need?: Yes  Do you have a Living Will?: (!) No  Advance Directives     Power of [address] Will ACP-Advance Directive ACP-Power of     Not on File Not on File Not on File Not on File      General Health Risk Interventions:  · No Living Will: Advance Care Planning addressed with patient today    Health Habits/Nutrition:  Health Habits/Nutrition  Do you exercise for at least 20 minutes 2-3 times per week?: Yes  Have you lost any weight without trying in the past 3 months?: No  Do you eat only one meal per day?: No  Have you seen the dentist within the past year?: (!) No     Health Habits/Nutrition Interventions:  · Dental exam overdue:  patient encouraged to make appointment with his/her dentist    Hearing/Vision:  No exam data present  Hearing/Vision  Do you or your family notice any trouble with your hearing that hasn't been managed with hearing aids?: (!) Yes  Do you have difficulty driving, watching TV, or doing any of your daily activities because of your eyesight?: No  Have you had an eye exam within the past year?: (!) No  Hearing/Vision Interventions:  · Hearing concerns:  patient declines any further evaluation/treatment for hearing issues  · Vision concerns:  patient encouraged to make appointment with his/her eye specialist      Personalized Preventive Plan   Current Health Maintenance Status  Immunization History   Administered Date(s) Administered    COVID-19, Barker Peter, PF, 30mcg/0.3mL 02/02/2021, 02/23/2021    Influenza 09/25/2012    Influenza A ([id number]) Vaccine PF IM 01/15/2010    Influenza Virus Vaccine 11/15/2011, 10/02/2014, 11/06/2015    Influenza, High Dose (Fluzone 65 yrs and older) 12/13/2016, 10/24/2017, 10/08/2018, 11/16/2020    Influenza, High-dose, Silvana [place], 65 yrs +, IM (Fluzone) 11/16/2020    Influenza, Quadv, IM, (6 mo and older Fluzone, Flulaval, Fluarix and 3 yrs and older Afluria) 10/02/2013    Influenza, Triv, inactivated, subunit, adjuvanted, IM (Fluad 65 yrs and older) 09/25/2019    Pneumococcal Conjugate 13-valent ([id number]) 06/21/2016    Pneumococcal Polysaccharide ([id number]) 12/29/2014    Tdap (Boostrix, Adacel) 05/26/2009, 08/07/2019    Zoster Live (Zostavax) 05/13/2013    Zoster Recombinant (Shingrix) 09/19/2019, 11/22/2019        Health Maintenance   Topic Date Due    Annual Wellness Visit (AWV)  Never done    Flu vaccine (1) 09/01/2021    Potassium monitoring  02/10/2022    Creatinine monitoring  02/10/2022    Breast cancer screen  03/23/2022    Lipid screen  07/21/2022    Colon cancer screen colonoscopy  03/08/2026    DTaP/Tdap/Td vaccine (3 - Td or Tdap) 08/07/2029    DEXA (modify frequency per FRAX score)  Completed    Pneumococcal 65+ years Vaccine  Completed    COVID-19 Vaccine  Completed    Hepatitis C screen  Completed    Hepatitis A vaccine  Aged Out    Hepatitis B vaccine  Aged Out    Hib vaccine  Aged Out    Meningococcal (ACWY) vaccine  Aged Out     Recommendations for [employer]) Due: see orders and patient instructions/AVS.    Unable to obtain 3 vital signs due to patient not having equipment to take blood pressure/temperature. Recommended screening schedule for the next 5-10 years is provided to the patient in written form: see Patient Instructions/AVS.    Pramod CHEW LPN, 3/1/[birth year], performed the documented evaluation under the direct supervision of the attending physician. Corrinne Kennedy, was evaluated through a synchronous (real-time) audio-video encounter. The patient (or guardian if applicable) is aware that this is a billable service. Verbal consent to proceed has been obtained within the past 12 months. The visit was conducted pursuant to the emergency declaration under the [address] [place] [address], [address] [place] [address] authority and the [employer] General Act. Patient identification was verified, and a caregiver was present when appropriate. The patient was located in the state of [place], where the provider was credentialed to provide care. Total time spent for this encounter: Not billed by time    --Pramod Khalil LPN on 7/8/8998 at 19:42 AM    An electronic signature was used to authenticate this note.

## 2021-08-04 NOTE — PATIENT INSTRUCTIONS
Personalized Preventive Plan for Chris Sherman - 8/4/2021  Medicare offers a range of preventive health benefits. Some of the tests and screenings are paid in full while other may be subject to a deductible, co-insurance, and/or copay. Some of these benefits include a comprehensive review of your medical history including lifestyle, illnesses that may run in your family, and various assessments and screenings as appropriate. After reviewing your medical record and screening and assessments performed today your provider may have ordered immunizations, labs, imaging, and/or referrals for you. A list of these orders (if applicable) as well as your Preventive Care list are included within your After Visit Summary for your review. Other Preventive Recommendations:    · A preventive eye exam performed by an eye specialist is recommended every 1-2 years to screen for glaucoma; cataracts, macular degeneration, and other eye disorders. · A preventive dental visit is recommended every 6 months. · Try to get at least 150 minutes of exercise per week or 10,000 steps per day on a pedometer . · Order or download the FREE \"Exercise & Physical Activity: Your Everyday Guide\" from [employer]. Call 7-539.492.7636 or search [employer] online. · You need 3590-9473 mg of calcium and 7083-3170 IU of vitamin D per day. It is possible to meet your calcium requirement with diet alone, but a vitamin D supplement is usually necessary to meet this goal.  · When exposed to the sun, use a sunscreen that protects against both UVA and UVB radiation with an SPF of 30 or greater. Reapply every 2 to 3 hours or after sweating, drying off with a towel, or swimming. · Always wear a seat belt when traveling in a car. Always wear a helmet when riding a bicycle or motorcycle.

## 2021-08-11 ENCOUNTER — HOSPITAL ENCOUNTER (OUTPATIENT)
Dept: INFUSION THERAPY | Age: 71
End: 2021-08-11
Payer: MEDICARE

## 2021-08-11 ENCOUNTER — HOSPITAL ENCOUNTER (OUTPATIENT)
Dept: INFUSION THERAPY | Age: 71
Discharge: HOME | End: 2021-08-11
Payer: MEDICARE

## 2021-08-11 ENCOUNTER — OFFICE VISIT (OUTPATIENT)
Dept: ONCOLOGY | Age: 71
End: 2021-08-11
Payer: MEDICARE

## 2021-08-11 VITALS
HEART RATE: 57 BPM | SYSTOLIC BLOOD PRESSURE: 160 MMHG | HEIGHT: 64 IN | BODY MASS INDEX: 29.71 KG/M2 | WEIGHT: 174 LBS | TEMPERATURE: 98 F | DIASTOLIC BLOOD PRESSURE: 68 MMHG | RESPIRATION RATE: 18 BRPM | OXYGEN SATURATION: 99 %

## 2021-08-11 DIAGNOSIS — Z17.1 MALIGNANT NEOPLASM OF UPPER-OUTER QUADRANT OF LEFT BREAST IN FEMALE, ESTROGEN RECEPTOR NEGATIVE (HCC): Primary | ICD-10-CM

## 2021-08-11 DIAGNOSIS — C50.412 MALIGNANT NEOPLASM OF UPPER-OUTER QUADRANT OF LEFT BREAST IN FEMALE, ESTROGEN RECEPTOR NEGATIVE (HCC): Primary | ICD-10-CM

## 2021-08-11 LAB
ALBUMIN SERPL-MCNC: 4.7 GM/DL (ref 3.4–5)
ALP BLD-CCNC: 71 IU/L (ref 40–129)
ALT SERPL-CCNC: 30 U/L (ref 10–40)
ANION GAP SERPL CALCULATED.3IONS-SCNC: 13 MMOL/L (ref 4–16)
AST SERPL-CCNC: 29 IU/L (ref 15–37)
BASOPHILS ABSOLUTE: 0.1 K/CU MM
BASOPHILS RELATIVE PERCENT: 1.3 % (ref 0–1)
BILIRUB SERPL-MCNC: 1.1 MG/DL (ref 0–1)
BUN BLDV-MCNC: 15 MG/DL (ref 6–23)
CALCIUM SERPL-MCNC: 9.6 MG/DL (ref 8.3–10.6)
CHLORIDE BLD-SCNC: 102 MMOL/L (ref 99–110)
CO2: 25 MMOL/L (ref 21–32)
CREAT SERPL-MCNC: 0.8 MG/DL (ref 0.6–1.1)
DIFFERENTIAL TYPE: ABNORMAL
EOSINOPHILS ABSOLUTE: 0.7 K/CU MM
EOSINOPHILS RELATIVE PERCENT: 10.1 % (ref 0–3)
GFR AFRICAN AMERICAN: >60 ML/MIN/1.73M2
GFR NON-AFRICAN AMERICAN: >60 ML/MIN/1.73M2
GLUCOSE BLD-MCNC: 145 MG/DL (ref 70–99)
HCT VFR BLD CALC: 38.7 % (ref 37–47)
HEMOGLOBIN: 13 GM/DL (ref 12.5–16)
LYMPHOCYTES ABSOLUTE: 1.3 K/CU MM
LYMPHOCYTES RELATIVE PERCENT: 19 % (ref 24–44)
MCH RBC QN AUTO: 31.1 PG (ref 27–31)
MCHC RBC AUTO-ENTMCNC: 33.6 % (ref 32–36)
MCV RBC AUTO: 92.6 FL (ref 78–100)
MONOCYTES ABSOLUTE: 0.7 K/CU MM
MONOCYTES RELATIVE PERCENT: 9.6 % (ref 0–4)
PDW BLD-RTO: 14.7 % (ref 11.7–14.9)
PLATELET # BLD: 228 K/CU MM (ref 140–440)
PMV BLD AUTO: 9.4 FL (ref 7.5–11.1)
POTASSIUM SERPL-SCNC: 3.9 MMOL/L (ref 3.5–5.1)
RBC # BLD: 4.18 M/CU MM (ref 4.2–5.4)
SEGMENTED NEUTROPHILS ABSOLUTE COUNT: 4.2 K/CU MM
SEGMENTED NEUTROPHILS RELATIVE PERCENT: 60 % (ref 36–66)
SODIUM BLD-SCNC: 140 MMOL/L (ref 135–145)
TOTAL PROTEIN: 6.6 GM/DL (ref 6.4–8.2)
WBC # BLD: 7 K/CU MM (ref 4–10.5)

## 2021-08-11 PROCEDURE — 85025 COMPLETE CBC W/AUTO DIFF WBC: CPT

## 2021-08-11 PROCEDURE — 6360000002 HC RX W HCPCS: Performed by: INTERNAL MEDICINE

## 2021-08-11 PROCEDURE — 86300 IMMUNOASSAY TUMOR CA 15-3: CPT

## 2021-08-11 PROCEDURE — 99213 OFFICE O/P EST LOW 20 MIN: CPT | Performed by: INTERNAL MEDICINE

## 2021-08-11 PROCEDURE — 80053 COMPREHEN METABOLIC PANEL: CPT

## 2021-08-11 PROCEDURE — 36591 DRAW BLOOD OFF VENOUS DEVICE: CPT

## 2021-08-11 PROCEDURE — 2580000003 HC RX 258: Performed by: INTERNAL MEDICINE

## 2021-08-11 RX ORDER — SODIUM CHLORIDE 0.9 % (FLUSH) 0.9 %
10 SYRINGE (ML) INJECTION PRN
Status: CANCELLED | OUTPATIENT
Start: 2021-08-11

## 2021-08-11 RX ORDER — HEPARIN SODIUM (PORCINE) LOCK FLUSH IV SOLN 100 UNIT/ML 100 UNIT/ML
500 SOLUTION INTRAVENOUS PRN
Status: CANCELLED | OUTPATIENT
Start: 2021-08-11

## 2021-08-11 RX ORDER — HEPARIN SODIUM (PORCINE) LOCK FLUSH IV SOLN 100 UNIT/ML 100 UNIT/ML
500 SOLUTION INTRAVENOUS PRN
Status: DISCONTINUED | OUTPATIENT
Start: 2021-08-11 | End: 2021-08-12 | Stop reason: HOSPADM

## 2021-08-11 RX ORDER — SODIUM CHLORIDE 0.9 % (FLUSH) 0.9 %
20 SYRINGE (ML) INJECTION PRN
Status: DISCONTINUED | OUTPATIENT
Start: 2021-08-11 | End: 2021-08-12 | Stop reason: HOSPADM

## 2021-08-11 RX ORDER — SODIUM CHLORIDE 0.9 % (FLUSH) 0.9 %
20 SYRINGE (ML) INJECTION PRN
Status: CANCELLED | OUTPATIENT
Start: 2021-08-11

## 2021-08-11 RX ADMIN — HEPARIN 500 UNITS: 100 SYRINGE at 10:08

## 2021-08-11 RX ADMIN — SODIUM CHLORIDE, PRESERVATIVE FREE 20 ML: 5 INJECTION INTRAVENOUS at 10:08

## 2021-08-11 NOTE — PROGRESS NOTES
MA Rooming Questions  Patient: Sang Tang  MRN: [id number]    Date: 8/11/2021        1. Do you have any new issues? yes - doesn't take Gabapentin         2. Do you need any refills on medications?    no    3. Have you had any imaging done since your last visit?   no    4. Have you been hospitalized or seen in the emergency room since your last visit here?   no    5. Did the patient have a depression screening completed today?  No    No data recorded     PHQ-9 Given to (if applicable):               PHQ-9 Score (if applicable):                     [] Positive     []  Negative              Does question #9 need addressed (if applicable)                     [] Yes    []  No               Angeles Sensing, CMA

## 2021-08-11 NOTE — PROGRESS NOTES
Patient Name: Rhea Mcduffie  Patient : 1950  Patient MRN: [id number]     Primary Oncologist: Laure Guillen MD  Referring Provider: Danika Kaur MD     Date of Service: 2021      Chief Complaint:   Chief Complaint   Patient presents with    Follow-up     Patient Active Problem List:     Malignant neoplasm of female breast      Postoperative nausea     Neutropenic fever      Alopecia (capitis) totalis    HPI:   Ms. Abraham Merrill is a 50-year-old very pleasant female with medical history significant for hypertension, hyperlipidemia, coronary artery disease, initially referred to me on 2020 for evaluation of his clinical stage IIA left breast, high grade, triple negative, invasive ductal carcinoma. She stated that she has echocardiogram and stress test on 2020. It showed normal EF (EF 51%). Concentric left ventricular hypertrophy and she has mild to moderate mitral regurfitation. She was incidentally noted to have left breast mass. She was subsequently evaluated by Dr. Mt Henderson and she requested diagnostic mammogram. She underwent diagnostic digital bilateral mammogram on 2020 and it showed dumbbell-shaped mass at 2-3 o'clock at middle to posterior depth with associated pleomorphic calcifications, in her left breast. Targeted ultrasound at 2 o'clock at a distance of 7 cm from the nipple demonstrates a dumbbell-shaped hypoechoic mass with angular margins that measures 3.3 x 2.5 x 2.1 cm. Internal vascularity noted along with some posterior acoustic shadowing. No suspicious axillary lymph nodes. She underwent ultrasound guided core biopsy of left breast and clip marker placement on 2020 and final pathology showed invasive ductal carcinoma. Tumor size is at least 12 mm and it is a high grade tumor. ER by IHC is negative (0%), PgR by IHC is negative (0%), Her2 by IHC is equivocal (score 2+) and Her2 by FISH is negative.      Since she is found to have clinical stage IIA left breast invasive ductal carcinoma, she was subsequently referred to me for further evaluation. She was seen by Dr. Daphne Wang and she underwent left breast lumpectomy and sentinel lymph node biopsy on March 20, 2020. Final pathology showed stage IIA INVASIVE DUCTAL CARCINOMA WITH EXTENSIVE NECROSIS, GRADE 3, 2.8 CM. IN GREATEST DIMENSION. SURGICAL MARGINS ARE POSITIVE FOR MALIGNANCY. Ductal Carcinoma In Situ and lobular Carcinoma In Situ are not present. One sentinel lymph node examined was negative for metastasis. No lymphovascular or dermal lymphovascular invasion. Pathologic Stage Classification: pT2, N0, Mx. Repeat surgery for positive margins was done on March 30, 2020 and there was no residual cancer seen. Since she is found to have stage IIA left breast triple negative invasive ductal carcinoma, I recommend for adjuvant chemotherapy and radiation therapy. She had Echocardiogram on 2/2020 and her EF was 51%. Dose dense chemotherapy with doxorubicin/cyclophosphamide, followed by Paclitaxel was started on May 4, 2020 and she completed it on 10/19/2020. She received adjuvant radiation therapy between November 30, 2020 and December 29, 2020. DEXA scan done on 7/30/2020 showed osteopenia by WHO criteria. Mammogram done on 3/23/2021 showed no mammographic evidence of malignancy. Expected posttreatment changes of the left breast.  Normal follow-up is recommended in 12 months. On August 11, 2021, she presented to me for follow-up. I have been following her for stage IIA left breast triple negative invasive ductal carcinoma and she is status post left breast lumpectomy and sentinel lymph node biopsy, adjuvant chemotherapy and adjuvant radiation therapy. She has been in a close observation since then. Reviewed with her findings of mammogram done on March 23, 2021. Since there is no evidence of malignancy, I recommended to have repeat mammogram in March 2022.      She does not have any Resp 18   Ht 5' 4\" (1.626 m)   Wt 174 lb (78.9 kg)   LMP  (LMP Unknown)   SpO2 99%   BMI 29.87 kg/m²     Physical Exam:  CONSTITUTIONAL: awake, no apparent distress, alert, cooperative,    EYES: pupils equal, round and reactive to light, sclera clear and conjunctiva normal  ENT: Normocephalic, without obvious abnormality, atraumatic  NECK: supple, symmetrical, no jugular venous distension and no carotid bruits   HEMATOLOGIC/LYMPHATIC: no cervical, supraclavicular or axillary lymphadenopathy   LUNGS: VBS, no wheezes, no crackles, no leg swelling, no cyanosis, clear to auscultation, no leg swelling, no cyanosis,    CARDIOVASCULAR: regular rate and rhythm, normal S1 and S2, no murmur noted  ABDOMEN: soft, non-distended, normal bowel sounds x 4, non-tender, no masses palpated, no hepatosplenomegaly   MUSCULOSKELETAL: full range of motion noted, tone is normal  NEUROLOGIC: awake, alert, oriented to name, place and time. Motor skills grossly intact. SKIN: Normal skin color, texture, turgor and no jaundice.  appears intact   EXTREMITIES: no LE edema, no clubbing, no leg swelling, no cyanosis,   BREASTS: Post surgical and radiation changes noted in left breast, no palpable mass in bilateral breasts and axilla     Labs:  Hematology:  Lab Results   Component Value Date    WBC 7.0 08/11/2021    RBC 4.18 (L) 08/11/2021    HGB 13.0 08/11/2021    HCT 38.7 08/11/2021    MCV 92.6 08/11/2021    MCH 31.1 (H) 08/11/2021    MCHC 33.6 08/11/2021    RDW 14.7 08/11/2021     08/11/2021    MPV 9.4 08/11/2021    BANDSPCT 31 (H) 06/13/2020    SEGSPCT 60.0 08/11/2021    EOSRELPCT 10.1 (H) 08/11/2021    BASOPCT 1.3 (H) 08/11/2021    LYMPHOPCT 19.0 (L) 08/11/2021    MONOPCT 9.6 (H) 08/11/2021    BANDABS 10.20 06/13/2020    SEGSABS 4.2 08/11/2021    EOSABS 0.7 08/11/2021    BASOSABS 0.1 08/11/2021    LYMPHSABS 1.3 08/11/2021    MONOSABS 0.7 08/11/2021    DIFFTYPE AUTOMATED DIFFERENTIAL 08/11/2021    ANISOCYTOSIS 1+ 06/13/2020 POLYCHROM 1+ 06/13/2020    WBCMORP OCCASIONAL 06/10/2020    PLTM FEW 06/13/2020     No results found for: ESR  Chemistry:  Lab Results   Component Value Date     02/10/2021    K 4.1 02/10/2021     02/10/2021    CO2 26 02/10/2021    BUN 14 02/10/2021    CREATININE 0.9 02/10/2021    GLUCOSE 89 02/10/2021    CALCIUM 9.7 02/10/2021    PROT 6.6 02/10/2021    LABALBU 4.1 02/10/2021    BILITOT 0.8 02/10/2021    ALKPHOS 69 02/10/2021    AST 39 (H) 02/10/2021    ALT 47 (H) 02/10/2021    LABGLOM >60 02/10/2021    GFRAA >60 02/10/2021    AGRATIO 1.9 01/29/2020    GLOB 2.3 01/29/2020    MG 1.6 (L) 06/12/2020     No results found for: MMA, LDH, HOMOCYSTEINE  No components found for: LD  Lab Results   Component Value Date    T4FREE 1.0 02/07/2019     Immunology:  Lab Results   Component Value Date    PROT 6.6 02/10/2021     No results found for: Jacquelyn Brood, KLFLCR  No results found for: B2M  Coagulation Panel:  No results found for: PROTIME, INR, APTT, DDIMER  Anemia Panel:  No results found for: [id number], FOLATE  Tumor Markers:  Lab Results   Component Value Date    LABCA2 36.5 02/10/2021     Observations:  No data recorded       Assessment & Plan:   Clinical stage IIA left breast, high grade, triple negative, invasive ductal carcinoma    PLAN  Ms. Juarez Scott is a 40-year-old very pleasant female who was incidentally found to have left breast mass on Echocardiogram done on February 11, 2020. Further work ups with diagnostic digital mammogram and targeted sonogram of left breast revealed that she has about 3.3 cm mass in her left breast upper outer quadrant. She subsequently had ultrasound guided biopsy of the left breast mass on February 25, 2020 and final pathology revealed clinical stage IIA high grade, triple negative, left breast invasive ductal carcinoma. She was seen by Dr. Des Coronel and she underwent left breast lumpectomy and sentinel lymph node biopsy on March 20, 2020.  Final pathology showed stage IIA INVASIVE DUCTAL CARCINOMA WITH EXTENSIVE NECROSIS, GRADE 3, 2.8 CM. IN GREATEST DIMENSION. SURGICAL MARGINS ARE POSITIVE FOR MALIGNANCY. Ductal Carcinoma In Situ and lobular Carcinoma In Situ are not present. One sentinel lymph node examined was negative for metastasis. No lymphovascular or dermal lymphovascular invasion. Pathologic Stage Classification: pT2, N0, Mx. Repeat surgery for positive margins was done on March 30, 2020 and there was no residual cancer seen. Since she is found to have stage IIA left breast triple negative invasive ductal carcinoma, I recommend for adjuvant chemotherapy and radiation therapy. She had Echocardiogram on 2/2020 and her EF was 51%. Dose dense chemotherapy with doxorubicin/cyclophosphamide, followed by Paclitaxel was started on May 4, 2020 and she completed it on 10/19/2020. She received adjuvant radiation therapy between November 30, 2020 and December 29, 2020. DEXA scan done on 7/30/2020 showed osteopenia by WHO criteria. Mammogram done on 3/23/2021 showed no mammographic evidence of malignancy. Expected posttreatment changes of the left breast.  Normal follow-up is recommended in 12 months. On August 11, 2021, she presented to me for follow-up. I have been following her for stage IIA left breast triple negative invasive ductal carcinoma and she is status post left breast lumpectomy and sentinel lymph node biopsy, adjuvant chemotherapy and adjuvant radiation therapy. She has been in a close observation since then. Reviewed with her findings of mammogram done on March 23, 2021. Since there is no evidence of malignancy, I recommended to have repeat mammogram in March 2022. She does not have any signs or symptoms suggestive for recurrent or metastatic breast cancer on today visit. I will check CBC, CMP and CA 27-29 level today. She has significant neuropathy and it has not been controlled with Cymbalta 60 mg daily.   Since Cymbalta has not been controlled her neuropathy, I recommend her to stop taking it. I started gabapentin 300 mg every night and she stopped taking it since she is concerned about side effects. Her neuropathy has gotten better with massage therapy. I will continue with observation at this moment. Health maintnance - I recommend her to have age-appropriate cancer screening, exercise, low-fat and low-sodium diet. I answered all her questions and concerns for today. I recommend her to follow-up with primary care physician, Dr. Sophia Rawls on regular basis. Recent imaging and labs were reviewed and discussed with the patient.

## 2021-08-11 NOTE — PROGRESS NOTES
Ambulated to infusion area. Right mediport accessed, [facility] blood return, labs drawn. De- accessed and flushed per protocol. No complaints voiced. Discharged in stable condition.

## 2021-08-13 LAB — CA 27.29: 22 U/ML (ref 0–40)

## 2021-08-31 ENCOUNTER — TELEPHONE (OUTPATIENT)
Dept: SURGERY | Age: 71
End: 2021-08-31

## 2021-08-31 ENCOUNTER — OFFICE VISIT (OUTPATIENT)
Dept: SURGERY | Age: 71
End: 2021-08-31
Payer: MEDICARE

## 2021-08-31 VITALS
TEMPERATURE: 96.8 F | DIASTOLIC BLOOD PRESSURE: 64 MMHG | OXYGEN SATURATION: 98 % | SYSTOLIC BLOOD PRESSURE: 145 MMHG | HEART RATE: 60 BPM

## 2021-08-31 DIAGNOSIS — C50.912 INFILTRATING DUCTAL CARCINOMA OF LEFT BREAST (HCC): Primary | ICD-10-CM

## 2021-08-31 DIAGNOSIS — N64.4 BREAST PAIN, LEFT: Primary | ICD-10-CM

## 2021-08-31 DIAGNOSIS — N63.20 BREAST MASS, LEFT: ICD-10-CM

## 2021-08-31 PROCEDURE — 99213 OFFICE O/P EST LOW 20 MIN: CPT | Performed by: SURGERY

## 2021-08-31 NOTE — PROGRESS NOTES
Keyla Moser is here for cancer of the left  Breast.  Initial size 2.8 cm. Nodes resected 1. Numbers positive: 0. Stage: T2 N0 M0. (Stage: IIA)   Procedure: lumpectomy w sentinel node biopsy Date: 3/20/2020   Hormonal Therapy:no  Chemotherapy: done  Radiation Therapy: done  [facility]  ONCOTYPE  ER-/AZ-/HER2-  Sozo -2.6, -3.4 today    Current Evaluation:   Today the patient is basically feeling well. She denies new nipple discharge, or breast pain. No new subcutaneous mass, headache, bone pain, new cough, or abdominal pain. She has felt a mass in her incision 3 days ago. Physical Exam:   Generally healthy appearing,  female   Skin: no new subcutaneous mass   Breast: non-tender, no new mass on right, area on left feels like infra mammary ridge but it is tender  Lungs clear   Heart RRR  Lymphadenopathy: no new axillary and supraclavicular   Abdomen: non-tender without liver, spleen, kidney, or mass palpable     Mammography:   Last mammogram: 3/23/2021   Mammogram is unchanged from previous mammograms. Radiologist report is negative for malignancy     Impression:   Firm area under left breast    Plan: RTC 2 weeks  Will do a diagnostic left mammo to be sure this area is OK.

## 2021-09-09 ENCOUNTER — HOSPITAL ENCOUNTER (OUTPATIENT)
Dept: ULTRASOUND IMAGING | Age: 71
Discharge: HOME | End: 2021-09-09
Payer: MEDICARE

## 2021-09-09 ENCOUNTER — HOSPITAL ENCOUNTER (OUTPATIENT)
Dept: WOMENS IMAGING | Age: 71
Discharge: HOME | End: 2021-09-09
Payer: MEDICARE

## 2021-09-09 DIAGNOSIS — N63.20 BREAST MASS, LEFT: ICD-10-CM

## 2021-09-09 DIAGNOSIS — N64.4 BREAST PAIN, LEFT: ICD-10-CM

## 2021-09-09 PROCEDURE — 76642 ULTRASOUND BREAST LIMITED: CPT

## 2021-09-09 PROCEDURE — G0279 TOMOSYNTHESIS, MAMMO: HCPCS

## 2021-09-14 ENCOUNTER — HOSPITAL ENCOUNTER (OUTPATIENT)
Dept: WOMENS IMAGING | Age: 71
Discharge: HOME | End: 2021-09-14
Payer: MEDICARE

## 2021-09-14 ENCOUNTER — HOSPITAL ENCOUNTER (OUTPATIENT)
Dept: ULTRASOUND IMAGING | Age: 71
Discharge: HOME | End: 2021-09-14
Payer: MEDICARE

## 2021-09-14 DIAGNOSIS — N63.20 LEFT BREAST MASS: ICD-10-CM

## 2021-09-14 PROCEDURE — 2720000010 US BREAST BIOPSY W LOC DEVICE 1ST LESION LEFT

## 2021-09-14 PROCEDURE — 77065 DX MAMMO INCL CAD UNI: CPT

## 2021-09-14 PROCEDURE — 88305 TISSUE EXAM BY PATHOLOGIST: CPT

## 2021-09-27 DIAGNOSIS — I10 ESSENTIAL HYPERTENSION: ICD-10-CM

## 2021-09-27 RX ORDER — ATENOLOL 100 MG/1
TABLET ORAL
Qty: 90 TABLET | Refills: 3 | OUTPATIENT
Start: 2021-09-27

## 2021-09-28 ENCOUNTER — OFFICE VISIT (OUTPATIENT)
Dept: SURGERY | Age: 71
End: 2021-09-28
Payer: MEDICARE

## 2021-09-28 VITALS
HEART RATE: 62 BPM | DIASTOLIC BLOOD PRESSURE: 72 MMHG | BODY MASS INDEX: 29.87 KG/M2 | SYSTOLIC BLOOD PRESSURE: 147 MMHG | RESPIRATION RATE: 14 BRPM | TEMPERATURE: 96.8 F | WEIGHT: 174 LBS

## 2021-09-28 DIAGNOSIS — C50.912 INFILTRATING DUCTAL CARCINOMA OF LEFT BREAST (HCC): Primary | ICD-10-CM

## 2021-09-28 PROCEDURE — 99213 OFFICE O/P EST LOW 20 MIN: CPT | Performed by: SURGERY

## 2021-09-28 NOTE — PROGRESS NOTES
Brennon Carrion is here for cancer of the left  Breast.  Initial size 2.8 cm. Nodes resected 1. Numbers positive: 0. Stage: T2 N0 M0. (Stage: IIA)   Procedure: lumpectomy w sentinel node biopsy Date: 3/20/2020   Hormonal Therapy:no  Chemotherapy: done  Radiation Therapy: done  [facility]  ONCOTYPE  ER-/SC-/HER2-  Sozo -2.6, -3.4, -3.3 today    Current Evaluation:   Today the patient is basically feeling well. She denies new breast mass, nipple discharge, or breast pain. No new subcutaneous mass, headache, bone pain, new cough, or abdominal pain. Physical Exam:   Generally healthy appearing,  female   Skin: no new subcutaneous mass   Breast: non-tender, no new mass   Lungs clear   Heart RRR  Lymphadenopathy: no new axillary and supraclavicular   Abdomen: non-tender without liver, spleen, kidney, or mass palpable     Mammography:   Last mammogram: 9/21/2021   Personally reviewed by me. Mammogram is changed from previous mammograms. Radiologist report is suspicious lesion in prior lumpectomy site.  Biopsy shows hyaline fibrosis and no evidence of malignancy     Impression:   No evidence of systemic or recurrent breast cancer:   No evidence of second primary breast cancer     Plan: RTC 3 mo  Will need a mammogram in Sept 2022

## 2021-10-17 DIAGNOSIS — E78.5 HYPERLIPIDEMIA, UNSPECIFIED HYPERLIPIDEMIA TYPE: ICD-10-CM

## 2021-10-19 RX ORDER — ATORVASTATIN CALCIUM 10 MG/1
TABLET, FILM COATED ORAL
Qty: 90 TABLET | Refills: 0 | OUTPATIENT
Start: 2021-10-19

## 2021-11-01 DIAGNOSIS — E78.5 HYPERLIPIDEMIA, UNSPECIFIED HYPERLIPIDEMIA TYPE: ICD-10-CM

## 2021-11-01 RX ORDER — ATORVASTATIN CALCIUM 10 MG/1
TABLET, FILM COATED ORAL
Qty: 90 TABLET | Refills: 0 | OUTPATIENT
Start: 2021-11-01

## 2021-11-01 RX ORDER — ATORVASTATIN CALCIUM 10 MG/1
10 TABLET, FILM COATED ORAL DAILY
Qty: 90 TABLET | Refills: 3 | OUTPATIENT
Start: 2021-11-01

## 2021-11-18 PROBLEM — D70.9 NEUTROPENIC FEVER (HCC): Status: RESOLVED | Noted: 2020-06-10 | Resolved: 2021-11-18

## 2021-11-18 PROBLEM — R50.81 NEUTROPENIC FEVER (HCC): Status: RESOLVED | Noted: 2020-06-10 | Resolved: 2021-11-18

## 2021-12-28 ENCOUNTER — OFFICE VISIT (OUTPATIENT)
Dept: SURGERY | Age: 71
End: 2021-12-28
Payer: MEDICARE

## 2021-12-28 VITALS
WEIGHT: 177.2 LBS | DIASTOLIC BLOOD PRESSURE: 72 MMHG | HEART RATE: 53 BPM | RESPIRATION RATE: 14 BRPM | SYSTOLIC BLOOD PRESSURE: 149 MMHG | BODY MASS INDEX: 30.42 KG/M2 | TEMPERATURE: 97 F

## 2021-12-28 DIAGNOSIS — C50.912 INFILTRATING DUCTAL CARCINOMA OF LEFT BREAST (HCC): Primary | ICD-10-CM

## 2021-12-28 PROCEDURE — 99213 OFFICE O/P EST LOW 20 MIN: CPT | Performed by: SURGERY

## 2021-12-28 NOTE — PROGRESS NOTES
Mariano Dhillon is here for cancer of the left  Breast.  Initial size 2.8 cm. Nodes resected 1. Numbers positive: 0. Stage: T2 N0 M0. (Stage: IIA)   Procedure: lumpectomy w sentinel node biopsy Date: 3/20/2020   Hormonal Therapy:no  Chemotherapy: done  Radiation Therapy: done  [facility]  ONCOTYPE  ER-/TX-/HER2-  Sozo -2.6, -3.4, -3.3 today    Current Evaluation:   Today the patient is basically feeling well. She denies new breast mass, nipple discharge, or breast pain. No new subcutaneous mass, headache, bone pain, new cough, or abdominal pain. Physical Exam:   Generally healthy appearing,  female   Skin: no new subcutaneous mass   Breast: non-tender, no new mass   Lungs clear   Heart RRR  Lymphadenopathy: no new axillary and supraclavicular   Abdomen: non-tender without liver, spleen, kidney, or mass palpable     Mammography:   Last mammogram: 9/2021   Personally reviewed by me. Mammogram is unchanged from previous mammograms.  Radiologist report is suspicious mass but biopsy was negative     Impression:   No evidence of systemic or recurrent breast cancer:   No evidence of second primary breast cancer     Plan: RTC 3 mo  Will need a mammogram in Sept 2022 with a bone density

## 2022-01-20 ENCOUNTER — OFFICE VISIT (OUTPATIENT)
Dept: FAMILY MEDICINE CLINIC | Age: 72
End: 2022-01-20
Payer: MEDICARE

## 2022-01-20 VITALS
DIASTOLIC BLOOD PRESSURE: 78 MMHG | SYSTOLIC BLOOD PRESSURE: 128 MMHG | TEMPERATURE: 97 F | HEIGHT: 64 IN | BODY MASS INDEX: 30.39 KG/M2 | WEIGHT: 178 LBS | HEART RATE: 60 BPM

## 2022-01-20 DIAGNOSIS — G62.0 CHEMOTHERAPY-INDUCED NEUROPATHY (HCC): ICD-10-CM

## 2022-01-20 DIAGNOSIS — I10 ESSENTIAL HYPERTENSION: Primary | ICD-10-CM

## 2022-01-20 DIAGNOSIS — T45.1X5A CHEMOTHERAPY-INDUCED NEUROPATHY (HCC): ICD-10-CM

## 2022-01-20 PROCEDURE — 99213 OFFICE O/P EST LOW 20 MIN: CPT | Performed by: FAMILY MEDICINE

## 2022-01-20 ASSESSMENT — ENCOUNTER SYMPTOMS
SHORTNESS OF BREATH: 0
CHEST TIGHTNESS: 0

## 2022-01-20 NOTE — PATIENT INSTRUCTIONS
07-Mar-2019 PLEASE BRING YOUR MEDICATIONS TO ALL APPOINTMENTS    The diagnoses and medications listed in this after visit summary may not be accurate at the time of check out. Please check MY CHART in 28-48 hours for possible corrections. Late cancellation policy: So that we can better accommodate people who are sick, please give our office 24 hour notice for an appointment cancellation. Missed appointments: Your care is very important to us. It is important that you keep your scheduled appointments. Multiple missed appointments will lead to a dismissal from the office. Patients arriving late will be worked into the schedule as time permits, with patients arriving on time taken as scheduled. Late arriving patients are more than welcome to wait or reschedule their appointments. Please allow 5-7 business days for paperwork to be processed. It is important that you check your MY Chart messages, as they include appointment reminders, test results, and other important information. If you have forgotten your password, please call [phone]. HERE ARE SOME LIFE CHANGING TIPS      1. Take your blood pressure medications at bedtime to reduce your chance of heart attack or stroke  2. Fever in kids:  Give both Tylenol and Ibuprofen at the same time rather than staggering them   3. Follow these tips to reduce childhood obesity: Reduce unnecessary exposure to antibiotics, consume whole milk instead of skim milk, watch public TV instead of regular TV (less exposure to junk food commercials), and reduce traumatic experiences. 4. 1 egg per day is good for your heart  5. Alternate day fasting does promote weight loss. Skipping breakfast increases your risk of obesity  6. Artificially sweetened drinks increase all cause mortality (strokes, body mass index, cardiovascular disease)  7. Kale consumption can reduce onset of dementia  8.  Walking at least 8000 steps per day and resistance exercise 2-3 x per week are good for your heart  9. Brushing teeth 3 times per day can decrease chance of getting diabetes  10. Antibiotic use is associated with a lifetime increased risk of breast cancer and heart disease.

## 2022-01-20 NOTE — PROGRESS NOTES
Patient ID: Osvaldo Kramer 1950    . Chief Complaint   Patient presents with    Hypertension    Hyperlipidemia     Chemotherapy-induced neuropathy: Did not care for the gabapentin. It did not seem to help. She has stopped taking it. She told her oncologist she would try it again if the neuropathy worsened. She has loss of feeling in her feet and it feels like she has socks that are bunched up in her shoes when she walks. Hypertension  This is a chronic problem. The current episode started more than 1 year ago. The problem is unchanged. The problem is controlled. Pertinent negatives include no palpitations or shortness of breath. Risk factors for coronary artery disease include post-menopausal state and obesity. Past treatments include angiotensin blockers. There are no compliance problems. Hyperlipidemia  This is a chronic problem. The current episode started more than 1 year ago. The problem is controlled. Pertinent negatives include no shortness of breath. Current antihyperlipidemic treatment includes statins. Risk factors for coronary artery disease include post-menopausal, hypertension and obesity. Coronary Artery Disease  Presents for follow-up visit. Pertinent negatives include no chest tightness, leg swelling, palpitations or shortness of breath. Risk factors include hyperlipidemia. Review of Systems   Respiratory: Negative for chest tightness and shortness of breath. Cardiovascular: Negative for palpitations and leg swelling. Patient Active Problem List   Diagnosis    Essential hypertension    Hyperlipidemia    History of colon polyps    Obesity (BMI 30.0-34. 9)    Osteopenia of multiple sites    Coronary artery disease involving native coronary artery of native heart without angina pectoris    Hepatic steatosis    Malignant neoplasm of upper-outer quadrant of left breast in female, estrogen receptor negative (HCC)    Postoperative nausea    Alopecia (capitis) totalis  Diarrhea due to drug    Chemotherapy-induced neuropathy [facility])       Past Surgical History:   Procedure Laterality Date    BREAST BIOPSY Left 2020    BREAST BIOPSY Left 2020    sentinal node, partial mastectomy left    BREAST LUMPECTOMY Left 3/20/2020    LEFT BREAST LUMPECTOMY WITH NEEDLE LOC AND SENTINEL NODE BIOPSY performed by Wendy De La Rosa MD at [address] Left 3/30/2020    BREAST LESION RE EXCISION LEFT LUMPECTOMY SITE performed by Wendy De La Rosa MD at [address] [facility] TEST  2009    treadmill, Dr. Benjamin Zamudio Right 's    right     SECTION      COLONOSCOPY          COLONOSCOPY  2015    diverticulosis, internal hemorroids. repeat 5 years. Garrie Glass PORT SURGERY Right 2020    PORT SURGERY Right 3/20/2020    PORT INSERTION performed by Wendy De La Rosa MD at [facility] Right 2016    TUBAL LIGATION      US BREAST NEEDLE BIOPSY LEFT Left 2021    US BREAST NEEDLE BIOPSY LEFT 2021 [facility] [employer] [facility]       Family History   Problem Relation Age of Onset    Stroke Father     Coronary Art Dis Father     High Blood Pressure Father     Heart Attack Brother 72    High Blood Pressure Mother     No Known Problems Sister        Current Outpatient Medications on File Prior to Visit   Medication Sig Dispense Refill    Calcium Carb-Cholecalciferol (CALTRATE 600+D) 600-800 MG-UNIT TABS 1 tab twice per day 180 tablet 3    atenolol (TENORMIN) 100 MG tablet Take 1 tablet by mouth daily 90 tablet 3    atorvastatin (LIPITOR) 10 MG tablet Take 1 tablet by mouth daily 90 tablet 3    vitamin B-12 (CYANOCOBALAMIN) 1000 MCG tablet Take 1,000 mcg by mouth daily      ketoconazole (NIZORAL) 2 % shampoo       losartan (COZAAR) 50 MG tablet Take 1 tablet by mouth daily      aspirin 81 MG tablet Take 81 mg by mouth daily.       Multiple Vitamin (MULTIVITAMIN PO) Take  by mouth daily.  gabapentin (NEURONTIN) 300 MG capsule Take 1 capsule by mouth nightly for 180 days. Intended supply: 90 days (Patient not taking: Reported on 7/21/2021) 90 capsule 1    DULoxetine (CYMBALTA) 60 MG extended release capsule Take 1 capsule by mouth daily (Patient not taking: Reported on 1/20/2022) 30 capsule 3    silver sulfADIAZINE (SILVADENE) 1 % cream Apply topically twice daily. (Patient not taking: Reported on 1/20/2022) 50 g 0    triamcinolone (KENALOG) 0.1 % cream  (Patient not taking: Reported on 1/20/2022)      nitroGLYCERIN (NITROSTAT) 0.3 MG SL tablet Place 1 tablet under the tongue every 5 minutes as needed for Chest pain (Patient not taking: Reported on 1/20/2022) 25 tablet 0    clobetasol (OLUX) 0.05 % foam  (Patient not taking: Reported on 1/20/2022)       No current facility-administered medications on file prior to visit. Objective:         Physical Exam  Vitals and nursing note reviewed. Constitutional:       Appearance: She is well-developed. HENT:      Head: Normocephalic and atraumatic. Cardiovascular:      Rate and Rhythm: Normal rate and regular rhythm. Heart sounds: Normal heart sounds, S1 normal and S2 normal.   Pulmonary:      Effort: Pulmonary effort is normal. No respiratory distress. Breath sounds: Normal breath sounds. No wheezing. Musculoskeletal:      Cervical back: Neck supple. Skin:     General: Skin is warm and dry. Neurological:      Mental Status: She is alert. Vitals:    01/20/22 0809   BP: 128/78   Site: Right Upper Arm   Position: Sitting   Cuff Size: Medium Adult   Pulse: 60   Temp: 97 °F (36.1 °C)   Weight: 178 lb (80.7 kg)   Height: 5' 4\" (1.626 m)     Body mass index is 30.55 kg/m².      Wt Readings from Last 3 Encounters:   01/20/22 178 lb (80.7 kg)   12/28/21 177 lb 3.2 oz (80.4 kg)   09/28/21 174 lb (78.9 kg)     BP Readings from Last 3 Encounters:   01/20/22 128/78   12/28/21 (!) 149/72   09/28/21 (!) 147/72          No results found for this visit on 01/20/22. The 10-year ASCVD risk score (Emanuel Wilder, et al., 2013) is: 14.5%    Values used to calculate the score:      Age: 66 years      Sex: Female      Is Non- : No      Diabetic: No      Tobacco smoker: No      Systolic Blood Pressure: 472 mmHg      Is BP treated: Yes      HDL Cholesterol: 72 mg/dL      Total Cholesterol: 166 mg/dL  Lab Review   No visits with results within 2 Month(s) from this visit. Latest known visit with results is:   Hospital Outpatient Visit on 08/11/2021   Component Date Value    CA 27.29 08/11/2021 22.0     Sodium 08/11/2021 140     Potassium 08/11/2021 3.9     Chloride 08/11/2021 102     CO2 08/11/2021 25     BUN 08/11/2021 15     CREATININE 08/11/2021 0.8     Glucose 08/11/2021 145*    Calcium 08/11/2021 9.6     Albumin 08/11/2021 4.7     Total Protein 08/11/2021 6.6     Total Bilirubin 08/11/2021 1.1*    ALT 08/11/2021 30     AST 08/11/2021 29     Alkaline Phosphatase 08/11/2021 71     GFR Non- 08/11/2021 >60     GFR  08/11/2021 >60     Anion Gap 08/11/2021 13     WBC 08/11/2021 7.0     RBC 08/11/2021 4.18*    Hemoglobin 08/11/2021 13.0     Hematocrit 08/11/2021 38.7     MCV 08/11/2021 92.6     MCH 08/11/2021 31.1*    MCHC 08/11/2021 33.6     RDW 08/11/2021 14.7     Platelets 44/72/7895 228     MPV 08/11/2021 9.4     Differential Type 08/11/2021 AUTOMATED DIFFERENTIAL     Segs Relative 08/11/2021 60.0     Lymphocytes % 08/11/2021 19.0*    Monocytes % 08/11/2021 9.6*    Eosinophils % 08/11/2021 10.1*    Basophils % 08/11/2021 1.3*    Segs Absolute 08/11/2021 4.2     Lymphocytes Absolute 08/11/2021 1.3     Monocytes Absolute 08/11/2021 0.7     Eosinophils Absolute 08/11/2021 0.7     Basophils Absolute 08/11/2021 0.1            Assessment:       Diagnosis Orders   1.  Essential hypertension

## 2022-02-08 ENCOUNTER — HOSPITAL ENCOUNTER (OUTPATIENT)
Dept: RADIATION ONCOLOGY | Age: 72
Discharge: HOME | End: 2022-02-08
Attending: RADIOLOGY
Payer: MEDICARE

## 2022-02-08 VITALS
HEIGHT: 64 IN | TEMPERATURE: 96.3 F | BODY MASS INDEX: 31.34 KG/M2 | DIASTOLIC BLOOD PRESSURE: 65 MMHG | SYSTOLIC BLOOD PRESSURE: 141 MMHG | OXYGEN SATURATION: 98 % | WEIGHT: 183.6 LBS | HEART RATE: 60 BPM | RESPIRATION RATE: 16 BRPM

## 2022-02-08 PROCEDURE — 99213 OFFICE O/P EST LOW 20 MIN: CPT | Performed by: RADIOLOGY

## 2022-02-08 ASSESSMENT — PAIN SCALES - GENERAL: PAINLEVEL_OUTOF10: 0

## 2022-02-08 NOTE — PROGRESS NOTES
[id number] [facility]  [address] [facility], [address] [facility]  Phone: [phone]  Fax: [phone]    RADIATION ONCOLOGY FOLLOW UP REPORT    PATIENT NAME:  Cary Jacob              : 1950  MEDICAL RECORD NO: [id number]    [facility] NO: [id number]        PROVIDER: Vilma Davidson MD      DATE OF SERVICE: 2022     FOLLOW UP PHYSICIANS:  Primary MD Berta Castillo M.D.   [facility], [address] [facility],[address]     Tavia Eaton M.D.  [facility] 2nd-floor [address] [facility], [address] [place] [address]      DIAGNOSIS: Cancer Staging  Malignant neoplasm of upper-outer quadrant of left breast in female, estrogen receptor negative ([facility] [address]  Staging form: Breast, AJCC 8th Edition  - Pathologic stage from 3/9/2020: Stage IIA (pT2, pN0, cM0, G3, ER-, HI-, HER2-) - Signed by Berta Rodriguez MD on 2020         TREATMENT COURSE:   Oncology History   Malignant neoplasm of upper-outer quadrant of left breast in female, estrogen receptor negative ([facility] [address]    Initial Diagnosis    Malignant neoplasm of upper-outer quadrant of left breast in female, estrogen receptor negative ([facility] [address]     3/20/2020 Surgery    Left breast lumpectomy with sentinel node sampling. Subsequent reexcision with negative pathology     2020 -  Chemotherapy    Dose dense doxorubicin/cyclophosphamide followed by weekly paclitaxel     2020 - 2020 Radiation    Left Breast: 4240cGy  Lumpectomy bed boost: 10Gy; Total dose= 5240 cGy         HPI:   Cary Jacob is a 66 y.o. female who has a history as above with a triple negative stage IIa left breast cancer, who returns today for a routine follow-up visit. The patient was last seen by me in 2021 and was doing well at that time without evidence of recurrent or metastatic disease.   Her most recent mammogram was obtained bilaterally on 2021 showing a suspicious area in the left breast at 3:00 the biopsy of which from 9/14/2021 showed Hylan fibrosis without evidence of malignancy. Her most recent CA 27-29 from 8/11/2021 was 22 units/mL. Currently, the patient reports she's been doing well. Her energy level has been fairly good overall. She denies any fevers, chills, or drenching night sweats. Her weight and appetite have been stable. She denies any chest pain or shortness of breath. She has not noticed any new lumps or bumps anywhere throughout her body. She denies the new onset of bony pain. She is performing self breast examinations and has not noticed any new areas that concern her. She reports continued fibrosis at the lumpectomy cavity, recently biopsied without acute changes. She denies any nipple discharge, new skin redness, thickening, dimpling, or arm swelling. RADIOLOGIC STUDIES:  EXAMINATION:  DIAGNOSTIC DIGITAL BILATERAL BREASTS MAMMOGRAM WITH TOMOSYNTHESIS; TARGETED  ULTRASOUND OF THE LEFT BREAST, 9/9/2021 8:14 am    TECHNIQUE:  Diagnostic mammography of the bilateral breasts was performed with  tomosynthesis. 2D standard and 3D tomosynthesis combination imaging  performed through both breasts. Computer aided detection was utilized in the  interpretation of this exam.; Target ultrasound of the left breast was  performed. Views: CC and MLO views of bilateral breasts as well as standard true lateral  view of the left breast were obtained. Spot compression CC and MLO view of  the left breast were also obtained. COMPARISON:  March 23, 2021, March 20, 2020 and February 25, 2020    HISTORY:  ORDERING SYSTEM PROVIDED HISTORY: Breast pain, left    FINDINGS:  Scattered fibroglandular tissue is noted throughout the breasts bilaterally. There has been interval increase in focal asymmetry in the central left  breast at the lumpectomy site since prior examination. No other dominant  masses, areas of architectural distortion, or suspicious microcalcifications  are noted.   Targeted left breast ultrasound follows. Targeted left breast ultrasound was performed. There is a spiculated  hypoechoic lesion at the 3 o'clock position, 10 cm from the nipple in the  area of lumpectomy measuring 1.6 x 1.9 x 2.5 cm. There is no internal  vascularity. Posterior acoustic shadowing is noted. There is no left  axillary lymphadenopathy. Impression  1. Interval increase in focal asymmetry in the central left breast at the  lumpectomy site correlating with a 1.6 x 1.9 x 2.5 cm spiculated, hypoechoic  lesion seen on ultrasound. Though findings may represent post treatment  changes and fat necrosis, further evaluation with ultrasound-guided biopsy is  recommended given significant change in mammographic appearance since prior  examination of March 23, 2021. BIRADS:  BIRADS - CATEGORY 4B    Intermediate suspicion for malignancy. Suspicious Abnormality. Biopsy should be considered at this time. OVERALL ASSESSMENT - SUSPICIOUS    A letter of notification will be sent to the patient regarding the results. My findings and recommendations were discussed with the patient at the time  of service. A representative from the radiology department will be contacting your office  and assisting the patient in getting appropriate follow-up. No results found for this or any previous visit.           LABORATORY STUDIES:   CBC:   Lab Results   Component Value Date    WBC 7.0 08/11/2021    RBC 4.18 08/11/2021    HGB 13.0 08/11/2021    HCT 38.7 08/11/2021    MCV 92.6 08/11/2021    MCH 31.1 08/11/2021    MCHC 33.6 08/11/2021    RDW 14.7 08/11/2021     08/11/2021    MPV 9.4 08/11/2021     CMP:  Lab Results   Component Value Date     08/11/2021    K 3.9 08/11/2021     08/11/2021    CO2 25 08/11/2021    BUN 15 08/11/2021    CREATININE 0.8 08/11/2021    GFRAA >60 08/11/2021    GFRAA >60 05/22/2012    AGRATIO 1.9 01/29/2020    LABGLOM >60 08/11/2021    GLUCOSE 145 08/11/2021    PROT 6.6 08/11/2021    PROT 7.0 05/22/2012    LABALBU 4.7 08/11/2021    CALCIUM 9.6 08/11/2021    BILITOT 1.1 08/11/2021    ALKPHOS 71 08/11/2021    AST 29 08/11/2021    ALT 30 08/11/2021     Onc labs: No results found for: PSA, CEA, LDH, AFP    MEDICATIONS:   Current Outpatient Medications   Medication Sig Dispense Refill    Omega-3 Fatty Acids (FISH OIL PO) Take 1 tablet by mouth daily      Probiotic Product (ALIGN PO) Take 1 tablet by mouth daily      Calcium Carb-Cholecalciferol (CALTRATE 600+D) 600-800 MG-UNIT TABS 1 tab twice per day 180 tablet 3    atenolol (TENORMIN) 100 MG tablet Take 1 tablet by mouth daily 90 tablet 3    atorvastatin (LIPITOR) 10 MG tablet Take 1 tablet by mouth daily 90 tablet 3    vitamin B-12 (CYANOCOBALAMIN) 1000 MCG tablet Take 1,000 mcg by mouth daily      silver sulfADIAZINE (SILVADENE) 1 % cream Apply topically twice daily. 50 g 0    triamcinolone (KENALOG) 0.1 % cream       nitroGLYCERIN (NITROSTAT) 0.3 MG SL tablet Place 1 tablet under the tongue every 5 minutes as needed for Chest pain 25 tablet 0    clobetasol (OLUX) 0.05 % foam       ketoconazole (NIZORAL) 2 % shampoo       losartan (COZAAR) 50 MG tablet Take 1 tablet by mouth daily      aspirin 81 MG tablet Take 81 mg by mouth daily.  Multiple Vitamin (MULTIVITAMIN PO) Take  by mouth daily. No current facility-administered medications for this encounter. EXAMINATION:   Vitals:    02/08/22 0931   BP: (!) 141/65   Pulse: 60   Resp: 16   Temp: 96.3 °F (35.7 °C)   SpO2: 98%     The patient is in no acute distress. Neck: Supple no preauricular postauricular, submental, submandibular, cervical, supraclavicular, or infraclavicular lymphadenopathy is present. Heart: Regular rate and rhythm. No murmurs, clicks, or gallops are present. Lungs: Bilaterally clear to auscultation. No rales, rhonchi, or wheezing are present. Breast examination: Left-no palpable masses. No nipple discharge is elicited. Moderate tissue deficit with significant fibrosis at the lumpectomy cavity. Right-no palpable masses. No nipple discharge is elicited. No axillary lymphadenopathy is palpable  Abdomen: Soft, nontender and nondistended. No hepatosplenomegaly or masses are appreciated. Extremities: No cyanosis, clubbing, or edema is present. ASSESSMENT AND PLAN:     Aura Blood is a 66 y.o. female who has a history of a triple negative stage IIa invasive left-sided breast cancer who is now approximately 1.5 years after completion of her adjuvant left breast RT who is doing well at this time without evidence of recurrent or metastatic disease. She is to continue to see medical oncology every 6 months, to undergo bilateral mammography in September, and to return to see me in 1 year or sooner as needed. The patient is to continue to follow CDC's Covid 19 precautionary measures.       Electronically signed by Marilu Cuellar MD on 2/8/2022 at 9:57 AM

## 2022-02-08 NOTE — PROGRESS NOTES
Antonietta Evans  2/8/2022    Patient is seen today for follow up. Vitals:    02/08/22 0931   BP: (!) 141/65   Pulse: 60   Resp: 16   Temp: 96.3 °F (35.7 °C)   SpO2: 98%        Oxygen Therapy  SpO2: 98 %  Pulse Oximeter Device Mode: Intermittent  Pulse Oximeter Device Location: Left,Finger  O2 Device: None (Room air)    Wt Readings from Last 3 Encounters:   02/08/22 183 lb 9.6 oz (83.3 kg)   01/20/22 178 lb (80.7 kg)   12/28/21 177 lb 3.2 oz (80.4 kg)       Pain Assessment  Pain Assessment: 0-10  Pain Level: 0  Patient's Stated Pain Goal: No pain  BMX/Magic Mouthwash and Denies Need for Intervention       Allergies   Allergen Reactions    Codeine Hives and Swelling     \"tylenol with codeine is what I had trouble with\"         Current Outpatient Medications   Medication Sig Dispense Refill    Omega-3 Fatty Acids (FISH OIL PO) Take 1 tablet by mouth daily      Probiotic Product (ALIGN PO) Take 1 tablet by mouth daily      Calcium Carb-Cholecalciferol (CALTRATE 600+D) 600-800 MG-UNIT TABS 1 tab twice per day 180 tablet 3    atenolol (TENORMIN) 100 MG tablet Take 1 tablet by mouth daily 90 tablet 3    atorvastatin (LIPITOR) 10 MG tablet Take 1 tablet by mouth daily 90 tablet 3    vitamin B-12 (CYANOCOBALAMIN) 1000 MCG tablet Take 1,000 mcg by mouth daily      silver sulfADIAZINE (SILVADENE) 1 % cream Apply topically twice daily. 50 g 0    triamcinolone (KENALOG) 0.1 % cream       nitroGLYCERIN (NITROSTAT) 0.3 MG SL tablet Place 1 tablet under the tongue every 5 minutes as needed for Chest pain 25 tablet 0    clobetasol (OLUX) 0.05 % foam       ketoconazole (NIZORAL) 2 % shampoo       losartan (COZAAR) 50 MG tablet Take 1 tablet by mouth daily      aspirin 81 MG tablet Take 81 mg by mouth daily.  Multiple Vitamin (MULTIVITAMIN PO) Take  by mouth daily. No current facility-administered medications for this encounter. Additional Comments: Pt here for f/u appt and states no new concerns. Had Mammogram, U/S and biopsy in Sept 2021.     Electronically signed by Rebecca Singh CMA on 2/8/2022 at 9:35 AM

## 2022-02-11 ENCOUNTER — OFFICE VISIT (OUTPATIENT)
Dept: ONCOLOGY | Age: 72
End: 2022-02-11
Payer: MEDICARE

## 2022-02-11 ENCOUNTER — HOSPITAL ENCOUNTER (OUTPATIENT)
Dept: INFUSION THERAPY | Age: 72
Discharge: HOME | End: 2022-02-11
Payer: MEDICARE

## 2022-02-11 VITALS
HEIGHT: 64 IN | WEIGHT: 181 LBS | RESPIRATION RATE: 18 BRPM | DIASTOLIC BLOOD PRESSURE: 72 MMHG | SYSTOLIC BLOOD PRESSURE: 168 MMHG | HEART RATE: 59 BPM | BODY MASS INDEX: 30.9 KG/M2 | OXYGEN SATURATION: 99 % | TEMPERATURE: 95.7 F

## 2022-02-11 DIAGNOSIS — C50.412 MALIGNANT NEOPLASM OF UPPER-OUTER QUADRANT OF LEFT BREAST IN FEMALE, ESTROGEN RECEPTOR NEGATIVE (HCC): Primary | ICD-10-CM

## 2022-02-11 DIAGNOSIS — Z17.1 MALIGNANT NEOPLASM OF UPPER-OUTER QUADRANT OF LEFT BREAST IN FEMALE, ESTROGEN RECEPTOR NEGATIVE (HCC): Primary | ICD-10-CM

## 2022-02-11 LAB
ALBUMIN SERPL-MCNC: 4 GM/DL (ref 3.4–5)
ALP BLD-CCNC: 76 IU/L (ref 40–129)
ALT SERPL-CCNC: 53 U/L (ref 10–40)
ANION GAP SERPL CALCULATED.3IONS-SCNC: 11 MMOL/L (ref 4–16)
AST SERPL-CCNC: 45 IU/L (ref 15–37)
BASOPHILS ABSOLUTE: 0.1 K/CU MM
BASOPHILS RELATIVE PERCENT: 1.3 % (ref 0–1)
BILIRUB SERPL-MCNC: 1.1 MG/DL (ref 0–1)
BUN BLDV-MCNC: 14 MG/DL (ref 6–23)
CALCIUM SERPL-MCNC: 9.6 MG/DL (ref 8.3–10.6)
CHLORIDE BLD-SCNC: 101 MMOL/L (ref 99–110)
CO2: 26 MMOL/L (ref 21–32)
CREAT SERPL-MCNC: 0.8 MG/DL (ref 0.6–1.1)
DIFFERENTIAL TYPE: ABNORMAL
EOSINOPHILS ABSOLUTE: 0.7 K/CU MM
EOSINOPHILS RELATIVE PERCENT: 10.6 % (ref 0–3)
GFR AFRICAN AMERICAN: >60 ML/MIN/1.73M2
GFR NON-AFRICAN AMERICAN: >60 ML/MIN/1.73M2
GLUCOSE BLD-MCNC: 92 MG/DL (ref 70–99)
HCT VFR BLD CALC: 38.7 % (ref 37–47)
HEMOGLOBIN: 13.1 GM/DL (ref 12.5–16)
LYMPHOCYTES ABSOLUTE: 1.8 K/CU MM
LYMPHOCYTES RELATIVE PERCENT: 28.6 % (ref 24–44)
MCH RBC QN AUTO: 31.3 PG (ref 27–31)
MCHC RBC AUTO-ENTMCNC: 33.9 % (ref 32–36)
MCV RBC AUTO: 92.4 FL (ref 78–100)
MONOCYTES ABSOLUTE: 0.7 K/CU MM
MONOCYTES RELATIVE PERCENT: 11.7 % (ref 0–4)
PDW BLD-RTO: 14.4 % (ref 11.7–14.9)
PLATELET # BLD: 249 K/CU MM (ref 140–440)
PMV BLD AUTO: 9.3 FL (ref 7.5–11.1)
POTASSIUM SERPL-SCNC: 4.2 MMOL/L (ref 3.5–5.1)
RBC # BLD: 4.19 M/CU MM (ref 4.2–5.4)
SEGMENTED NEUTROPHILS ABSOLUTE COUNT: 3 K/CU MM
SEGMENTED NEUTROPHILS RELATIVE PERCENT: 47.8 % (ref 36–66)
SODIUM BLD-SCNC: 138 MMOL/L (ref 135–145)
TOTAL PROTEIN: 6.6 GM/DL (ref 6.4–8.2)
WBC # BLD: 6.2 K/CU MM (ref 4–10.5)

## 2022-02-11 PROCEDURE — 85025 COMPLETE CBC W/AUTO DIFF WBC: CPT

## 2022-02-11 PROCEDURE — 36591 DRAW BLOOD OFF VENOUS DEVICE: CPT

## 2022-02-11 PROCEDURE — 99214 OFFICE O/P EST MOD 30 MIN: CPT | Performed by: INTERNAL MEDICINE

## 2022-02-11 PROCEDURE — 6360000002 HC RX W HCPCS: Performed by: INTERNAL MEDICINE

## 2022-02-11 PROCEDURE — 80053 COMPREHEN METABOLIC PANEL: CPT

## 2022-02-11 PROCEDURE — 2580000003 HC RX 258: Performed by: INTERNAL MEDICINE

## 2022-02-11 PROCEDURE — 86300 IMMUNOASSAY TUMOR CA 15-3: CPT

## 2022-02-11 RX ORDER — SODIUM CHLORIDE 0.9 % (FLUSH) 0.9 %
20 SYRINGE (ML) INJECTION PRN
Status: DISCONTINUED | OUTPATIENT
Start: 2022-02-11 | End: 2022-02-12 | Stop reason: HOSPADM

## 2022-02-11 RX ORDER — HEPARIN SODIUM (PORCINE) LOCK FLUSH IV SOLN 100 UNIT/ML 100 UNIT/ML
500 SOLUTION INTRAVENOUS PRN
Status: DISCONTINUED | OUTPATIENT
Start: 2022-02-11 | End: 2022-02-12 | Stop reason: HOSPADM

## 2022-02-11 RX ADMIN — HEPARIN 500 UNITS: 100 SYRINGE at 10:01

## 2022-02-11 RX ADMIN — SODIUM CHLORIDE, PRESERVATIVE FREE 20 ML: 5 INJECTION INTRAVENOUS at 10:01

## 2022-02-11 ASSESSMENT — PATIENT HEALTH QUESTIONNAIRE - PHQ9
SUM OF ALL RESPONSES TO PHQ QUESTIONS 1-9: 0
2. FEELING DOWN, DEPRESSED OR HOPELESS: 0
1. LITTLE INTEREST OR PLEASURE IN DOING THINGS: 0
SUM OF ALL RESPONSES TO PHQ9 QUESTIONS 1 & 2: 0
SUM OF ALL RESPONSES TO PHQ QUESTIONS 1-9: 0

## 2022-02-11 NOTE — PROGRESS NOTES
MA Rooming Questions  Patient: Aura Blood  MRN: [id number]    Date: 2/11/2022        1. Do you have any new issues?   no         2. Do you need any refills on medications?    no    3. Have you had any imaging done since your last visit?   no    4. Have you been hospitalized or seen in the emergency room since your last visit here?   no    5. Did the patient have a depression screening completed today?  Yes    No data recorded     PHQ-9 Given to (if applicable):               PHQ-9 Score (if applicable):                     [] Positive     [x]  Negative              Does question #9 need addressed (if applicable)                     [] Yes    []  No               Pranay Martínez MA

## 2022-02-11 NOTE — PROGRESS NOTES
Pt reported today for port flush and lab draw. Has a Mediport on Right  side chest and was accessed with a 20 G  .75in  Gripper Plus Non-Coring safety needle and was accessed on 1st attempt, blood return was noted, labs were drawn and sent to lab for processing. Port was flushed with 20 cc's (0.9% Sodium Chloride Injection USP) and locked with 500 units of heparin. Pt tolerated procedure well.

## 2022-02-11 NOTE — PROGRESS NOTES
Patient Name: Venkata Luna  Patient : 1950  Patient MRN: [id number]     Primary Oncologist: Bozena Gomez MD  Referring Provider: Jewel Botello MD     Date of Service: 2022      Chief Complaint:   Chief Complaint   Patient presents with    Follow-up     Patient Active Problem List:     Malignant neoplasm of female breast      Postoperative nausea     Neutropenic fever      Alopecia (capitis) totalis    HPI:   Ms. Dionisio Gray is a 76-year-old very pleasant female with medical history significant for hypertension, hyperlipidemia, coronary artery disease, initially referred to me on 2020 for evaluation of his clinical stage IIA left breast, high grade, triple negative, invasive ductal carcinoma. She stated that she has echocardiogram and stress test on 2020. It showed normal EF (EF 51%). Concentric left ventricular hypertrophy and she has mild to moderate mitral regurfitation. She was incidentally noted to have left breast mass. She was subsequently evaluated by Dr. Brea Oakes and she requested diagnostic mammogram. She underwent diagnostic digital bilateral mammogram on 2020 and it showed dumbbell-shaped mass at 2-3 o'clock at middle to posterior depth with associated pleomorphic calcifications, in her left breast. Targeted ultrasound at 2 o'clock at a distance of 7 cm from the nipple demonstrates a dumbbell-shaped hypoechoic mass with angular margins that measures 3.3 x 2.5 x 2.1 cm. Internal vascularity noted along with some posterior acoustic shadowing. No suspicious axillary lymph nodes. She underwent ultrasound guided core biopsy of left breast and clip marker placement on 2020 and final pathology showed invasive ductal carcinoma. Tumor size is at least 12 mm and it is a high grade tumor. ER by IHC is negative (0%), PgR by IHC is negative (0%), Her2 by IHC is equivocal (score 2+) and Her2 by FISH is negative.      Since she is found to have clinical stage IIA left breast invasive ductal carcinoma, she was subsequently referred to me for further evaluation. She was seen by Dr. Lema Staff and she underwent left breast lumpectomy and sentinel lymph node biopsy on March 20, 2020. Final pathology showed stage IIA INVASIVE DUCTAL CARCINOMA WITH EXTENSIVE NECROSIS, GRADE 3, 2.8 CM. IN GREATEST DIMENSION. SURGICAL MARGINS ARE POSITIVE FOR MALIGNANCY. Ductal Carcinoma In Situ and lobular Carcinoma In Situ are not present. One sentinel lymph node examined was negative for metastasis. No lymphovascular or dermal lymphovascular invasion. Pathologic Stage Classification: pT2, N0, Mx. Repeat surgery for positive margins was done on March 30, 2020 and there was no residual cancer seen. Since she is found to have stage IIA left breast triple negative invasive ductal carcinoma, I recommend for adjuvant chemotherapy and radiation therapy. She had Echocardiogram on 2/2020 and her EF was 51%. Dose dense chemotherapy with doxorubicin/cyclophosphamide, followed by Paclitaxel was started on May 4, 2020 and she completed it on 10/19/2020. She received adjuvant radiation therapy between November 30, 2020 and December 29, 2020. DEXA scan done on 7/30/2020 showed osteopenia by WHO criteria. Mammogram done on 3/23/2021 showed no mammographic evidence of malignancy. Expected posttreatment changes of the left breast.  Normal follow-up is recommended in 12 months. Diagnostic mammogram and targeted ultrasound done on 9/9/2021 showed interval increase in focal asymmetry in the central left breast at the lumpectomy site correlating with a 1.6 x 1.9 x 2.5 cm spiculated, hypoechoic lesion seen on ultrasound. She subsequently underwent ultrasound-guided core needle biopsy on 9/14/2021 and final pathology showed hyaline fibrosis and a cavity with proteinaceous material.    On February 11, 2022, she presented to me for follow-up.   I have been following her for stage IIA left breast triple negative invasive ductal carcinoma and she is status post left breast lumpectomy and sentinel lymph node biopsy, adjuvant chemotherapy and adjuvant radiation therapy. She has been in a close observation since then. Reviewed with her findings of mammogram and biopsy done on . Since there is no evidence of malignancy on pathology, I believe the suspicious area seen on mammogram is due to post treatment changes. I recommend her to have repeat mammogram in . She does not have any signs or symptoms suggestive for recurrent or metastatic breast cancer on today visit. I will check CBC, CMP and CA 27-29 level today. She has significant neuropathy and it has not been controlled with Cymbalta 60 mg daily. Since Cymbalta has not been controlled her neuropathy, I recommend her to stop taking it. I started gabapentin 300 mg every night and she stopped taking it since she is concerned about side effects. Her neuropathy has gotten better with massage therapy. I will continue with observation at this moment. Health maintnance - I recommend her to have age-appropriate cancer screening, exercise, low-fat and low-sodium diet. Otherwise, I will see her again in 6 months. Besides neuropathy, she does not have any significant symptoms at today visit. Past Medical History  Significant for  1. Hypertension  2. Hyperlipidemia  3. Obesity  4. Coronary artery disease    Surgical History  Significant for  1. Shoulder repair in   2.  section in     Allergies  Tylenol-Codeine    Social History  She denies smoking, alcohol drinking and illicit drug abuse. She is currently living in [facility]. Family History  Father: Hypertension  Mother: Hypertension    Review of Systems: \"Per interval history; otherwise 10 point ROS is negative. \"  Her energy level is good and her sleep is fine.  She doesn't have fever, chills, night sweats, cough, shortness of breath, chest pain, hemoptysis or palpitations. Her bowel and bladder functions are normal. She denies nausea, vomiting, abdominal pain, diarrhea, constipation, dysuria, loss of appetite or weight loss. She doesn't have neuropathy and she denies bleeding or clotting issues. She doesn't have any pain in her body. Denies anxiety or depression. The rest of the systems are unremarkable. Vital Signs:  BP (!) 168/72 (Site: Right Upper Arm, Position: Sitting, Cuff Size: Large Adult)   Pulse 59   Temp 95.7 °F (35.4 °C) (Infrared)   Resp 18   Ht 5' 4\" (1.626 m)   Wt 181 lb (82.1 kg)   LMP  (LMP Unknown)   SpO2 99%   BMI 31.07 kg/m²     Physical Exam:  CONSTITUTIONAL: awake, no apparent distress, alert, cooperative,    EYES: pupils equal, round and reactive to light, sclera clear and conjunctiva normal  ENT: Normocephalic, without obvious abnormality, atraumatic  NECK: supple, symmetrical, no jugular venous distension and no carotid bruits   HEMATOLOGIC/LYMPHATIC: no cervical, supraclavicular or axillary lymphadenopathy   LUNGS: VBS, no wheezes, no increased work of breathing, no crackles, clear to auscultation, no rhonchi,   CARDIOVASCULAR: regular rate and rhythm, normal S1 and S2, no murmur noted  ABDOMEN: soft, non-distended, normal bowel sounds x 4, non-tender, no masses palpated, no hepatosplenomegaly   MUSCULOSKELETAL: full range of motion noted, tone is normal  NEUROLOGIC: awake, alert, oriented to name, place and time. Motor skills grossly intact. SKIN: Normal skin color, texture, turgor and no jaundice.  appears intact   EXTREMITIES: no clubbing, no leg swelling, no LE edema, no cyanosis,   BREASTS: Post surgical and radiation changes noted in left breast, no palpable mass in bilateral breasts and axilla     Labs:  Hematology:  Lab Results   Component Value Date    WBC 6.2 02/11/2022    RBC 4.19 (L) 02/11/2022    HGB 13.1 02/11/2022    HCT 38.7 02/11/2022    MCV 92.4 02/11/2022    MCH 31.3 (H) 02/11/2022 MCHC 33.9 02/11/2022    RDW 14.4 02/11/2022     02/11/2022    MPV 9.3 02/11/2022    BANDSPCT 31 (H) 06/13/2020    SEGSPCT 47.8 02/11/2022    EOSRELPCT 10.6 (H) 02/11/2022    BASOPCT 1.3 (H) 02/11/2022    LYMPHOPCT 28.6 02/11/2022    MONOPCT 11.7 (H) 02/11/2022    BANDABS 10.20 06/13/2020    SEGSABS 3.0 02/11/2022    EOSABS 0.7 02/11/2022    BASOSABS 0.1 02/11/2022    LYMPHSABS 1.8 02/11/2022    MONOSABS 0.7 02/11/2022    DIFFTYPE AUTOMATED DIFFERENTIAL 02/11/2022    ANISOCYTOSIS 1+ 06/13/2020    POLYCHROM 1+ 06/13/2020    WBCMORP OCCASIONAL 06/10/2020    PLTM FEW 06/13/2020     No results found for: ESR  Chemistry:  Lab Results   Component Value Date     08/11/2021    K 3.9 08/11/2021     08/11/2021    CO2 25 08/11/2021    BUN 15 08/11/2021    CREATININE 0.8 08/11/2021    GLUCOSE 145 (H) 08/11/2021    CALCIUM 9.6 08/11/2021    PROT 6.6 08/11/2021    LABALBU 4.7 08/11/2021    BILITOT 1.1 (H) 08/11/2021    ALKPHOS 71 08/11/2021    AST 29 08/11/2021    ALT 30 08/11/2021    LABGLOM >60 08/11/2021    GFRAA >60 08/11/2021    AGRATIO 1.9 01/29/2020    GLOB 2.3 01/29/2020    MG 1.6 (L) 06/12/2020     No results found for: MMA, LDH, HOMOCYSTEINE  No components found for: LD  Lab Results   Component Value Date    T4FREE 1.0 02/07/2019     Immunology:  Lab Results   Component Value Date    PROT 6.6 08/11/2021     No results found for: ARASH BuckleyLCBRAD  No results found for: B2M  Coagulation Panel:  No results found for: PROTIME, INR, APTT, DDIMER  Anemia Panel:  No results found for: FMYIYUKA88, FOLATE  Tumor Markers:  Lab Results   Component Value Date    LABCA2 22.0 08/11/2021     Observations:  PHQ-9 Total Score: 0 (2/11/2022  9:20 AM)       Assessment & Plan:   Clinical stage IIA left breast, high grade, triple negative, invasive ductal carcinoma    PLAN  Ms. Rodrigo Flores is a 76-year-old very pleasant female who was incidentally found to have left breast mass on Echocardiogram done on February 11, 2020. Further work ups with diagnostic digital mammogram and targeted sonogram of left breast revealed that she has about 3.3 cm mass in her left breast upper outer quadrant. She subsequently had ultrasound guided biopsy of the left breast mass on February 25, 2020 and final pathology revealed clinical stage IIA high grade, triple negative, left breast invasive ductal carcinoma. She was seen by Dr. Jerome Mistry and she underwent left breast lumpectomy and sentinel lymph node biopsy on March 20, 2020. Final pathology showed stage IIA INVASIVE DUCTAL CARCINOMA WITH EXTENSIVE NECROSIS, GRADE 3, 2.8 CM. IN GREATEST DIMENSION. SURGICAL MARGINS ARE POSITIVE FOR MALIGNANCY. Ductal Carcinoma In Situ and lobular Carcinoma In Situ are not present. One sentinel lymph node examined was negative for metastasis. No lymphovascular or dermal lymphovascular invasion. Pathologic Stage Classification: pT2, N0, Mx. Repeat surgery for positive margins was done on March 30, 2020 and there was no residual cancer seen. Since she is found to have stage IIA left breast triple negative invasive ductal carcinoma, I recommend for adjuvant chemotherapy and radiation therapy. She had Echocardiogram on 2/2020 and her EF was 51%. Dose dense chemotherapy with doxorubicin/cyclophosphamide, followed by Paclitaxel was started on May 4, 2020 and she completed it on 10/19/2020. She received adjuvant radiation therapy between November 30, 2020 and December 29, 2020. DEXA scan done on 7/30/2020 showed osteopenia by WHO criteria. Mammogram done on 3/23/2021 showed no mammographic evidence of malignancy. Expected posttreatment changes of the left breast.  Normal follow-up is recommended in 12 months.     Diagnostic mammogram and targeted ultrasound done on 9/9/2021 showed interval increase in focal asymmetry in the central left breast at the lumpectomy site correlating with a 1.6 x 1.9 x 2.5 cm spiculated, hypoechoic lesion seen on ultrasound. She subsequently underwent ultrasound-guided core needle biopsy on 9/14/2021 and final pathology showed hyaline fibrosis and a cavity with proteinaceous material.    On February 11, 2022, she presented to me for follow-up. I have been following her for stage IIA left breast triple negative invasive ductal carcinoma and she is status post left breast lumpectomy and sentinel lymph node biopsy, adjuvant chemotherapy and adjuvant radiation therapy. She has been in a close observation since then. Reviewed with her findings of mammogram and biopsy done on September, 2021. Since there is no evidence of malignancy on pathology, I believe the suspicious area seen on mammogram is due to post treatment changes. I recommend her to have repeat mammogram in September, 2022. She does not have any signs or symptoms suggestive for recurrent or metastatic breast cancer on today visit. I will check CBC, CMP and CA 27-29 level today. She has significant neuropathy and it has not been controlled with Cymbalta 60 mg daily. Since Cymbalta has not been controlled her neuropathy, I recommend her to stop taking it. I started gabapentin 300 mg every night and she stopped taking it since she is concerned about side effects. Her neuropathy has gotten better with massage therapy. I will continue with observation at this moment. Health maintnance - I recommend her to have age-appropriate cancer screening, exercise, low-fat and low-sodium diet. Otherwise, I will see her again in 6 months. I answered all her questions and concerns for today. I recommend her to follow-up with primary care physician, Dr. Gaby Clayton on regular basis. Recent imaging and labs were reviewed and discussed with the patient.

## 2022-02-16 LAB — CA 27.29: 25.5 U/ML

## 2022-02-23 PROBLEM — I44.7 LBBB (LEFT BUNDLE BRANCH BLOCK): Status: ACTIVE | Noted: 2022-02-23

## 2022-03-24 NOTE — PROGRESS NOTES
General Surgery Progress Note    Therese Walters is a 66 y.o. woman who initially presented with left breast cancer. She has no new complaints today. Left Breast History  Breast cancer: yes   Type:    Size:2.8 cm   Receptor Status: triple negative   Node Status: 0/1   Diagnosis Date:   Surgery, Date: lumpectomy with sentinel node bx, subsequent reexcision with negative margins 3/20/20   Chemotherapy: completed   Radiation Therapy: completed   Hormone Therapy: none   Oncotype/Genetic Testing: none  Most Recent Imaging:    Mammogram, Date: 9/9/21  Abnormal Imaging: yes - suspicious area left breast, bx on 9/14/21 showed hylan fibrosis without evidence of malignancy. Likely postsurgical changes     Exam:  Physical exam has been reviewed and updated  General: no acute distress  Breast:  The patient was examined in the upright and supine position. There is a well healed scar on the left breast. There are no new masses or changes in breast.  No nipple discharge or inversion. Her contralateral breast shows no new masses or changes in breast contour. There was no nipple inversion or discharge. There is no axillary lymphadenopathy palpated bilaterally. Respiratory: respirations are non-labored and there is no audible distress  Cardiovascular: regular rate, extremities appear well perfused  Neurologic: alert, oriented     Mammography:   Last mammogram: 9/2021  Personally reviewed by me. Interval increase in focal asymmetry in the central left breast at the   lumpectomy site correlating with a 1.6 x 1.9 x 2.5 cm spiculated, hypoechoic   lesion seen on ultrasound.  Though findings may represent post treatment   changes and fat necrosis, further evaluation with ultrasound-guided biopsy is   recommended     DEXA:  Last scan: 7/2020  Osteopenia      Assessment:  Therese Walters is a 66 y.o. woman who is now 2 years status-post left partial mastectomy and sentinel lymph node biopsy for invasive ductal carcinoma.     Plan: · Will need a mammogram in 9/22  · Recommend clinical breast exams in 6 month(s)  · DEXA due 7/22  · Recommend an active lifestyle, healthy diet, limited alcohol intake, achieve and maintain a healthy BMI to optimize breast cancer outcomes / decrease risk of breast cancer. I reassured her that based on her clinical examination and the imaging studies today there is no evidence of any suspicious findings at this time. I encouraged her to continue monthly self breast examinations and to alert me of any changes. All of the patient's questions were answered at this time however, she was encouraged to call the office with any further inquiries. Approximately 30 minutes of time were spent in preparation, direct patient contact, care coordination, documentation and activities otherwise related to this encounter.      Jay Gardner, APRN - CNP, APRN-CNP

## 2022-03-29 ENCOUNTER — OFFICE VISIT (OUTPATIENT)
Dept: SURGERY | Age: 72
End: 2022-03-29
Payer: MEDICARE

## 2022-03-29 VITALS
OXYGEN SATURATION: 99 % | WEIGHT: 183.3 LBS | DIASTOLIC BLOOD PRESSURE: 78 MMHG | BODY MASS INDEX: 31.29 KG/M2 | SYSTOLIC BLOOD PRESSURE: 126 MMHG | HEIGHT: 64 IN | HEART RATE: 58 BPM

## 2022-03-29 DIAGNOSIS — C50.912 INFILTRATING DUCTAL CARCINOMA OF LEFT BREAST (HCC): Primary | ICD-10-CM

## 2022-03-29 PROCEDURE — 99214 OFFICE O/P EST MOD 30 MIN: CPT | Performed by: NURSE PRACTITIONER

## 2022-03-29 ASSESSMENT — PATIENT HEALTH QUESTIONNAIRE - PHQ9
2. FEELING DOWN, DEPRESSED OR HOPELESS: 0
SUM OF ALL RESPONSES TO PHQ QUESTIONS 1-9: 0
1. LITTLE INTEREST OR PLEASURE IN DOING THINGS: 0
SUM OF ALL RESPONSES TO PHQ QUESTIONS 1-9: 0
SUM OF ALL RESPONSES TO PHQ9 QUESTIONS 1 & 2: 0

## 2022-07-14 ENCOUNTER — TELEPHONE (OUTPATIENT)
Dept: FAMILY MEDICINE CLINIC | Age: 72
End: 2022-07-14

## 2022-07-14 ENCOUNTER — OFFICE VISIT (OUTPATIENT)
Dept: FAMILY MEDICINE CLINIC | Age: 72
End: 2022-07-14
Payer: MEDICARE

## 2022-07-14 VITALS
OXYGEN SATURATION: 97 % | HEIGHT: 64 IN | DIASTOLIC BLOOD PRESSURE: 80 MMHG | SYSTOLIC BLOOD PRESSURE: 122 MMHG | TEMPERATURE: 97.3 F | HEART RATE: 53 BPM | RESPIRATION RATE: 16 BRPM | BODY MASS INDEX: 30.93 KG/M2 | WEIGHT: 181.2 LBS

## 2022-07-14 DIAGNOSIS — I25.10 CORONARY ARTERY DISEASE INVOLVING NATIVE CORONARY ARTERY OF NATIVE HEART WITHOUT ANGINA PECTORIS: ICD-10-CM

## 2022-07-14 DIAGNOSIS — F51.04 PSYCHOPHYSIOLOGICAL INSOMNIA: ICD-10-CM

## 2022-07-14 DIAGNOSIS — I10 ESSENTIAL HYPERTENSION: Primary | ICD-10-CM

## 2022-07-14 DIAGNOSIS — T45.1X5A CHEMOTHERAPY-INDUCED NEUROPATHY (HCC): ICD-10-CM

## 2022-07-14 DIAGNOSIS — Z23 NEED FOR COVID-19 VACCINE: ICD-10-CM

## 2022-07-14 DIAGNOSIS — G62.0 CHEMOTHERAPY-INDUCED NEUROPATHY (HCC): ICD-10-CM

## 2022-07-14 DIAGNOSIS — I25.10 CORONARY ARTERY DISEASE INVOLVING NATIVE CORONARY ARTERY OF NATIVE HEART WITHOUT ANGINA PECTORIS: Primary | ICD-10-CM

## 2022-07-14 DIAGNOSIS — M85.89 OSTEOPENIA OF MULTIPLE SITES: ICD-10-CM

## 2022-07-14 DIAGNOSIS — E78.5 HYPERLIPIDEMIA, UNSPECIFIED HYPERLIPIDEMIA TYPE: ICD-10-CM

## 2022-07-14 DIAGNOSIS — I25.119 ATHEROSCLEROSIS OF NATIVE CORONARY ARTERY OF NATIVE HEART WITH ANGINA PECTORIS (HCC): ICD-10-CM

## 2022-07-14 DIAGNOSIS — E66.9 OBESITY (BMI 30.0-34.9): ICD-10-CM

## 2022-07-14 DIAGNOSIS — I20.9 ANGINA PECTORIS, UNSPECIFIED (HCC): ICD-10-CM

## 2022-07-14 LAB
CREATININE URINE: 138.7 MG/DL (ref 28–259)
MICROALBUMIN UR-MCNC: 2.9 MG/DL
MICROALBUMIN/CREAT UR-RTO: 20.9 MG/G (ref 0–30)

## 2022-07-14 PROCEDURE — 99214 OFFICE O/P EST MOD 30 MIN: CPT | Performed by: FAMILY MEDICINE

## 2022-07-14 PROCEDURE — 1123F ACP DISCUSS/DSCN MKR DOCD: CPT | Performed by: FAMILY MEDICINE

## 2022-07-14 RX ORDER — ASPIRIN 81 MG/1
81 TABLET ORAL DAILY
Qty: 90 TABLET | Refills: 3 | Status: SHIPPED | OUTPATIENT
Start: 2022-07-14

## 2022-07-14 RX ORDER — ATORVASTATIN CALCIUM 10 MG/1
10 TABLET, FILM COATED ORAL DAILY
Qty: 90 TABLET | Refills: 3 | Status: SHIPPED | OUTPATIENT
Start: 2022-07-14 | End: 2022-07-14 | Stop reason: DRUGHIGH

## 2022-07-14 RX ORDER — NITROGLYCERIN 0.4 MG/1
0.4 TABLET SUBLINGUAL EVERY 5 MIN PRN
Qty: 25 TABLET | Refills: 1 | Status: SHIPPED | OUTPATIENT
Start: 2022-07-14

## 2022-07-14 RX ORDER — NITROGLYCERIN 0.3 MG/1
0.3 TABLET SUBLINGUAL EVERY 5 MIN PRN
Qty: 25 TABLET | Refills: 0 | Status: SHIPPED | OUTPATIENT
Start: 2022-07-14 | End: 2022-07-14 | Stop reason: DRUGHIGH

## 2022-07-14 RX ORDER — ATENOLOL 100 MG/1
100 TABLET ORAL DAILY
Qty: 90 TABLET | Refills: 1 | Status: SHIPPED | OUTPATIENT
Start: 2022-07-14

## 2022-07-14 RX ORDER — ATORVASTATIN CALCIUM 40 MG/1
40 TABLET, FILM COATED ORAL DAILY
Qty: 90 TABLET | Refills: 3 | Status: SHIPPED | OUTPATIENT
Start: 2022-07-14

## 2022-07-14 ASSESSMENT — ENCOUNTER SYMPTOMS
SHORTNESS OF BREATH: 0
CHEST TIGHTNESS: 0

## 2022-07-14 NOTE — TELEPHONE ENCOUNTER
Patients pharmacy called stating the patients script for nitroglycerin 0.3 mg is 25 tablets, 0.3 tablets comes in 100 he stated he can NOT open up the 100 pack just for 25 tablets he stated the nitroglycerin is light sensitive, he would have to throw the rest away. Pharmacy is asking if you can change the script to 0.4 that comes in 25 tablets.  Please change script and send in to [address] on [facility]

## 2022-07-14 NOTE — PROGRESS NOTES
Patient ID: Asa Cotton 1950    . Chief Complaint   Patient presents with    Hyperlipidemia    Hypertension         Hyperlipidemia  This is a chronic problem. The current episode started more than 1 year ago. The problem is controlled. Pertinent negatives include no shortness of breath. Current antihyperlipidemic treatment includes statins. Risk factors for coronary artery disease include post-menopausal, hypertension and obesity. Hypertension  This is a chronic problem. The current episode started more than 1 year ago. The problem is unchanged. The problem is controlled. Pertinent negatives include no palpitations or shortness of breath. Risk factors for coronary artery disease include post-menopausal state and obesity. Past treatments include angiotensin blockers. There are no compliance problems. Coronary Artery Disease  Presents for follow-up visit. Pertinent negatives include no chest tightness, leg swelling, palpitations or shortness of breath. Risk factors include hyperlipidemia. The symptoms have been stable (Refill of nitroglycerin. She sees her cardiologist yearly.-No major changes). Insomnia: difficulty falling asleep. Mind keeps racing    Chemotherapy-induced neuropathy: Keeps her up at night. Prevents her from walking long distances for exercise. Gabapentin given by her oncologist is not helping. She has stopped taking it. Review of Systems   Respiratory: Negative for chest tightness and shortness of breath. Cardiovascular: Negative for palpitations and leg swelling. Patient Active Problem List   Diagnosis    Essential hypertension    Hyperlipidemia    History of colon polyps    Obesity (BMI 30.0-34. 9)    Osteopenia of multiple sites    Coronary artery disease involving native coronary artery of native heart without angina pectoris    Hepatic steatosis    Malignant neoplasm of upper-outer quadrant of left breast in female, estrogen receptor negative ([facility] [address]  Postoperative nausea    Alopecia (capitis) totalis    Diarrhea due to drug    Chemotherapy-induced neuropathy (HCC)    LBBB (left bundle branch block)    Angina pectoris, unspecified    Atherosclerotic heart disease of native coronary artery with unspecified angina pectoris       Past Surgical History:   Procedure Laterality Date    BREAST BIOPSY Left 2020    BREAST BIOPSY Left 2020    sentinal node, partial mastectomy left    BREAST LUMPECTOMY Left 3/20/2020    LEFT BREAST LUMPECTOMY WITH NEEDLE LOC AND SENTINEL NODE BIOPSY performed by Bella Guo MD at [address] Left 3/30/2020    BREAST LESION RE EXCISION LEFT LUMPECTOMY SITE performed by Bella Guo MD at [address] TEST  2009    treadmill, Dr. Li Seen Right     right     SECTION      [facility]COPY  2009 [facility]    COLONOSCOPY  2015    diverticulosis, internal hemorroids.  repeat 5 years. Candace Carbon PORT SURGERY Right 2020    PORT SURGERY Right 3/20/2020    PORT INSERTION performed by Bella Guo MD at [address] Right 2016    TUBAL LIGATION  1980    US BREAST NEEDLE BIOPSY LEFT Left 2021    US BREAST NEEDLE BIOPSY LEFT 2021 Karan Morales  [address]       Family History   Problem Relation Age of Onset    Stroke Father     Coronary Art Dis Father     High Blood Pressure Father     Heart Attack Brother 72    High Blood Pressure Mother     No Known Problems Sister        Current Outpatient Medications on File Prior to Visit   Medication Sig Dispense Refill    Omega-3 Fatty Acids (FISH OIL PO) Take 1 tablet by mouth daily      Probiotic Product (ALIGN PO) Take 1 tablet by mouth daily      vitamin B-12 (CYANOCOBALAMIN) 1000 MCG tablet Take 1,000 mcg by mouth daily      clobetasol (OLUX) 0.05 % foam       ketoconazole (NIZORAL) 2 % shampoo  losartan (COZAAR) 50 MG tablet Take 1 tablet by mouth daily      Multiple Vitamin (MULTIVITAMIN PO) Take  by mouth daily. No current facility-administered medications on file prior to visit. Objective:         Physical Exam  Vitals and nursing note reviewed. Constitutional:       Appearance: She is well-developed. HENT:      Head: Normocephalic and atraumatic. Cardiovascular:      Rate and Rhythm: Normal rate and regular rhythm. Heart sounds: Normal heart sounds, S1 normal and S2 normal.   Pulmonary:      Effort: Pulmonary effort is normal. No respiratory distress. Breath sounds: Normal breath sounds. No wheezing. Musculoskeletal:      Cervical back: Neck supple. Right lower leg: No edema. Left lower leg: No edema. Skin:     General: Skin is warm and dry. Neurological:      Mental Status: She is alert. Vitals:    07/14/22 0811   BP: 122/80   Site: Left Upper Arm   Position: Sitting   Cuff Size: Large Adult   Pulse: 53   Resp: 16   Temp: 97.3 °F (36.3 °C)   SpO2: 97%   Weight: 181 lb 3.2 oz (82.2 kg)   Height: 5' 4\" (1.626 m)     Body mass index is 31.1 kg/m². Wt Readings from Last 3 Encounters:   07/14/22 181 lb 3.2 oz (82.2 kg)   03/29/22 183 lb 4.8 oz (83.1 kg)   02/11/22 181 lb (82.1 kg)     BP Readings from Last 3 Encounters:   07/14/22 122/80   03/29/22 126/78   02/11/22 (!) 168/72          No results found for this visit on 07/14/22. The 10-year ASCVD risk score (Jeralene Brunner., et al., 2013) is: 13.3%    Values used to calculate the score:      Age: 66 years      Sex: Female      Is Non- : No      Diabetic: No      Tobacco smoker: No      Systolic Blood Pressure: 265 mmHg      Is BP treated: Yes      HDL Cholesterol: 72 mg/dL      Total Cholesterol: 166 mg/dL  Lab Review   No visits with results within 2 Month(s) from this visit.    Latest known visit with results is:   Hospital Outpatient Visit on 02/11/2022 Component Date Value    CA 27.29 02/11/2022 25.5     Sodium 02/11/2022 138     Potassium 02/11/2022 4.2     Chloride 02/11/2022 101     CO2 02/11/2022 26     BUN 02/11/2022 14     CREATININE 02/11/2022 0.8     Glucose 02/11/2022 92     Calcium 02/11/2022 9.6     Albumin 02/11/2022 4.0     Total Protein 02/11/2022 6.6     Total Bilirubin 02/11/2022 1.1*    ALT 02/11/2022 53*    AST 02/11/2022 45*    Alkaline Phosphatase 02/11/2022 76     GFR Non- 02/11/2022 >60     GFR  02/11/2022 >60     Anion Gap 02/11/2022 11     WBC 02/11/2022 6.2     RBC 02/11/2022 4.19*    Hemoglobin 02/11/2022 13.1     Hematocrit 02/11/2022 38.7     MCV 02/11/2022 92.4     MCH 02/11/2022 31.3*    MCHC 02/11/2022 33.9     RDW 02/11/2022 14.4     Platelets 77/06/7068 249     MPV 02/11/2022 9.3     Differential Type 02/11/2022 AUTOMATED DIFFERENTIAL     Segs Relative 02/11/2022 47.8     Lymphocytes % 02/11/2022 28.6     Monocytes % 02/11/2022 11.7*    Eosinophils % 02/11/2022 10.6*    Basophils % 02/11/2022 1.3*    Segs Absolute 02/11/2022 3.0     Lymphocytes Absolute 02/11/2022 1.8     Monocytes Absolute 02/11/2022 0.7     Eosinophils Absolute 02/11/2022 0.7     Basophils Absolute 02/11/2022 0.1            Assessment:       Diagnosis Orders   1. Essential hypertension  atenolol (TENORMIN) 100 MG tablet    Microalbumin / Creatinine Urine Ratio   2. Hyperlipidemia, unspecified hyperlipidemia type  DISCONTINUED: atorvastatin (LIPITOR) 10 MG tablet   3. Obesity (BMI 30.0-34.9)     4. Osteopenia of multiple sites  Calcium Carb-Cholecalciferol (CALTRATE 600+D) 600-800 MG-UNIT TABS   5. Chemotherapy-induced neuropathy ([address]     6. Psychophysiological insomnia     7. Coronary artery disease involving native coronary artery of native heart without angina pectoris  nitroGLYCERIN (NITROSTAT) 0.3 MG SL tablet    atorvastatin (LIPITOR) 40 MG tablet   8.  Angina pectoris, unspecified 9. Atherosclerosis of native coronary artery of native heart with angina pectoris (HCC)  aspirin EC 81 MG EC tablet    atorvastatin (LIPITOR) 40 MG tablet   10. Need for COVID-19 vaccine             Plan:      Since she has a history of coronary artery disease, will increase the Lipitor from 10 mg to 40 mg.  Recheck fasting      Hypertension stable. Continue the atenolol. Recommend melatonin at dinnertime for the insomnia    Get covid booster    Unfortunately, recent studies indicate that the gabapentin to help with chemotherapy-induced neuropathy. She can probably discontinue this medication since is not working anyway.    Return in about 25 weeks (around 1/5/2023) for HTN, Hyperlipid, Insomnia, Fast.

## 2022-07-14 NOTE — PATIENT INSTRUCTIONS
BRING YOUR MEDICATION BOTTLES TO ALL APPOINTMENTS    Check MY CHART for test results. If you have forgotten your password, call [phone]. The diagnoses and medications listed in this after visit summary may not be up to date. Check MY CHART in 28-48 hours forcorrections. Late cancellation policy: So that we can better accommodate people who are sick, please give our office 24 hour notice for an appointment cancellation. Missed appointments: Your care is very important to us. It is important that you keep your scheduled appointments. Multiple missed appointments will lead to a dismissal from the office. Patients arriving late will be worked into the schedule as time permits, with patients arriving on time taken as scheduled. Late arriving patients are more than welcome to wait or reschedule their appointments. Please allow 5-7 business days for paperwork to be processed. CHECK OUT [facility] [address]     Walking for Exercise: Care Instructions  Your Care Instructions    Walking is one of the easiest ways to get the exercise you need for good health. A brisk, 30-minute walk each day can help you feel better and have more energy. It can help you lower your risk of disease. Walking can help you keep your bones strong and your heart healthy. Check with your doctor before you start a walking plan if you have heart problems, other health issues, or you have not been active in a long time. Follow your doctor's instructions for safe levels of exercise. Follow-up care is a key part of your treatment and safety. Be sure to make and go to all appointments, and call your doctor if you are having problems. It's also a good idea to know your test results and keep a list of the medicines you take. How can you care for yourself at home? Getting started  · Start slowly and set a short-term goal. For example, walk for 5 or 10 minutes every day.   · Bit by bit, increase the amount you walk every day. Try for at least 30 minutes on most days of the week. You also may want to swim, bike, or do other activities. · If finding enough time is a problem, it is fine to be active in blocks of 10 minutes or more throughout your day and week. · To get the heart-healthy benefits of walking, you need to walk briskly enough to increase your heart rate and breathing, but not so fast that you cannot talk comfortably. · Wear comfortable shoes that fit well and provide good support for your feet and ankles. Staying with your plan  · After you've made walking a habit, set a longer-term goal. You may want to set a goal of walking briskly for longer or walking farther. Experts say to do 2½ hours of moderate activity a week. A faster heartbeat is what defines moderate-level activity. · To stay motivated, walk with friends, coworkers, or pets. · Use a phone barbie or pedometer to track your steps each day. Set a goal to increase your steps. Once you get there, set a higher goal. Aim for 10,000 steps a day. · If the weather keeps you from walking outside, go for walks at the mall with a friend. Local schools and churches may have indoor gyms where you can walk. Fitting a walk into your workday  · Park several blocks away from work, or get off the bus a few stops early. · Use the stairs instead of the elevator, at least for a few floors. · Suggest holding meetings with colleagues during a walk inside or outside the building. · Use the restroom that is the farthest from your desk or workstation. · Use your morning and afternoon breaks to take quick 15-minute walks. Staying safe  · Know your surroundings. Walk in a well-lighted, safe place. If it is dark, walk with a partner. Wear light-colored clothing. If you can, buy a vest or jacket that reflects light. · Carry a cell phone for emergencies. · Drink plenty of water. Take a water bottle with you when you walk.  This is very important if it is hot out.  · Be careful not to slip on wet or icy ground. You can buy \"grippers\" for your shoes to help keep you from slipping. · Pay attention to your walking surface. Use sidewalks and paths. · If you have breathing problems like asthma or COPD, ask your doctor when it is safe for you to walk outdoors. Cold, dry air, smog, pollen, or other things in the air could cause breathing problems. Where can you learn more? Go to https://[employer]. org and sign in to your [employer] account. Enter R159 in the Favim box to learn more about \"Walking for Exercise: Care Instructions. \"     If you do not have an account, please click on the \"Sign Up Now\" link. Current as of: August 19, 2018  Content Version: 12.0  © 0799-9975 Healthwise, Incorporated. Care instructions adapted under license by [facility] ([facility]). If you have questions about a medical condition or this instruction, always ask your healthcare professional. [address] any warranty or liability for your use of this information.

## 2022-08-12 ENCOUNTER — OFFICE VISIT (OUTPATIENT)
Dept: ONCOLOGY | Age: 72
End: 2022-08-12
Payer: MEDICARE

## 2022-08-12 ENCOUNTER — HOSPITAL ENCOUNTER (OUTPATIENT)
Dept: INFUSION THERAPY | Age: 72
Discharge: HOME | End: 2022-08-12
Payer: MEDICARE

## 2022-08-12 VITALS
DIASTOLIC BLOOD PRESSURE: 66 MMHG | OXYGEN SATURATION: 98 % | HEIGHT: 64 IN | BODY MASS INDEX: 31.07 KG/M2 | TEMPERATURE: 97.8 F | WEIGHT: 182 LBS | SYSTOLIC BLOOD PRESSURE: 151 MMHG | HEART RATE: 52 BPM

## 2022-08-12 DIAGNOSIS — Z17.1 MALIGNANT NEOPLASM OF UPPER-OUTER QUADRANT OF LEFT BREAST IN FEMALE, ESTROGEN RECEPTOR NEGATIVE (HCC): Primary | ICD-10-CM

## 2022-08-12 DIAGNOSIS — M85.89 OSTEOPENIA OF MULTIPLE SITES: ICD-10-CM

## 2022-08-12 DIAGNOSIS — C50.412 MALIGNANT NEOPLASM OF UPPER-OUTER QUADRANT OF LEFT BREAST IN FEMALE, ESTROGEN RECEPTOR NEGATIVE (HCC): Primary | ICD-10-CM

## 2022-08-12 DIAGNOSIS — N95.1 MENOPAUSAL STATE: ICD-10-CM

## 2022-08-12 LAB
ALBUMIN SERPL-MCNC: 4.4 GM/DL (ref 3.4–5)
ALP BLD-CCNC: 71 IU/L (ref 40–128)
ALT SERPL-CCNC: 41 U/L (ref 10–40)
ANION GAP SERPL CALCULATED.3IONS-SCNC: 9 MMOL/L (ref 4–16)
AST SERPL-CCNC: 34 IU/L (ref 15–37)
BASOPHILS ABSOLUTE: 0.1 K/CU MM
BASOPHILS RELATIVE PERCENT: 1.4 % (ref 0–1)
BILIRUB SERPL-MCNC: 1.1 MG/DL (ref 0–1)
BUN BLDV-MCNC: 14 MG/DL (ref 6–23)
CALCIUM SERPL-MCNC: 9.6 MG/DL (ref 8.3–10.6)
CHLORIDE BLD-SCNC: 103 MMOL/L (ref 99–110)
CO2: 27 MMOL/L (ref 21–32)
CREAT SERPL-MCNC: 0.8 MG/DL (ref 0.6–1.1)
DIFFERENTIAL TYPE: ABNORMAL
EOSINOPHILS ABSOLUTE: 0.6 K/CU MM
EOSINOPHILS RELATIVE PERCENT: 8.8 % (ref 0–3)
GFR AFRICAN AMERICAN: >60 ML/MIN/1.73M2
GFR NON-AFRICAN AMERICAN: >60 ML/MIN/1.73M2
GLUCOSE BLD-MCNC: 92 MG/DL (ref 70–99)
HCT VFR BLD CALC: 37.6 % (ref 37–47)
HEMOGLOBIN: 12.6 GM/DL (ref 12.5–16)
LYMPHOCYTES ABSOLUTE: 1.5 K/CU MM
LYMPHOCYTES RELATIVE PERCENT: 23 % (ref 24–44)
MCH RBC QN AUTO: 31.7 PG (ref 27–31)
MCHC RBC AUTO-ENTMCNC: 33.5 % (ref 32–36)
MCV RBC AUTO: 94.7 FL (ref 78–100)
MONOCYTES ABSOLUTE: 0.7 K/CU MM
MONOCYTES RELATIVE PERCENT: 11 % (ref 0–4)
PDW BLD-RTO: 14.5 % (ref 11.7–14.9)
PLATELET # BLD: 254 K/CU MM (ref 140–440)
PMV BLD AUTO: 9.3 FL (ref 7.5–11.1)
POTASSIUM SERPL-SCNC: 4.2 MMOL/L (ref 3.5–5.1)
RBC # BLD: 3.97 M/CU MM (ref 4.2–5.4)
SEGMENTED NEUTROPHILS ABSOLUTE COUNT: 3.5 K/CU MM
SEGMENTED NEUTROPHILS RELATIVE PERCENT: 55.8 % (ref 36–66)
SODIUM BLD-SCNC: 139 MMOL/L (ref 135–145)
TOTAL PROTEIN: 6.4 GM/DL (ref 6.4–8.2)
WBC # BLD: 6.4 K/CU MM (ref 4–10.5)

## 2022-08-12 PROCEDURE — 85025 COMPLETE CBC W/AUTO DIFF WBC: CPT

## 2022-08-12 PROCEDURE — 6360000002 HC RX W HCPCS: Performed by: INTERNAL MEDICINE

## 2022-08-12 PROCEDURE — 99213 OFFICE O/P EST LOW 20 MIN: CPT | Performed by: INTERNAL MEDICINE

## 2022-08-12 PROCEDURE — 36591 DRAW BLOOD OFF VENOUS DEVICE: CPT

## 2022-08-12 PROCEDURE — 86300 IMMUNOASSAY TUMOR CA 15-3: CPT

## 2022-08-12 PROCEDURE — 1123F ACP DISCUSS/DSCN MKR DOCD: CPT | Performed by: INTERNAL MEDICINE

## 2022-08-12 PROCEDURE — 80053 COMPREHEN METABOLIC PANEL: CPT

## 2022-08-12 PROCEDURE — 2580000003 HC RX 258: Performed by: INTERNAL MEDICINE

## 2022-08-12 RX ORDER — HEPARIN SODIUM (PORCINE) LOCK FLUSH IV SOLN 100 UNIT/ML 100 UNIT/ML
500 SOLUTION INTRAVENOUS PRN
Status: DISCONTINUED | OUTPATIENT
Start: 2022-08-12 | End: 2022-08-13 | Stop reason: HOSPADM

## 2022-08-12 RX ORDER — SODIUM CHLORIDE 0.9 % (FLUSH) 0.9 %
10 SYRINGE (ML) INJECTION PRN
Status: DISCONTINUED | OUTPATIENT
Start: 2022-08-12 | End: 2022-08-13 | Stop reason: HOSPADM

## 2022-08-12 RX ORDER — DULOXETIN HYDROCHLORIDE 60 MG/1
60 CAPSULE, DELAYED RELEASE ORAL DAILY
Qty: 30 CAPSULE | Refills: 3 | Status: SHIPPED | OUTPATIENT
Start: 2022-08-12

## 2022-08-12 RX ADMIN — HEPARIN 500 UNITS: 100 SYRINGE at 10:21

## 2022-08-12 RX ADMIN — SODIUM CHLORIDE, PRESERVATIVE FREE 10 ML: 5 INJECTION INTRAVENOUS at 10:20

## 2022-08-12 ASSESSMENT — PATIENT HEALTH QUESTIONNAIRE - PHQ9
SUM OF ALL RESPONSES TO PHQ QUESTIONS 1-9: 0
SUM OF ALL RESPONSES TO PHQ QUESTIONS 1-9: 0
2. FEELING DOWN, DEPRESSED OR HOPELESS: 0
SUM OF ALL RESPONSES TO PHQ QUESTIONS 1-9: 0
SUM OF ALL RESPONSES TO PHQ9 QUESTIONS 1 & 2: 0
SUM OF ALL RESPONSES TO PHQ QUESTIONS 1-9: 0
1. LITTLE INTEREST OR PLEASURE IN DOING THINGS: 0

## 2022-08-12 NOTE — PROGRESS NOTES
MA Rooming Questions  Patient: Regina Barbosa  MRN: [id number]    Date: 8/12/2022        1. Do you have any new issues?   no         2. Do you need any refills on medications?    no    3. Have you had any imaging done since your last visit? yes - LABS 2/11    4. Have you been hospitalized or seen in the emergency room since your last visit here?   no    5. Did the patient have a depression screening completed today?  Yes    PHQ-9 Total Score: 0 (8/12/2022  9:59 AM)       PHQ-9 Given to (if applicable):               PHQ-9 Score (if applicable):                     [] Positive     []  Negative              Does question #9 need addressed (if applicable)                     [] Yes    []  No               Woodrow Brizuela CMA

## 2022-08-12 NOTE — PROGRESS NOTES
Patient Name: Tereso Alcala  Patient : 1950  Patient MRN: [id number]     Primary Oncologist: Isacc Ayala MD  Referring Provider: Chai Watts MD     Date of Service: 2022      Chief Complaint:   Chief Complaint   Patient presents with    Follow-up    Treatment     Patient Active Problem List:     Malignant neoplasm of female breast      Postoperative nausea     Neutropenic fever      Alopecia (capitis) totalis    HPI:   Ms. Ale Crabtree is a 18-year-old very pleasant female with medical history significant for hypertension, hyperlipidemia, coronary artery disease, initially referred to me on 2020 for evaluation of his clinical stage IIA left breast, high grade, triple negative, invasive ductal carcinoma. She stated that she has echocardiogram and stress test on 2020. It showed normal EF (EF 51%). Concentric left ventricular hypertrophy and she has mild to moderate mitral regurfitation. She was incidentally noted to have left breast mass. She was subsequently evaluated by Dr. Denisha Justin and she requested diagnostic mammogram. She underwent diagnostic digital bilateral mammogram on 2020 and it showed dumbbell-shaped mass at 2-3 o'clock at middle to posterior depth with associated pleomorphic calcifications, in her left breast. Targeted ultrasound at 2 o'clock at a distance of 7 cm from the nipple demonstrates a dumbbell-shaped hypoechoic mass with angular margins that measures 3.3 x 2.5 x 2.1 cm. Internal vascularity noted along with some posterior acoustic shadowing. No suspicious axillary lymph nodes. She underwent ultrasound guided core biopsy of left breast and clip marker placement on 2020 and final pathology showed invasive ductal carcinoma. Tumor size is at least 12 mm and it is a high grade tumor. ER by IHC is negative (0%), PgR by IHC is negative (0%), Her2 by IHC is equivocal (score 2+) and Her2 by FISH is negative.      Since she is found to have IIA left breast triple negative invasive ductal carcinoma and she is status post left breast lumpectomy and sentinel lymph node biopsy, adjuvant chemotherapy and adjuvant radiation therapy. She has been under close observation since then. Reviewed with her findings of mammogram and biopsy done on . Since there is no evidence of malignancy on pathology, I believe the suspicious area seen on mammogram is due to post treatment changes. I recommend her to have repeat mammogram in . I will schedule for it on 9/15/22 and will follow up with the results. I will also request DEXA scan since she is due for one. She does not have any signs or symptoms suggestive for recurrent or metastatic breast cancer on today visit. I will check CBC, CMP and CA 27-29 level today. She has significant neuropathy and it has not been controlled with Cymbalta 60 mg daily before. Since Cymbalta has not been controlled her neuropathy, I recommend her to stop taking it. I started gabapentin 300 mg every night and she stopped taking it since she is concerned about side effects. She is willing to re try cymbalta and I gave new prescription today. I will follow up with her symptom. Her neuropathy has gotten better with massage therapy. I will continue with observation at this moment. Health maintnance - I recommend her to have age-appropriate cancer screening, exercise, low-fat and low-sodium diet. Otherwise, I will see her again in 6 months. Besides neuropathy, she does not have any significant symptoms at today visit. Past Medical History  Significant for  1. Hypertension  2. Hyperlipidemia  3. Obesity  4. Coronary artery disease    Surgical History  Significant for  1. Shoulder repair in   2.  section in     Allergies  Tylenol-Codeine    Social History  She denies smoking, alcohol drinking and illicit drug abuse.   She is currently living in [place]. Family History  Father: Hypertension  Mother: Hypertension    Review of Systems: \"Per interval history; otherwise 10 point ROS is negative. \"  Her energy level is stable and her sleep is fine. She doesn't have fever, chills, night sweats, cough, shortness of breath, chest pain, hemoptysis or palpitations. Her bowel and bladder functions are normal. She denies nausea, vomiting, abdominal pain, diarrhea, constipation, dysuria, loss of appetite or weight loss. She doesn't have neuropathy and she denies bleeding or clotting issues. She denies any pain in her body. No anxiety or depression. The rest of the systems are unremarkable. Vital Signs:  BP (!) 151/66 (Site: Left Upper Arm, Position: Sitting, Cuff Size: Large Adult)   Pulse 52   Temp 97.8 °F (36.6 °C) (Temporal)   Ht 5' 4\" (1.626 m)   Wt 182 lb (82.6 kg)   LMP  (LMP Unknown)   SpO2 98%   BMI 31.24 kg/m²     Physical Exam:  CONSTITUTIONAL: awake, no apparent distress, alert, cooperative,    EYES: pupils equal, round and reactive to light, sclera clear and conjunctiva normal  ENT: Normocephalic, without obvious abnormality, atraumatic  NECK: supple, symmetrical, no jugular venous distension and no carotid bruits   HEMATOLOGIC/LYMPHATIC: no cervical, supraclavicular or axillary lymphadenopathy   LUNGS: VBS, no wheezes, no increased work of breathing, no crackles, clear to auscultation, no rhonchi,   CARDIOVASCULAR: regular rate and rhythm, normal S1 and S2, no murmur noted  ABDOMEN: soft, non-distended, normal bowel sounds x 4, non-tender, no masses palpated, no hepatosplenomegaly   MUSCULOSKELETAL: full range of motion noted, tone is normal  NEUROLOGIC: awake, alert, oriented to name, place and time. Motor skills grossly intact. SKIN: Normal skin color, texture, turgor and no jaundice.  appears intact   EXTREMITIES: no clubbing, no cyanosis, no leg swelling, no LE edema,   BREASTS: Post surgical and radiation changes noted in left breast, no palpable mass in bilateral breasts and axilla     Labs:  Hematology:  Lab Results   Component Value Date    WBC 6.2 02/11/2022    RBC 4.19 (L) 02/11/2022    HGB 13.1 02/11/2022    HCT 38.7 02/11/2022    MCV 92.4 02/11/2022    MCH 31.3 (H) 02/11/2022    MCHC 33.9 02/11/2022    RDW 14.4 02/11/2022     02/11/2022    MPV 9.3 02/11/2022    BANDSPCT 31 (H) 06/13/2020    SEGSPCT 47.8 02/11/2022    EOSRELPCT 10.6 (H) 02/11/2022    BASOPCT 1.3 (H) 02/11/2022    LYMPHOPCT 28.6 02/11/2022    MONOPCT 11.7 (H) 02/11/2022    BANDABS 10.20 06/13/2020    SEGSABS 3.0 02/11/2022    EOSABS 0.7 02/11/2022    BASOSABS 0.1 02/11/2022    LYMPHSABS 1.8 02/11/2022    MONOSABS 0.7 02/11/2022    DIFFTYPE AUTOMATED DIFFERENTIAL 02/11/2022    ANISOCYTOSIS 1+ 06/13/2020    POLYCHROM 1+ 06/13/2020    WBCMORP OCCASIONAL 06/10/2020    PLTM FEW 06/13/2020     No results found for: ESR  Chemistry:  Lab Results   Component Value Date     02/11/2022    K 4.2 02/11/2022     02/11/2022    CO2 26 02/11/2022    BUN 14 02/11/2022    CREATININE 0.8 02/11/2022    GLUCOSE 92 02/11/2022    CALCIUM 9.6 02/11/2022    PROT 6.6 02/11/2022    LABALBU 4.0 02/11/2022    BILITOT 1.1 (H) 02/11/2022    ALKPHOS 76 02/11/2022    AST 45 (H) 02/11/2022    ALT 53 (H) 02/11/2022    LABGLOM >60 02/11/2022    GFRAA >60 02/11/2022    AGRATIO 1.9 01/29/2020    GLOB 2.3 01/29/2020    MG 1.6 (L) 06/12/2020     No results found for: MMA, LDH, HOMOCYSTEINE  No components found for: LD  Lab Results   Component Value Date    T4FREE 1.0 02/07/2019     Immunology:  Lab Results   Component Value Date    PROT 6.6 02/11/2022     No results found for: MESSI PereaR  No results found for: B2M  Coagulation Panel:  No results found for: PROTIME, INR, APTT, DDIMER  Anemia Panel:  No results found for: FOBIILBP26, FOLATE  Tumor Markers:  Lab Results   Component Value Date    LABCA2 25.5 02/11/2022     Observations:  PHQ-9 Total Score: 0 (8/12/2022  9:59 AM) Assessment & Plan:   Clinical stage IIA left breast, high grade, triple negative, invasive ductal carcinoma    PLAN  Ms. Tin Nichole is a 50-year-old very pleasant female who was incidentally found to have left breast mass on Echocardiogram done on February 11, 2020. Further work ups with diagnostic digital mammogram and targeted sonogram of left breast revealed that she has about 3.3 cm mass in her left breast upper outer quadrant. She subsequently had ultrasound guided biopsy of the left breast mass on February 25, 2020 and final pathology revealed clinical stage IIA high grade, triple negative, left breast invasive ductal carcinoma. She was seen by Dr. Vaishnavi Philippe and she underwent left breast lumpectomy and sentinel lymph node biopsy on March 20, 2020. Final pathology showed stage IIA INVASIVE DUCTAL CARCINOMA WITH EXTENSIVE NECROSIS, GRADE 3, 2.8 CM. IN GREATEST DIMENSION. SURGICAL MARGINS ARE POSITIVE FOR MALIGNANCY. Ductal Carcinoma In Situ and lobular Carcinoma In Situ are not present. One sentinel lymph node examined was negative for metastasis. No lymphovascular or dermal lymphovascular invasion. Pathologic Stage Classification: pT2, N0, Mx. Repeat surgery for positive margins was done on March 30, 2020 and there was no residual cancer seen. Since she is found to have stage IIA left breast triple negative invasive ductal carcinoma, I recommend for adjuvant chemotherapy and radiation therapy. She had Echocardiogram on 2/2020 and her EF was 51%. Dose dense chemotherapy with doxorubicin/cyclophosphamide, followed by Paclitaxel was started on May 4, 2020 and she completed it on 10/19/2020. She received adjuvant radiation therapy between November 30, 2020 and December 29, 2020. DEXA scan done on 7/30/2020 showed osteopenia by WHO criteria. Mammogram done on 3/23/2021 showed no mammographic evidence of malignancy.   Expected posttreatment changes of the left breast.  Normal follow-up is recommended in 12 months. Diagnostic mammogram and targeted ultrasound done on 9/9/2021 showed interval increase in focal asymmetry in the central left breast at the lumpectomy site correlating with a 1.6 x 1.9 x 2.5 cm spiculated, hypoechoic lesion seen on ultrasound. She subsequently underwent ultrasound-guided core needle biopsy on 9/14/2021 and final pathology showed hyaline fibrosis and a cavity with proteinaceous material.    On August 12, 2022, she presented to me for follow-up. I have been following her for stage IIA left breast triple negative invasive ductal carcinoma and she is status post left breast lumpectomy and sentinel lymph node biopsy, adjuvant chemotherapy and adjuvant radiation therapy. She has been under close observation since then. Reviewed with her findings of mammogram and biopsy done on September, 2021. Since there is no evidence of malignancy on pathology, I believe the suspicious area seen on mammogram is due to post treatment changes. I recommend her to have repeat mammogram in September, 2022. I will schedule for it on 9/15/22 and will follow up with the results. I will also request DEXA scan since she is due for one. She does not have any signs or symptoms suggestive for recurrent or metastatic breast cancer on today visit. I will check CBC, CMP and CA 27-29 level today. She has significant neuropathy and it has not been controlled with Cymbalta 60 mg daily before. Since Cymbalta has not been controlled her neuropathy, I recommend her to stop taking it. I started gabapentin 300 mg every night and she stopped taking it since she is concerned about side effects. She is willing to re try cymbalta and I gave new prescription today. I will follow up with her symptom. Her neuropathy has gotten better with massage therapy. I will continue with observation at this moment.     Health maintnance - I recommend her to have age-appropriate cancer screening, exercise, low-fat and low-sodium diet. Otherwise, I will see her again in 6 months. I answered all her questions and concerns for today. I recommend her to follow-up with primary care physician, Dr. Zay Barba on regular basis. Recent imaging and labs were reviewed and discussed with the patient.

## 2022-08-12 NOTE — PROGRESS NOTES
Patient arrived to treatment suite for port draw. Right chest mediport accessed and blood drawn from site and sent to lab for processing. Port flushed and heparinized. De-accessed and band-aid applied. Patient tolerated well. Left treatment suite ambulatory.

## 2022-08-15 LAB — CA 27.29: 24.6 U/ML

## 2022-09-16 DIAGNOSIS — C50.412 MALIGNANT NEOPLASM OF UPPER-OUTER QUADRANT OF LEFT BREAST IN FEMALE, ESTROGEN RECEPTOR NEGATIVE (HCC): Primary | ICD-10-CM

## 2022-09-16 DIAGNOSIS — Z17.1 MALIGNANT NEOPLASM OF UPPER-OUTER QUADRANT OF LEFT BREAST IN FEMALE, ESTROGEN RECEPTOR NEGATIVE (HCC): Primary | ICD-10-CM

## 2022-09-29 ENCOUNTER — HOSPITAL ENCOUNTER (OUTPATIENT)
Dept: ULTRASOUND IMAGING | Age: 72
End: 2022-09-29
Payer: MEDICARE

## 2022-09-29 ENCOUNTER — HOSPITAL ENCOUNTER (OUTPATIENT)
Dept: WOMENS IMAGING | Age: 72
Discharge: HOME | End: 2022-09-29
Payer: MEDICARE

## 2022-09-29 DIAGNOSIS — M85.89 OSTEOPENIA OF MULTIPLE SITES: ICD-10-CM

## 2022-09-29 DIAGNOSIS — C50.412 MALIGNANT NEOPLASM OF UPPER-OUTER QUADRANT OF LEFT BREAST IN FEMALE, ESTROGEN RECEPTOR NEGATIVE (HCC): ICD-10-CM

## 2022-09-29 DIAGNOSIS — Z17.1 MALIGNANT NEOPLASM OF UPPER-OUTER QUADRANT OF LEFT BREAST IN FEMALE, ESTROGEN RECEPTOR NEGATIVE (HCC): ICD-10-CM

## 2022-09-29 DIAGNOSIS — N95.1 MENOPAUSAL STATE: ICD-10-CM

## 2022-09-29 PROCEDURE — 77080 DXA BONE DENSITY AXIAL: CPT

## 2022-09-29 PROCEDURE — G0279 TOMOSYNTHESIS, MAMMO: HCPCS

## 2022-11-08 ENCOUNTER — OFFICE VISIT (OUTPATIENT)
Dept: SURGERY | Age: 72
End: 2022-11-08
Payer: MEDICARE

## 2022-11-08 VITALS
SYSTOLIC BLOOD PRESSURE: 124 MMHG | OXYGEN SATURATION: 100 % | WEIGHT: 186.3 LBS | DIASTOLIC BLOOD PRESSURE: 70 MMHG | HEART RATE: 56 BPM | BODY MASS INDEX: 31.98 KG/M2

## 2022-11-08 DIAGNOSIS — C50.912 INFILTRATING DUCTAL CARCINOMA OF LEFT BREAST (HCC): Primary | ICD-10-CM

## 2022-11-08 PROCEDURE — 1123F ACP DISCUSS/DSCN MKR DOCD: CPT | Performed by: NURSE PRACTITIONER

## 2022-11-08 PROCEDURE — 3074F SYST BP LT 130 MM HG: CPT | Performed by: NURSE PRACTITIONER

## 2022-11-08 PROCEDURE — 99214 OFFICE O/P EST MOD 30 MIN: CPT | Performed by: NURSE PRACTITIONER

## 2022-11-08 PROCEDURE — 3078F DIAST BP <80 MM HG: CPT | Performed by: NURSE PRACTITIONER

## 2022-11-08 ASSESSMENT — PATIENT HEALTH QUESTIONNAIRE - PHQ9
SUM OF ALL RESPONSES TO PHQ QUESTIONS 1-9: 0
SUM OF ALL RESPONSES TO PHQ QUESTIONS 1-9: 0
2. FEELING DOWN, DEPRESSED OR HOPELESS: 0
SUM OF ALL RESPONSES TO PHQ QUESTIONS 1-9: 0
1. LITTLE INTEREST OR PLEASURE IN DOING THINGS: 0
SUM OF ALL RESPONSES TO PHQ9 QUESTIONS 1 & 2: 0
SUM OF ALL RESPONSES TO PHQ QUESTIONS 1-9: 0

## 2022-11-08 NOTE — PROGRESS NOTES
General Surgery Progress Note    Anju Urias is a 66 y.o. woman who initially presented with left breast cancer. She has no new complaints today. Left Breast History  Breast cancer: yes   Type:    Size:2.8 cm   Receptor Status: triple negative   Node Status: 0/1   Diagnosis Date:   Surgery, Date: lumpectomy with sentinel node bx, subsequent reexcision with negative margins 3/20/20   Chemotherapy: completed   Radiation Therapy: completed   Hormone Therapy: none   Oncotype/Genetic Testing: none  Most Recent Imaging:    Mammogram, Date: 9/9/21  Abnormal Imaging: yes - suspicious area left breast, bx on 9/14/21 showed hylan fibrosis without evidence of malignancy. Likely postsurgical changes    Ldex: 0.6     Exam:  Physical exam has been reviewed and updated  General: no acute distress  Breast:  The patient was examined in the upright and supine position. There is a well healed scar on the left breast. There are no new masses or changes in breast.  No nipple discharge or inversion. Her contralateral breast shows no new masses or changes in breast contour. There was no nipple inversion or discharge. There is no axillary lymphadenopathy palpated bilaterally. Respiratory: respirations are non-labored and there is no audible distress  Cardiovascular: regular rate, extremities appear well perfused  Neurologic: alert, oriented     Mammography:   Last mammogram: 9/2022  Personally reviewed by me. Stable benign mammogram.       BIRADS:   BIRADS - CATEGORY 2       Benign Findings. Normal interval follow-up is recommended in 12 months. OVERALL ASSESSMENT - BENIGN     DEXA:  Last scan: 9/2022  Osteopenia      Assessment:  Anju Urias is a 66 y.o. woman who is now 2 years status-post left partial mastectomy and sentinel lymph node biopsy for invasive ductal carcinoma. Plan:    Will need a mammogram in 9/23  Recommend clinical breast exams in 6 month(s)  DEXA due 7/24  Recommend an active lifestyle, healthy diet, limited alcohol intake, achieve and maintain a healthy BMI to optimize breast cancer outcomes / decrease risk of breast cancer. I reassured her that based on her clinical examination and the imaging studies today there is no evidence of any suspicious findings at this time. I encouraged her to continue monthly self breast examinations and to alert me of any changes. All of the patient's questions were answered at this time however, she was encouraged to call the office with any further inquiries. Approximately 30 minutes of time were spent in preparation, direct patient contact, care coordination, documentation and activities otherwise related to this encounter.      Nikko Lin, APRN - CNP, APRN-CNP

## 2022-11-09 ENCOUNTER — TELEMEDICINE (OUTPATIENT)
Dept: FAMILY MEDICINE CLINIC | Age: 72
End: 2022-11-09
Payer: MEDICARE

## 2022-11-09 DIAGNOSIS — Z00.00 MEDICARE ANNUAL WELLNESS VISIT, SUBSEQUENT: Primary | ICD-10-CM

## 2022-11-09 PROCEDURE — G0439 PPPS, SUBSEQ VISIT: HCPCS | Performed by: FAMILY MEDICINE

## 2022-11-09 PROCEDURE — 1123F ACP DISCUSS/DSCN MKR DOCD: CPT | Performed by: FAMILY MEDICINE

## 2022-11-09 SDOH — ECONOMIC STABILITY: FOOD INSECURITY: WITHIN THE PAST 12 MONTHS, YOU WORRIED THAT YOUR FOOD WOULD RUN OUT BEFORE YOU GOT MONEY TO BUY MORE.: NEVER TRUE

## 2022-11-09 SDOH — ECONOMIC STABILITY: FOOD INSECURITY: WITHIN THE PAST 12 MONTHS, THE FOOD YOU BOUGHT JUST DIDN'T LAST AND YOU DIDN'T HAVE MONEY TO GET MORE.: NEVER TRUE

## 2022-11-09 ASSESSMENT — PATIENT HEALTH QUESTIONNAIRE - PHQ9
SUM OF ALL RESPONSES TO PHQ QUESTIONS 1-9: 0
2. FEELING DOWN, DEPRESSED OR HOPELESS: 0
SUM OF ALL RESPONSES TO PHQ QUESTIONS 1-9: 0
SUM OF ALL RESPONSES TO PHQ9 QUESTIONS 1 & 2: 0
SUM OF ALL RESPONSES TO PHQ QUESTIONS 1-9: 0
1. LITTLE INTEREST OR PLEASURE IN DOING THINGS: 0
SUM OF ALL RESPONSES TO PHQ QUESTIONS 1-9: 0

## 2022-11-09 ASSESSMENT — SOCIAL DETERMINANTS OF HEALTH (SDOH): HOW HARD IS IT FOR YOU TO PAY FOR THE VERY BASICS LIKE FOOD, HOUSING, MEDICAL CARE, AND HEATING?: NOT HARD AT ALL

## 2022-11-09 ASSESSMENT — LIFESTYLE VARIABLES
HOW MANY STANDARD DRINKS CONTAINING ALCOHOL DO YOU HAVE ON A TYPICAL DAY: PATIENT DOES NOT DRINK
HOW OFTEN DO YOU HAVE A DRINK CONTAINING ALCOHOL: NEVER

## 2022-11-09 NOTE — PATIENT INSTRUCTIONS
Personalized Preventive Plan for Cash Leung - 11/9/2022  Medicare offers a range of preventive health benefits. Some of the tests and screenings are paid in full while other may be subject to a deductible, co-insurance, and/or copay. Some of these benefits include a comprehensive review of your medical history including lifestyle, illnesses that may run in your family, and various assessments and screenings as appropriate. After reviewing your medical record and screening and assessments performed today your provider may have ordered immunizations, labs, imaging, and/or referrals for you. A list of these orders (if applicable) as well as your Preventive Care list are included within your After Visit Summary for your review. Other Preventive Recommendations:    A preventive eye exam performed by an eye specialist is recommended every 1-2 years to screen for glaucoma; cataracts, macular degeneration, and other eye disorders. A preventive dental visit is recommended every 6 months. Try to get at least 150 minutes of exercise per week or 10,000 steps per day on a pedometer . Order or download the FREE \"Exercise & Physical Activity: Your Everyday Guide\" from [employer]. Call 6-444.170.7544 or search [employer] online. You need 9270-7002 mg of calcium and 1842-1172 IU of vitamin D per day. It is possible to meet your calcium requirement with diet alone, but a vitamin D supplement is usually necessary to meet this goal.  When exposed to the sun, use a sunscreen that protects against both UVA and UVB radiation with an SPF of 30 or greater. Reapply every 2 to 3 hours or after sweating, drying off with a towel, or swimming. Always wear a seat belt when traveling in a car. Always wear a helmet when riding a bicycle or motorcycle.

## 2022-11-09 NOTE — PROGRESS NOTES
Medicare Annual Wellness Visit    Peter Escobar is here for Medicare AWV    Assessment & Plan   Medicare annual wellness visit, subsequent    Recommendations for Preventive Services Due: see orders and patient instructions/AVS.  Recommended screening schedule for the next 5-10 years is provided to the patient in written form: see Patient Instructions/AVS.     No follow-ups on file. Subjective       Patient's complete Health Risk Assessment and screening values have been reviewed and are found in Flowsheets. The following problems were reviewed today and where indicated follow up appointments were made and/or referrals ordered. Positive Risk Factor Screenings with Interventions:             General Health and ACP:  General  In general, how would you say your health is?: Good  In the past 7 days, have you experienced any of the following: New or Increased Pain, New or Increased Fatigue, Loneliness, Social Isolation, Stress or Anger?: No  Do you get the social and emotional support that you need?: Yes  Do you have a Living Will?: (!) No    Advance Directives       Power of  Living Will ACP-Advance Directive ACP-Power of     Not on File Not on File Not on File Not on File          General Health Risk Interventions:  No Living Will: Advance Care Planning addressed with patient today    Health Habits/Nutrition:  Physical Activity: Inactive    Days of Exercise per Week: 0 days    Minutes of Exercise per Session: 0 min     Have you lost any weight without trying in the past 3 months?: No     Have you seen the dentist within the past year?: (!) No  Health Habits/Nutrition Interventions:  Inadequate physical activity:  Discussed with patient that she may want to check into [employer] [facility] since they have so many activities she could get involved with. Patient states she will look into it.   Nutritional issues:  patient is not ready to address his/her nutritional/weight issues at this time  Dental exam overdue:  patient encouraged to make appointment with his/her dentist    Hearing/Vision:  Do you or your family notice any trouble with your hearing that hasn't been managed with hearing aids?: (!) Yes  Do you have difficulty driving, watching TV, or doing any of your daily activities because of your eyesight?: No  Have you had an eye exam within the past year?: (!) No  No results found. Hearing/Vision Interventions:  Hearing concerns:  patient declines any further evaluation/treatment for hearing issues  Vision concerns:  patient encouraged to make appointment with his/her eye specialist            Objective      Patient-Reported Vitals  No data recorded        Unable to obtain 3 vital signs due to patient not having equipment to take blood pressure/temperature. Allergies   Allergen Reactions    Codeine Hives and Swelling     \"tylenol with codeine is what I had trouble with\"      Prior to Visit Medications    Medication Sig Taking? Authorizing Provider   Calcium Carb-Cholecalciferol (CALTRATE 600+D) 600-800 MG-UNIT TABS 1 tab twice per day Yes Phuong Agudelo MD   atenolol (TENORMIN) 100 MG tablet Take 1 tablet by mouth daily Yes Phuong Agudelo MD   aspirin EC 81 MG EC tablet Take 1 tablet by mouth daily Yes Phuong Agudelo MD   atorvastatin (LIPITOR) 40 MG tablet Take 1 tablet by mouth daily To replace the 10 mg Yes Phuong Agudelo MD   Omega-3 Fatty Acids (FISH OIL PO) Take 1 tablet by mouth daily Yes Historical Provider, MD   Probiotic Product (ALIGN PO) Take 1 tablet by mouth daily Yes Historical Provider, MD   vitamin B-12 (CYANOCOBALAMIN) 1000 MCG tablet Take 1,000 mcg by mouth daily Yes Historical Provider, MD   ketoconazole (NIZORAL) 2 % shampoo  Yes HIGINIO Curry   losartan (COZAAR) 50 MG tablet Take 1 tablet by mouth daily Yes Stephanie Carney MD   Multiple Vitamin (MULTIVITAMIN PO) Take  by mouth daily.    Yes Historical Provider, MD   DULoxetine (CYMBALTA) 60 MG extended release capsule Take 1 capsule by mouth in the morning. Patient not taking: No sig reported  Marjorie Martinez MD   nitroGLYCERIN (NITROSTAT) 0.4 MG SL tablet Place 1 tablet under the tongue every 5 minutes as needed for Chest pain up to max of 3 total doses. If no relief after 1 dose, call 911. Patient not taking: Reported on 11/9/2022  Wing Marcin MD   clobetasol (OLUX) 0.05 % foam   Mohamud Bautista       [facility] (Including outside providers/suppliers regularly involved in providing care):   Patient Care Team:  Wing Marcin MD as PCP - Jamie Webb MD as PCP - [facility] Provider  Dr Adolfo Ba (Cardiology)  Lucy Jovel MD (Internal Medicine)  Major Villa MD as Consulting Physician (Cardiology)  Mohamud Bautista as Consulting Physician (Dermatology)  Karel Spain RN as Nurse Navigator (Oncology)  Marjorie Martinez MD as Consulting Physician (Hematology and Oncology)     Reviewed and updated this visit:  Allergies  Meds               I, Hollie Christopher LPN, 22/6/1655, performed the documented evaluation under the direct supervision of the attending physician. Kenroy Cuevas, was evaluated through a synchronous (real-time) audio encounter. The patient (or guardian if applicable) is aware that this is a billable service, which includes applicable co-pays. This Virtual Visit was conducted with patient's (and/or legal guardian's) consent. The visit was conducted pursuant to the emergency declaration under the [facility], [facility] authority and the [employer] General Act. Patient identification was verified, and a caregiver was present when appropriate. The patient was located at Home: [address] [place] [address]. Provider was located at [facility] (Appt Dept): [facility],  [address] [facility].         Total time spent for this encounter: Not billed by time    --Hollie Christopher LPN on 54/6/5764 at 6:52 AM    An electronic signature was used to authenticate this note.

## 2022-11-11 ENCOUNTER — HOSPITAL ENCOUNTER (OUTPATIENT)
Dept: INFUSION THERAPY | Age: 72
Discharge: HOME | End: 2022-11-11
Payer: MEDICARE

## 2022-11-11 DIAGNOSIS — Z17.1 MALIGNANT NEOPLASM OF UPPER-OUTER QUADRANT OF LEFT BREAST IN FEMALE, ESTROGEN RECEPTOR NEGATIVE (HCC): Primary | ICD-10-CM

## 2022-11-11 DIAGNOSIS — C50.412 MALIGNANT NEOPLASM OF UPPER-OUTER QUADRANT OF LEFT BREAST IN FEMALE, ESTROGEN RECEPTOR NEGATIVE (HCC): Primary | ICD-10-CM

## 2022-11-11 PROCEDURE — 2580000003 HC RX 258: Performed by: INTERNAL MEDICINE

## 2022-11-11 PROCEDURE — 96523 IRRIG DRUG DELIVERY DEVICE: CPT

## 2022-11-11 PROCEDURE — 6360000002 HC RX W HCPCS: Performed by: INTERNAL MEDICINE

## 2022-11-11 RX ORDER — SODIUM CHLORIDE 0.9 % (FLUSH) 0.9 %
20 SYRINGE (ML) INJECTION PRN
Status: DISCONTINUED | OUTPATIENT
Start: 2022-11-11 | End: 2022-11-12 | Stop reason: HOSPADM

## 2022-11-11 RX ORDER — HEPARIN SODIUM (PORCINE) LOCK FLUSH IV SOLN 100 UNIT/ML 100 UNIT/ML
500 SOLUTION INTRAVENOUS PRN
Status: DISCONTINUED | OUTPATIENT
Start: 2022-11-11 | End: 2022-11-12 | Stop reason: HOSPADM

## 2022-11-11 RX ADMIN — SODIUM CHLORIDE, PRESERVATIVE FREE 20 ML: 5 INJECTION INTRAVENOUS at 15:20

## 2022-11-11 RX ADMIN — HEPARIN 500 UNITS: 100 SYRINGE at 15:20

## 2022-11-11 NOTE — PROGRESS NOTES
Pt reported today for port flush and lab draw. Has a Mediport on Right  side chest and was accessed with a 20 G  1 in  Gripper Plus Non-Coring safety needle and was accessed on 1st attempt, blood return was noted, . Port was flushed with 20 cc's (0.9% Sodium Chloride Injection USP) and locked with 500 units of heparin. Pt tolerated procedure well. Mediport is Deep on the Rt mid axillary line. Use (1 in) needle to access. (0.75 in ) needle may not be long enough to access.

## 2022-12-22 DIAGNOSIS — I10 ESSENTIAL HYPERTENSION: ICD-10-CM

## 2022-12-22 RX ORDER — ATENOLOL 100 MG/1
TABLET ORAL
Qty: 90 TABLET | Refills: 0 | OUTPATIENT
Start: 2022-12-22

## 2022-12-26 DIAGNOSIS — I10 ESSENTIAL HYPERTENSION: ICD-10-CM

## 2022-12-28 RX ORDER — ATENOLOL 100 MG/1
TABLET ORAL
Qty: 90 TABLET | Refills: 0 | OUTPATIENT
Start: 2022-12-28

## 2023-01-05 ENCOUNTER — OFFICE VISIT (OUTPATIENT)
Dept: FAMILY MEDICINE CLINIC | Age: 73
End: 2023-01-05
Payer: MEDICARE

## 2023-01-05 VITALS
DIASTOLIC BLOOD PRESSURE: 80 MMHG | SYSTOLIC BLOOD PRESSURE: 130 MMHG | OXYGEN SATURATION: 95 % | BODY MASS INDEX: 31.38 KG/M2 | WEIGHT: 182.8 LBS | HEART RATE: 82 BPM

## 2023-01-05 DIAGNOSIS — G62.0 CHEMOTHERAPY-INDUCED NEUROPATHY (HCC): ICD-10-CM

## 2023-01-05 DIAGNOSIS — E78.5 HYPERLIPIDEMIA, UNSPECIFIED HYPERLIPIDEMIA TYPE: ICD-10-CM

## 2023-01-05 DIAGNOSIS — J34.89 RHINORRHEA: ICD-10-CM

## 2023-01-05 DIAGNOSIS — E66.9 OBESITY (BMI 30.0-34.9): ICD-10-CM

## 2023-01-05 DIAGNOSIS — I25.119 ATHEROSCLEROSIS OF NATIVE CORONARY ARTERY OF NATIVE HEART WITH ANGINA PECTORIS (HCC): ICD-10-CM

## 2023-01-05 DIAGNOSIS — I10 ESSENTIAL HYPERTENSION: Primary | ICD-10-CM

## 2023-01-05 DIAGNOSIS — T45.1X5A CHEMOTHERAPY-INDUCED NEUROPATHY (HCC): ICD-10-CM

## 2023-01-05 DIAGNOSIS — Z23 NEED FOR COVID-19 VACCINE: ICD-10-CM

## 2023-01-05 LAB
CHOLESTEROL, TOTAL: 132 MG/DL (ref 0–199)
HDLC SERPL-MCNC: 60 MG/DL (ref 40–60)
LDL CHOLESTEROL CALCULATED: 54 MG/DL
TRIGL SERPL-MCNC: 90 MG/DL (ref 0–150)
VLDLC SERPL CALC-MCNC: 18 MG/DL

## 2023-01-05 PROCEDURE — 99214 OFFICE O/P EST MOD 30 MIN: CPT | Performed by: FAMILY MEDICINE

## 2023-01-05 PROCEDURE — 1123F ACP DISCUSS/DSCN MKR DOCD: CPT | Performed by: FAMILY MEDICINE

## 2023-01-05 PROCEDURE — 3075F SYST BP GE 130 - 139MM HG: CPT | Performed by: FAMILY MEDICINE

## 2023-01-05 PROCEDURE — 3079F DIAST BP 80-89 MM HG: CPT | Performed by: FAMILY MEDICINE

## 2023-01-05 PROCEDURE — 36415 COLL VENOUS BLD VENIPUNCTURE: CPT | Performed by: FAMILY MEDICINE

## 2023-01-05 RX ORDER — AMOXICILLIN AND CLAVULANATE POTASSIUM 875; 125 MG/1; MG/1
1 TABLET, FILM COATED ORAL 2 TIMES DAILY
Qty: 20 TABLET | Refills: 0 | Status: SHIPPED | OUTPATIENT
Start: 2023-01-05 | End: 2023-01-15

## 2023-01-05 ASSESSMENT — ENCOUNTER SYMPTOMS
SHORTNESS OF BREATH: 0
CHEST TIGHTNESS: 0

## 2023-01-05 ASSESSMENT — PATIENT HEALTH QUESTIONNAIRE - PHQ9
SUM OF ALL RESPONSES TO PHQ QUESTIONS 1-9: 0
SUM OF ALL RESPONSES TO PHQ QUESTIONS 1-9: 0
SUM OF ALL RESPONSES TO PHQ9 QUESTIONS 1 & 2: 0
1. LITTLE INTEREST OR PLEASURE IN DOING THINGS: 0
SUM OF ALL RESPONSES TO PHQ QUESTIONS 1-9: 0
SUM OF ALL RESPONSES TO PHQ QUESTIONS 1-9: 0
2. FEELING DOWN, DEPRESSED OR HOPELESS: 0

## 2023-01-05 NOTE — PROGRESS NOTES
Patient ID: María Ibrahim 1950    . Chief Complaint   Patient presents with    Hypertension    Hyperlipidemia    Insomnia    Peripheral Neuropathy     Requesting disability placard         Hypertension  This is a chronic problem. The current episode started more than 1 year ago. The problem is unchanged. The problem is controlled. Pertinent negatives include no palpitations or shortness of breath. Risk factors for coronary artery disease include post-menopausal state and obesity. Past treatments include angiotensin blockers. There are no compliance problems. Hyperlipidemia  This is a chronic problem. The current episode started more than 1 year ago. The problem is controlled. Pertinent negatives include no shortness of breath. Current antihyperlipidemic treatment includes statins. Risk factors for coronary artery disease include post-menopausal, hypertension and obesity. Insomnia  This is a chronic problem. The current episode started more than 1 year ago. The problem has been unchanged. Treatments tried: Trazodone. Coronary Artery Disease  Presents for follow-up visit. Pertinent negatives include no chest tightness, leg swelling, palpitations or shortness of breath. Risk factors include hyperlipidemia. The symptoms have been stable (Sees cardiologist). Neuropathy    It is located in the LLE, LUE, RLE and RUE region. Additional narrative: Due to chemotherapy        Runny nose: x 2 years. Believes it's due to chemo. Chemo nurse told her it was from the chemo and to take Claritin.   This was not verified with her oncologist.  Geneva Sweet helps    Patient Active Problem List   Diagnosis    Essential hypertension    Hyperlipidemia    History of colon polyps    Obesity (BMI 30.0-34.9)    Osteopenia of multiple sites    Coronary artery disease involving native coronary artery of native heart without angina pectoris    Hepatic steatosis    Malignant neoplasm of upper-outer quadrant of left breast in female, estrogen receptor negative ([facility] [address]    Postoperative nausea    Alopecia (capitis) totalis    Diarrhea due to drug    Chemotherapy-induced neuropathy (HCC)    LBBB (left bundle branch block)    Angina pectoris, unspecified    Atherosclerotic heart disease of native coronary artery with unspecified angina pectoris       Past Surgical History:   Procedure Laterality Date    BREAST BIOPSY Left 02/25/2020    BREAST BIOPSY Left 03/20/2020    sentinal node, partial mastectomy left    BREAST LUMPECTOMY Left 3/20/2020    LEFT BREAST LUMPECTOMY WITH NEEDLE LOC AND SENTINEL NODE BIOPSY performed by Xiao Alegria MD at [facility] Left 3/30/2020    BREAST LESION RE EXCISION LEFT LUMPECTOMY SITE performed by Xiao Alegria MD at [facility] TEST  6/2009    treadmill, Dr. Gabriela Flynn Right 1990's    right    [address] [facility].    COLONOSCOPY  2009        COLONOSCOPY  02/27/2015    diverticulosis, internal hemorroids.  repeat 5 years. Leotha Screws    PORT SURGERY Right 03/20/2020    PORT SURGERY Right 3/20/2020    PORT INSERTION performed by Xiao Alegria MD at [address] Right 4/27/2016    TUBAL LIGATION  1980    US BREAST NEEDLE BIOPSY LEFT Left 9/14/2021    US BREAST NEEDLE BIOPSY LEFT 9/14/2021 Alexandra Pinto  [address]       Family History   Problem Relation Age of Onset    High Blood Pressure Mother     Stroke Father     Coronary Art Dis Father     High Blood Pressure Father     No Known Problems Sister     Heart Attack Brother 72    Breast Cancer Neg Hx     Ovarian Cancer Neg Hx        Current Outpatient Medications on File Prior to Visit   Medication Sig Dispense Refill    Calcium Carb-Cholecalciferol (CALTRATE 600+D) 600-800 MG-UNIT TABS 1 tab twice per day 180 tablet 3    atenolol (TENORMIN) 100 MG tablet Take 1 tablet by mouth daily 90 tablet 1    aspirin EC 81 MG EC tablet Take 1 tablet by mouth daily 90 tablet 3    atorvastatin (LIPITOR) 40 MG tablet Take 1 tablet by mouth daily To replace the 10 mg 90 tablet 3    Omega-3 Fatty Acids (FISH OIL PO) Take 1 tablet by mouth daily      Probiotic Product (ALIGN PO) Take 1 tablet by mouth daily      vitamin B-12 (CYANOCOBALAMIN) 1000 MCG tablet Take 1,000 mcg by mouth daily      ketoconazole (NIZORAL) 2 % shampoo       losartan (COZAAR) 50 MG tablet Take 1 tablet by mouth daily      Multiple Vitamin (MULTIVITAMIN PO) Take  by mouth daily. DULoxetine (CYMBALTA) 60 MG extended release capsule Take 1 capsule by mouth in the morning. (Patient not taking: No sig reported) 30 capsule 3    nitroGLYCERIN (NITROSTAT) 0.4 MG SL tablet Place 1 tablet under the tongue every 5 minutes as needed for Chest pain up to max of 3 total doses. If no relief after 1 dose, call 911. (Patient not taking: No sig reported) 25 tablet 1    clobetasol (OLUX) 0.05 % foam  (Patient not taking: No sig reported)       No current facility-administered medications on file prior to visit. Objective:         Physical Exam  Vitals and nursing note reviewed. Constitutional:       General: She is not in acute distress. Appearance: She is well-developed. HENT:      Head: Normocephalic and atraumatic. Right Ear: Hearing, tympanic membrane and external ear normal.      Left Ear: Hearing, tympanic membrane and external ear normal.      Nose: Nose normal. No nasal deformity, laceration, mucosal edema or rhinorrhea. Right Sinus: No maxillary sinus tenderness or frontal sinus tenderness. Left Sinus: No maxillary sinus tenderness or frontal sinus tenderness. Mouth/Throat:      Mouth: Mucous membranes are moist.      Pharynx: No oropharyngeal exudate or posterior oropharyngeal erythema. Eyes:      Conjunctiva/sclera: Conjunctivae normal.   Neck:      Thyroid: No thyromegaly. Trachea: No tracheal deviation.    Cardiovascular:      Rate and Rhythm: Normal rate and regular rhythm. Heart sounds: Normal heart sounds, S1 normal and S2 normal.     No friction rub. No gallop. Pulmonary:      Effort: No respiratory distress. Breath sounds: No wheezing or rales. Musculoskeletal:      Right lower leg: No edema. Left lower leg: No edema. Lymphadenopathy:      Head:      Right side of head: No submental, submandibular or posterior auricular adenopathy. Left side of head: No submental, submandibular or posterior auricular adenopathy. Cervical:      Right cervical: No superficial, deep or posterior cervical adenopathy. Left cervical: No superficial, deep or posterior cervical adenopathy. Skin:     General: Skin is warm and dry. Psychiatric:         Behavior: Behavior normal.     Vitals:    01/05/23 0856   BP: 130/80   Site: Left Upper Arm   Position: Sitting   Cuff Size: Medium Adult   Pulse: 82   SpO2: 95%   Weight: 182 lb 12.8 oz (82.9 kg)     Body mass index is 31.38 kg/m². Wt Readings from Last 3 Encounters:   01/05/23 182 lb 12.8 oz (82.9 kg)   11/08/22 186 lb 4.8 oz (84.5 kg)   08/12/22 182 lb (82.6 kg)     BP Readings from Last 3 Encounters:   01/05/23 130/80   11/08/22 124/70   08/12/22 (!) 151/66          No results found for this visit on 01/05/23. The 10-year ASCVD risk score (Norberto YAN, et al., 2019) is: 16.9%    Values used to calculate the score:      Age: 67 years      Sex: Female      Is Non- : No      Diabetic: No      Tobacco smoker: No      Systolic Blood Pressure: 104 mmHg      Is BP treated: Yes      HDL Cholesterol: 72 mg/dL      Total Cholesterol: 166 mg/dL  Lab Review   No visits with results within 2 Month(s) from this visit.    Latest known visit with results is:   Hospital Outpatient Visit on 08/12/2022   Component Date Value    CA 27.29 08/12/2022 24.6     Sodium 08/12/2022 139     Potassium 08/12/2022 4.2     Chloride 08/12/2022 103     CO2 08/12/2022 27     BUN 08/12/2022 14 Creatinine 08/12/2022 0.8     Glucose 08/12/2022 92     Calcium 08/12/2022 9.6     Albumin 08/12/2022 4.4     Total Protein 08/12/2022 6.4     Total Bilirubin 08/12/2022 1.1 (A)     ALT 08/12/2022 41 (A)     AST 08/12/2022 34     Alkaline Phosphatase 08/12/2022 71     GFR Non- 08/12/2022 >60     GFR  08/12/2022 >60     Anion Gap 08/12/2022 9     WBC 08/12/2022 6.4     RBC 08/12/2022 3.97 (A)     Hemoglobin 08/12/2022 12.6     Hematocrit 08/12/2022 37.6     MCV 08/12/2022 94.7     MCH 08/12/2022 31.7 (A)     MCHC 08/12/2022 33.5     RDW 08/12/2022 14.5     Platelets 26/70/3994 254     MPV 08/12/2022 9.3     Differential Type 08/12/2022 AUTOMATED DIFFERENTIAL     Segs Relative 08/12/2022 55.8     Lymphocytes % 08/12/2022 23.0 (A)     Monocytes % 08/12/2022 11.0 (A)     Eosinophils % 08/12/2022 8.8 (A)     Basophils % 08/12/2022 1.4 (A)     Segs Absolute 08/12/2022 3.5     Lymphocytes Absolute 08/12/2022 1.5     Monocytes Absolute 08/12/2022 0.7     Eosinophils Absolute 08/12/2022 0.6     Basophils Absolute 08/12/2022 0.1            Assessment:       Diagnosis Orders   1. Essential hypertension        2. Chemotherapy-induced neuropathy (HCC)        3. Atherosclerosis of native coronary artery of native heart with angina pectoris ([facility] [address]        4. Hyperlipidemia, unspecified hyperlipidemia type  Lipid Panel      5. Obesity (BMI 30.0-34.9)        6. Rhinorrhea  amoxicillin-clavulanate (AUGMENTIN) 875-125 MG per tablet      7. Need for COVID-19 vaccine                Plan:      Hypertension stable. Continue atenolol. Weight loss recommended. Coronary artery disease stable. Continue with cardiology    Will get disability placard for her peripheral neuropathy due to chemotherapy. I have not heard that chemotherapy can cause chronic rhinorrhea. We will try trial of Augmentin. Return in about 25 weeks (around 6/29/2023) for HTN, Hyperlipid.

## 2023-01-05 NOTE — LETTER
[place] [address]  Phone: [phone]  Fax: [phone]    María Ferraro MD         January 5, 2023     Patient: Martita Garcia   YOB: 1950   Date of Visit: 1/5/2023       To Whom It May Concern: It is my medical opinion that Tristan Germain requires a disability parking placard for the following reasons:  She cannot walk 200 feet without stopping to rest.  Duration of need: 5 years    If you have any questions or concerns, please don't hesitate to call.     Sincerely,        María Ferraro MD Not applicable

## 2023-01-27 ENCOUNTER — TELEPHONE (OUTPATIENT)
Dept: FAMILY MEDICINE CLINIC | Age: 73
End: 2023-01-27

## 2023-01-27 DIAGNOSIS — I10 ESSENTIAL HYPERTENSION: ICD-10-CM

## 2023-01-27 RX ORDER — ATENOLOL 100 MG/1
100 TABLET ORAL DAILY
Qty: 90 TABLET | Refills: 1 | Status: SHIPPED | OUTPATIENT
Start: 2023-01-27

## 2023-02-11 NOTE — PROGRESS NOTES
Patient Name: German Forde  Patient : 1950  Patient MRN: [id number]     Primary Oncologist: Rashid Ross MD  Referring Provider: Ying Puente MD     Date of Service: 2023      Chief Complaint:   Chief Complaint   Patient presents with    Follow-up     Patient Active Problem List:     Malignant neoplasm of female breast      Postoperative nausea     Neutropenic fever      Alopecia (capitis) totalis    HPI:   Ms. Asha Newell is a 50-year-old very pleasant female with medical history significant for hypertension, hyperlipidemia, coronary artery disease, initially referred to me on 2020 for evaluation of his clinical stage IIA left breast, high grade, triple negative, invasive ductal carcinoma. She stated that she has echocardiogram and stress test on 2020. It showed normal EF (EF 51%). Concentric left ventricular hypertrophy and she has mild to moderate mitral regurfitation. She was incidentally noted to have left breast mass. She was subsequently evaluated by Dr. Shekhar Perez and she requested diagnostic mammogram. She underwent diagnostic digital bilateral mammogram on 2020 and it showed dumbbell-shaped mass at 2-3 o'clock at middle to posterior depth with associated pleomorphic calcifications, in her left breast. Targeted ultrasound at 2 o'clock at a distance of 7 cm from the nipple demonstrates a dumbbell-shaped hypoechoic mass with angular margins that measures 3.3 x 2.5 x 2.1 cm. Internal vascularity noted along with some posterior acoustic shadowing. No suspicious axillary lymph nodes. She underwent ultrasound guided core biopsy of left breast and clip marker placement on 2020 and final pathology showed invasive ductal carcinoma. Tumor size is at least 12 mm and it is a high grade tumor. ER by IHC is negative (0%), PgR by IHC is negative (0%), Her2 by IHC is equivocal (score 2+) and Her2 by FISH is negative.      Since she is found to have clinical stage IIA left breast invasive ductal carcinoma, she was subsequently referred to me for further evaluation. She was seen by Dr. Inés Singh and she underwent left breast lumpectomy and sentinel lymph node biopsy on March 20, 2020. Final pathology showed stage IIA INVASIVE DUCTAL CARCINOMA WITH EXTENSIVE NECROSIS, GRADE 3, 2.8 CM. IN GREATEST DIMENSION. SURGICAL MARGINS ARE POSITIVE FOR MALIGNANCY. Ductal Carcinoma In Situ and lobular Carcinoma In Situ are not present. One sentinel lymph node examined was negative for metastasis. No lymphovascular or dermal lymphovascular invasion. Pathologic Stage Classification: pT2, N0, Mx. Repeat surgery for positive margins was done on March 30, 2020 and there was no residual cancer seen. Since she is found to have stage IIA left breast triple negative invasive ductal carcinoma, I recommend for adjuvant chemotherapy and radiation therapy. She had Echocardiogram on 2/2020 and her EF was 51%. Dose dense chemotherapy with doxorubicin/cyclophosphamide, followed by Paclitaxel was started on May 4, 2020 and she completed it on 10/19/2020. She received adjuvant radiation therapy between November 30, 2020 and December 29, 2020. DEXA scan done on 7/30/2020 showed osteopenia by WHO criteria. Mammogram done on 3/23/2021 showed no mammographic evidence of malignancy. Expected posttreatment changes of the left breast.  Normal follow-up is recommended in 12 months. Diagnostic mammogram and targeted ultrasound done on 9/9/2021 showed interval increase in focal asymmetry in the central left breast at the lumpectomy site correlating with a 1.6 x 1.9 x 2.5 cm spiculated, hypoechoic lesion seen on ultrasound. She subsequently underwent ultrasound-guided core needle biopsy on 9/14/2021 and final pathology showed hyaline fibrosis and a cavity with proteinaceous material.    On February 14, 2023, she presented to me for follow-up.   I have been following her for stage IIA left breast triple negative invasive ductal carcinoma and she is status post left breast lumpectomy and sentinel lymph node biopsy, adjuvant chemotherapy and adjuvant radiation therapy. She has been under close observation since then. Reviewed with her findings of mammogram done on 2022. Since there is no evidence of malignancy, I recommend her to have repeat mammogram in 2023. DEXA scan done on 22 showed osteopenia by WHO criteria. Recommend her to continue with calcium, vitamin D and weight bearing exercise. She does not have any signs or symptoms suggestive for recurrent or metastatic breast cancer on today visit. I will check CBC, CMP and CA 27-29 level today. She has significant neuropathy and it has not been controlled with Cymbalta 60 mg daily before. Since Cymbalta has not been controlled her neuropathy, I recommend her to stop taking it. I started gabapentin 300 mg every night and she stopped taking it since she is concerned about side effects. Her neuropathy has gotten better with massage therapy. I will continue with observation at this moment. Health maintnance - I recommend her to have age-appropriate cancer screening, exercise, low-fat and low-sodium diet. Otherwise, I will see her again in 6 months. Besides neuropathy, she does not have any significant symptoms at today visit. Past Medical History  Significant for  1. Hypertension  2. Hyperlipidemia  3. Obesity  4. Coronary artery disease    Surgical History  Significant for  1. Shoulder repair in   2.  section in     Allergies  Tylenol-Codeine    Social History  She denies smoking, alcohol drinking and illicit drug abuse. She is currently living in [facility]. Family History  Father: Hypertension  Mother: Hypertension    Review of Systems: \"Per interval history; otherwise 10 point ROS is negative. \"  Her energy level is pretty good and her sleep is fine.  She doesn't have fever, chills, night sweats, cough, shortness of breath, chest pain, hemoptysis or palpitations. Her bowel and bladder functions are normal. She denies nausea, vomiting, abdominal pain, diarrhea, constipation, dysuria, loss of appetite or weight loss. She denies neuropathy and she doesn't have bleeding or clotting issues. She denies any pain in her body. No anxiety or depression. The rest of the systems are unremarkable. Vital Signs:  BP (!) 172/74 (Site: Left Upper Arm, Position: Sitting, Cuff Size: Large Adult)   Pulse 60   Temp 97.2 °F (36.2 °C) (Temporal)   Ht 5' 4\" (1.626 m)   Wt 183 lb 12.8 oz (83.4 kg)   LMP  (LMP Unknown)   SpO2 96%   BMI 31.55 kg/m²     Physical Exam:  CONSTITUTIONAL: awake, no apparent distress, alert, cooperative,    EYES: pupils equal, round and reactive to light, sclera clear and conjunctiva normal  ENT: Normocephalic, without obvious abnormality, atraumatic  NECK: supple, symmetrical, no jugular venous distension and no carotid bruits   HEMATOLOGIC/LYMPHATIC: no cervical, supraclavicular or axillary lymphadenopathy   LUNGS: VBS, no wheezes, no increased work of breathing, no crackles, clear to auscultation, no rhonchi,   CARDIOVASCULAR: regular rate and rhythm, normal S1 and S2, no murmur noted  ABDOMEN: soft, non-distended, normal bowel sounds x 4, non-tender, no masses palpated, no hepatosplenomegaly   MUSCULOSKELETAL: full range of motion noted, tone is normal  NEUROLOGIC: awake, alert, oriented to name, place and time. Motor skills grossly intact. SKIN: Normal skin color, texture, turgor and no jaundice.  appears intact   EXTREMITIES: no cyanosis, no leg swelling, no clubbing, no LE edema,   BREASTS: Post surgical and radiation changes noted in left breast, no palpable mass in bilateral breasts and axilla     Labs:  Hematology:  Lab Results   Component Value Date    WBC 7.7 02/14/2023    RBC 4.08 (L) 02/14/2023    HGB 12.7 02/14/2023    HCT 37.8 02/14/2023    MCV 92.6 02/14/2023    MCH 31.1 (H) 02/14/2023    MCHC 33.6 02/14/2023    RDW 14.0 02/14/2023     02/14/2023    MPV 9.2 02/14/2023    BANDSPCT 31 (H) 06/13/2020    SEGSPCT 56.2 02/14/2023    EOSRELPCT 10.6 (H) 02/14/2023    BASOPCT 0.9 02/14/2023    LYMPHOPCT 24.1 02/14/2023    MONOPCT 8.2 (H) 02/14/2023    BANDABS 10.20 06/13/2020    SEGSABS 4.3 02/14/2023    EOSABS 0.8 02/14/2023    BASOSABS 0.1 02/14/2023    LYMPHSABS 1.9 02/14/2023    MONOSABS 0.6 02/14/2023    DIFFTYPE AUTOMATED DIFFERENTIAL 02/14/2023    ANISOCYTOSIS 1+ 06/13/2020    POLYCHROM 1+ 06/13/2020    WBCMORP OCCASIONAL 06/10/2020    PLTM FEW 06/13/2020     No results found for: ESR  Chemistry:  Lab Results   Component Value Date     08/12/2022    K 4.2 08/12/2022     08/12/2022    CO2 27 08/12/2022    BUN 14 08/12/2022    CREATININE 0.8 08/12/2022    GLUCOSE 92 08/12/2022    CALCIUM 9.6 08/12/2022    PROT 6.4 08/12/2022    LABALBU 4.4 08/12/2022    BILITOT 1.1 (H) 08/12/2022    ALKPHOS 71 08/12/2022    AST 34 08/12/2022    ALT 41 (H) 08/12/2022    LABGLOM >60 08/12/2022    GFRAA >60 08/12/2022    AGRATIO 1.9 01/29/2020    GLOB 2.3 01/29/2020    MG 1.6 (L) 06/12/2020     No results found for: MMA, LDH, HOMOCYSTEINE  No components found for: LD  Lab Results   Component Value Date    T4FREE 1.0 02/07/2019     Immunology:  Lab Results   Component Value Date    PROT 6.4 08/12/2022     No results found for: Dick Jjca, KLFLCR  No results found for: B2M  Coagulation Panel:  No results found for: PROTIME, INR, APTT, DDIMER  Anemia Panel:  No results found for: [id number], FOLATE  Tumor Markers:  Lab Results   Component Value Date    LABCA2 24.6 08/12/2022     Observations:  PHQ-9 Total Score: 0 (2/14/2023  9:53 AM)       Assessment & Plan:   Clinical stage IIA left breast, high grade, triple negative, invasive ductal carcinoma    PLAN  Ms. Asha Newell is a 67-year-old very pleasant female who was incidentally found to have left breast mass on Echocardiogram done on February 11, 2020. Further work ups with diagnostic digital mammogram and targeted sonogram of left breast revealed that she has about 3.3 cm mass in her left breast upper outer quadrant. She subsequently had ultrasound guided biopsy of the left breast mass on February 25, 2020 and final pathology revealed clinical stage IIA high grade, triple negative, left breast invasive ductal carcinoma. She was seen by Dr. Tal Beebe and she underwent left breast lumpectomy and sentinel lymph node biopsy on March 20, 2020. Final pathology showed stage IIA INVASIVE DUCTAL CARCINOMA WITH EXTENSIVE NECROSIS, GRADE 3, 2.8 CM. IN GREATEST DIMENSION. SURGICAL MARGINS ARE POSITIVE FOR MALIGNANCY. Ductal Carcinoma In Situ and lobular Carcinoma In Situ are not present. One sentinel lymph node examined was negative for metastasis. No lymphovascular or dermal lymphovascular invasion. Pathologic Stage Classification: pT2, N0, Mx. Repeat surgery for positive margins was done on March 30, 2020 and there was no residual cancer seen. Since she is found to have stage IIA left breast triple negative invasive ductal carcinoma, I recommend for adjuvant chemotherapy and radiation therapy. She had Echocardiogram on 2/2020 and her EF was 51%. Dose dense chemotherapy with doxorubicin/cyclophosphamide, followed by Paclitaxel was started on May 4, 2020 and she completed it on 10/19/2020. She received adjuvant radiation therapy between November 30, 2020 and December 29, 2020. DEXA scan done on 7/30/2020 showed osteopenia by WHO criteria. Mammogram done on 3/23/2021 showed no mammographic evidence of malignancy. Expected posttreatment changes of the left breast.  Normal follow-up is recommended in 12 months.     Diagnostic mammogram and targeted ultrasound done on 9/9/2021 showed interval increase in focal asymmetry in the central left breast at the lumpectomy site correlating with a 1.6 x 1.9 x 2.5 cm spiculated, hypoechoic lesion seen on ultrasound. She subsequently underwent ultrasound-guided core needle biopsy on 9/14/2021 and final pathology showed hyaline fibrosis and a cavity with proteinaceous material.    On February 14, 2023, she presented to me for follow-up. I have been following her for stage IIA left breast triple negative invasive ductal carcinoma and she is status post left breast lumpectomy and sentinel lymph node biopsy, adjuvant chemotherapy and adjuvant radiation therapy. She has been under close observation since then. Reviewed with her findings of mammogram done on September 30, 2022. Since there is no evidence of malignancy, I recommend her to have repeat mammogram in 9/2023. DEXA scan done on 9/29/22 showed osteopenia by WHO criteria. Recommend her to continue with calcium, vitamin D and weight bearing exercise. She does not have any signs or symptoms suggestive for recurrent or metastatic breast cancer on today visit. I will check CBC, CMP and CA 27-29 level today. She has significant neuropathy and it has not been controlled with Cymbalta 60 mg daily before. Since Cymbalta has not been controlled her neuropathy, I recommend her to stop taking it. I started gabapentin 300 mg every night and she stopped taking it since she is concerned about side effects. Her neuropathy has gotten better with massage therapy. I will continue with observation at this moment. Health maintnance - I recommend her to have age-appropriate cancer screening, exercise, low-fat and low-sodium diet. Otherwise, I will see her again in 6 months. I answered all her questions and concerns for today. Recent imaging and labs were reviewed and discussed with the patient.

## 2023-02-14 ENCOUNTER — OFFICE VISIT (OUTPATIENT)
Dept: ONCOLOGY | Age: 73
End: 2023-02-14
Payer: MEDICARE

## 2023-02-14 ENCOUNTER — HOSPITAL ENCOUNTER (OUTPATIENT)
Dept: INFUSION THERAPY | Age: 73
Discharge: HOME | End: 2023-02-14
Payer: MEDICARE

## 2023-02-14 ENCOUNTER — HOSPITAL ENCOUNTER (OUTPATIENT)
Dept: RADIATION ONCOLOGY | Age: 73
Discharge: HOME | End: 2023-02-14
Attending: RADIOLOGY
Payer: MEDICARE

## 2023-02-14 VITALS
HEIGHT: 64 IN | TEMPERATURE: 97.2 F | BODY MASS INDEX: 31.38 KG/M2 | HEART RATE: 60 BPM | WEIGHT: 183.8 LBS | OXYGEN SATURATION: 96 % | DIASTOLIC BLOOD PRESSURE: 74 MMHG | SYSTOLIC BLOOD PRESSURE: 172 MMHG

## 2023-02-14 VITALS
BODY MASS INDEX: 31.38 KG/M2 | HEART RATE: 60 BPM | OXYGEN SATURATION: 96 % | TEMPERATURE: 97.2 F | SYSTOLIC BLOOD PRESSURE: 172 MMHG | WEIGHT: 183.8 LBS | DIASTOLIC BLOOD PRESSURE: 74 MMHG | HEIGHT: 64 IN

## 2023-02-14 DIAGNOSIS — C50.412 MALIGNANT NEOPLASM OF UPPER-OUTER QUADRANT OF LEFT BREAST IN FEMALE, ESTROGEN RECEPTOR NEGATIVE (HCC): Primary | ICD-10-CM

## 2023-02-14 DIAGNOSIS — Z17.1 MALIGNANT NEOPLASM OF UPPER-OUTER QUADRANT OF LEFT BREAST IN FEMALE, ESTROGEN RECEPTOR NEGATIVE (HCC): Primary | ICD-10-CM

## 2023-02-14 LAB
ALBUMIN SERPL-MCNC: 4.2 GM/DL (ref 3.4–5)
ALP BLD-CCNC: 79 IU/L (ref 40–129)
ALT SERPL-CCNC: 36 U/L (ref 10–40)
ANION GAP SERPL CALCULATED.3IONS-SCNC: 13 MMOL/L (ref 4–16)
AST SERPL-CCNC: 36 IU/L (ref 15–37)
BASOPHILS ABSOLUTE: 0.1 K/CU MM
BASOPHILS RELATIVE PERCENT: 0.9 % (ref 0–1)
BILIRUB SERPL-MCNC: 1.1 MG/DL (ref 0–1)
BUN SERPL-MCNC: 19 MG/DL (ref 6–23)
CALCIUM SERPL-MCNC: 9.7 MG/DL (ref 8.3–10.6)
CHLORIDE BLD-SCNC: 102 MMOL/L (ref 99–110)
CO2: 24 MMOL/L (ref 21–32)
CREAT SERPL-MCNC: 0.7 MG/DL (ref 0.6–1.1)
DIFFERENTIAL TYPE: ABNORMAL
EOSINOPHILS ABSOLUTE: 0.8 K/CU MM
EOSINOPHILS RELATIVE PERCENT: 10.6 % (ref 0–3)
GFR SERPL CREATININE-BSD FRML MDRD: >60 ML/MIN/1.73M2
GLUCOSE SERPL-MCNC: 134 MG/DL (ref 70–99)
HCT VFR BLD CALC: 37.8 % (ref 37–47)
HEMOGLOBIN: 12.7 GM/DL (ref 12.5–16)
LYMPHOCYTES ABSOLUTE: 1.9 K/CU MM
LYMPHOCYTES RELATIVE PERCENT: 24.1 % (ref 24–44)
MCH RBC QN AUTO: 31.1 PG (ref 27–31)
MCHC RBC AUTO-ENTMCNC: 33.6 % (ref 32–36)
MCV RBC AUTO: 92.6 FL (ref 78–100)
MONOCYTES ABSOLUTE: 0.6 K/CU MM
MONOCYTES RELATIVE PERCENT: 8.2 % (ref 0–4)
PDW BLD-RTO: 14 % (ref 11.7–14.9)
PLATELET # BLD: 275 K/CU MM (ref 140–440)
PMV BLD AUTO: 9.2 FL (ref 7.5–11.1)
POTASSIUM SERPL-SCNC: 4.1 MMOL/L (ref 3.5–5.1)
RBC # BLD: 4.08 M/CU MM (ref 4.2–5.4)
SEGMENTED NEUTROPHILS ABSOLUTE COUNT: 4.3 K/CU MM
SEGMENTED NEUTROPHILS RELATIVE PERCENT: 56.2 % (ref 36–66)
SODIUM BLD-SCNC: 139 MMOL/L (ref 135–145)
TOTAL PROTEIN: 6.6 GM/DL (ref 6.4–8.2)
WBC # BLD: 7.7 K/CU MM (ref 4–10.5)

## 2023-02-14 PROCEDURE — 99213 OFFICE O/P EST LOW 20 MIN: CPT | Performed by: RADIOLOGY

## 2023-02-14 PROCEDURE — 86300 IMMUNOASSAY TUMOR CA 15-3: CPT

## 2023-02-14 PROCEDURE — 2580000003 HC RX 258: Performed by: INTERNAL MEDICINE

## 2023-02-14 PROCEDURE — 6360000002 HC RX W HCPCS

## 2023-02-14 PROCEDURE — 85025 COMPLETE CBC W/AUTO DIFF WBC: CPT

## 2023-02-14 PROCEDURE — 36591 DRAW BLOOD OFF VENOUS DEVICE: CPT

## 2023-02-14 PROCEDURE — 80053 COMPREHEN METABOLIC PANEL: CPT

## 2023-02-14 PROCEDURE — 1123F ACP DISCUSS/DSCN MKR DOCD: CPT | Performed by: INTERNAL MEDICINE

## 2023-02-14 PROCEDURE — 3077F SYST BP >= 140 MM HG: CPT | Performed by: INTERNAL MEDICINE

## 2023-02-14 PROCEDURE — 99211 OFF/OP EST MAY X REQ PHY/QHP: CPT

## 2023-02-14 PROCEDURE — 99213 OFFICE O/P EST LOW 20 MIN: CPT | Performed by: INTERNAL MEDICINE

## 2023-02-14 PROCEDURE — 3078F DIAST BP <80 MM HG: CPT | Performed by: INTERNAL MEDICINE

## 2023-02-14 RX ORDER — HEPARIN SODIUM (PORCINE) LOCK FLUSH IV SOLN 100 UNIT/ML 100 UNIT/ML
SOLUTION INTRAVENOUS
Status: COMPLETED
Start: 2023-02-14 | End: 2023-02-14

## 2023-02-14 RX ORDER — SODIUM CHLORIDE 0.9 % (FLUSH) 0.9 %
20 SYRINGE (ML) INJECTION PRN
Status: DISCONTINUED | OUTPATIENT
Start: 2023-02-14 | End: 2023-02-15 | Stop reason: HOSPADM

## 2023-02-14 RX ORDER — HEPARIN SODIUM (PORCINE) LOCK FLUSH IV SOLN 100 UNIT/ML 100 UNIT/ML
500 SOLUTION INTRAVENOUS PRN
Status: DISCONTINUED | OUTPATIENT
Start: 2023-02-14 | End: 2023-02-15 | Stop reason: HOSPADM

## 2023-02-14 RX ADMIN — HEPARIN 500 UNITS: 100 SYRINGE at 11:19

## 2023-02-14 RX ADMIN — SODIUM CHLORIDE, PRESERVATIVE FREE 20 ML: 5 INJECTION INTRAVENOUS at 11:18

## 2023-02-14 RX ADMIN — HEPARIN SODIUM (PORCINE) LOCK FLUSH IV SOLN 100 UNIT/ML 500 UNITS: 100 SOLUTION at 11:19

## 2023-02-14 ASSESSMENT — PATIENT HEALTH QUESTIONNAIRE - PHQ9
SUM OF ALL RESPONSES TO PHQ QUESTIONS 1-9: 0
1. LITTLE INTEREST OR PLEASURE IN DOING THINGS: 0
SUM OF ALL RESPONSES TO PHQ QUESTIONS 1-9: 0
2. FEELING DOWN, DEPRESSED OR HOPELESS: 0
SUM OF ALL RESPONSES TO PHQ9 QUESTIONS 1 & 2: 0

## 2023-02-14 NOTE — PROGRESS NOTES
MA Rooming Questions  Patient: Gabi Verma  MRN: [id number]    Date: 2/14/2023        1. Do you have any new issues?   no         2. Do you need any refills on medications?    no    3. Have you had any imaging done since your last visit? yes - latha and dexa scan 9/29    4. Have you been hospitalized or seen in the emergency room since your last visit here?   no    5. Did the patient have a depression screening completed today?  Yes    PHQ-9 Total Score: 0 (2/14/2023  9:53 AM)       PHQ-9 Given to (if applicable):               PHQ-9 Score (if applicable):                     [] Positive     []  Negative              Does question #9 need addressed (if applicable)                     [] Yes    []  No               Ignacio Banegas CMA

## 2023-02-14 NOTE — PROGRESS NOTES
[address] [id number] [facility], [address] [facility]  Phone: [phone]  Fax: [phone]    RADIATION ONCOLOGY FOLLOW UP REPORT    PATIENT NAME:  James Cano              : 1950  MEDICAL RECORD NO: [id number]    [facility] NO: [id number]        PROVIDER: Gricelda Galeana MD      DATE OF SERVICE: 2023     FOLLOW UP PHYSICIANS:  Primary Care: MD Gil Paul M.D. Corinn Marking, M.D. DIAGNOSIS: Cancer Staging  Malignant neoplasm of upper-outer quadrant of left breast in female, estrogen receptor negative ([facility] [address]  Staging form: Breast, AJCC 8th Edition  - Pathologic stage from 3/9/2020: Stage IIA (pT2, pN0, cM0, G3, ER-, ID-, HER2-) - Signed by Gil Driscoll MD on 2020         TREATMENT COURSE:   Oncology History   Malignant neoplasm of upper-outer quadrant of left breast in female, estrogen receptor negative ([facility] [address]    Initial Diagnosis    Malignant neoplasm of upper-outer quadrant of left breast in female, estrogen receptor negative ([facility] [address] [facility]     3/20/2020 Surgery    Left breast lumpectomy with sentinel node sampling. Subsequent reexcision with negative pathology     2020 -  Chemotherapy    Dose dense doxorubicin/cyclophosphamide followed by weekly paclitaxel     2020 - 2020 Radiation    Left Breast: 4240cGy  Lumpectomy bed boost: 10Gy; Total dose= 5240 cGy         HPI:   James Cano is a 67 y.o. female who has a history as above, who returns today for a routine follow-up visit. The patient was last seen by me in 2022 and was doing well at that time without evidence of recurrent or metastatic disease. Her most recent mammogram was obtained bilaterally on 22 showing benign findings. Currently, the patient reports she's been doing well. Her energy level has been fairly good overall. She denies any fevers, chills, or drenching night sweats. Her weight and appetite have been stable. She denies any chest pain or shortness of breath. She has not noticed any new lumps or bumps anywhere throughout her body. She denies the new onset of bony pain. She is performing self breast examinations intermittently, however, has not noticed any areas that concern her. She denies any nipple discharge, skin redness, thickening, dimpling, or arm swelling. She continues to complain of paresthesias of her fingers and feet. RADIOLOGIC STUDIES:  9/29/22  Impression   Stable benign mammogram.       BIRADS:   BIRADS - CATEGORY 2       Benign Findings. Normal interval follow-up is recommended in 12 months.        OVERALL ASSESSMENT - BENIGN       LABORATORY STUDIES:   CBC:   Lab Results   Component Value Date/Time    WBC 6.4 08/12/2022 10:17 AM    RBC 3.97 08/12/2022 10:17 AM    HGB 12.6 08/12/2022 10:17 AM    HCT 37.6 08/12/2022 10:17 AM    MCV 94.7 08/12/2022 10:17 AM    MCH 31.7 08/12/2022 10:17 AM    MCHC 33.5 08/12/2022 10:17 AM    RDW 14.5 08/12/2022 10:17 AM     08/12/2022 10:17 AM    MPV 9.3 08/12/2022 10:17 AM     CMP:  Lab Results   Component Value Date/Time     08/12/2022 10:17 AM    K 4.2 08/12/2022 10:17 AM     08/12/2022 10:17 AM    CO2 27 08/12/2022 10:17 AM    BUN 14 08/12/2022 10:17 AM    CREATININE 0.8 08/12/2022 10:17 AM    GFRAA >60 08/12/2022 10:17 AM    GFRAA >60 05/22/2012 09:18 AM    AGRATIO 1.9 01/29/2020 08:58 AM    LABGLOM >60 08/12/2022 10:17 AM    GLUCOSE 92 08/12/2022 10:17 AM    PROT 6.4 08/12/2022 10:17 AM    PROT 7.0 05/22/2012 09:18 AM    LABALBU 4.4 08/12/2022 10:17 AM    CALCIUM 9.6 08/12/2022 10:17 AM    BILITOT 1.1 08/12/2022 10:17 AM    ALKPHOS 71 08/12/2022 10:17 AM    AST 34 08/12/2022 10:17 AM    ALT 41 08/12/2022 10:17 AM     Onc labs: No results found for: PSA, CEA, LDH, AFP    MEDICATIONS:   Current Outpatient Medications   Medication Sig Dispense Refill    atenolol (TENORMIN) 100 MG tablet Take 1 tablet by mouth daily 90 tablet 1    DULoxetine (CYMBALTA) 60 MG extended release capsule Take 1 capsule by mouth in the morning. (Patient not taking: No sig reported) 30 capsule 3    Calcium Carb-Cholecalciferol (CALTRATE 600+D) 600-800 MG-UNIT TABS 1 tab twice per day 180 tablet 3    aspirin EC 81 MG EC tablet Take 1 tablet by mouth daily 90 tablet 3    atorvastatin (LIPITOR) 40 MG tablet Take 1 tablet by mouth daily To replace the 10 mg 90 tablet 3    nitroGLYCERIN (NITROSTAT) 0.4 MG SL tablet Place 1 tablet under the tongue every 5 minutes as needed for Chest pain up to max of 3 total doses. If no relief after 1 dose, call 911. (Patient not taking: No sig reported) 25 tablet 1    Omega-3 Fatty Acids (FISH OIL PO) Take 1 tablet by mouth daily      Probiotic Product (ALIGN PO) Take 1 tablet by mouth daily      vitamin B-12 (CYANOCOBALAMIN) 1000 MCG tablet Take 1,000 mcg by mouth daily      clobetasol (OLUX) 0.05 % foam  (Patient not taking: No sig reported)      ketoconazole (NIZORAL) 2 % shampoo       losartan (COZAAR) 50 MG tablet Take 1 tablet by mouth daily      Multiple Vitamin (MULTIVITAMIN PO) Take  by mouth daily. No current facility-administered medications for this encounter. EXAMINATION:   There were no vitals filed for this visit. The patient is in no acute distress. Neck: Supple no preauricular postauricular, submental, submandibular, cervical, supraclavicular, or infraclavicular lymphadenopathy is present. Heart: Regular rate and rhythm. No murmurs, clicks, or gallops are present. Lungs: Bilaterally clear to auscultation. No rales, rhonchi, or wheezing are present. Breast examination: Left-well-healed surgical scar laterally with moderate tissue deficit. Mild fibrosis at the lumpectomy cavity. No nipple discharge is elicited. Right-no palpable masses. No nipple discharge is elicited. No axillary lymphadenopathy is palpable  Abdomen: Soft, nontender and nondistended.   No hepatosplenomegaly or masses are appreciated. Extremities: No cyanosis, clubbing, or edema is present. ASSESSMENT AND PLAN:     Jessi Mckeon is a 67 y.o. female who has a history of a stage IIa invasive left-sided breast cancer. Triple receptor negative who is now approximately 2 years after completion of her adjuvant left breast RT who is doing well at this time without evidence of recurrent or metastatic disease. She is to follow with medical oncology regularly, as scheduled, to undergo bilateral mammography in September of this year. As she is 2 years out, doing well, and seeing Dr. Dixon Pratt regularly, I will release her from my care at this time    The patient is to continue to follow CDC's Covid 19 precautionary measures.       Electronically signed by Alba Hedrick MD on 2/14/2023 at 9:47 AM

## 2023-02-14 NOTE — PROGRESS NOTES
Pt reported today for port flush and lab draw. Has a Mediport on Right  side chest and was accessed with a 20 G  1 in  Gripper Plus Non-Coring safety needle and was accessed on 1st attempt, blood return was noted, labs drawn and sent to lab for processing. Port was flushed with 20 cc's (0.9% Sodium Chloride Injection USP) and locked with 500 units of heparin. Pt tolerated procedure well.

## 2023-02-16 LAB — CANCER AG27-29 SERPL-ACNC: 23.2 U/ML

## 2023-06-28 ENCOUNTER — OFFICE VISIT (OUTPATIENT)
Dept: FAMILY MEDICINE CLINIC | Age: 73
End: 2023-06-28
Payer: MEDICARE

## 2023-06-28 VITALS
BODY MASS INDEX: 32 KG/M2 | SYSTOLIC BLOOD PRESSURE: 138 MMHG | DIASTOLIC BLOOD PRESSURE: 80 MMHG | OXYGEN SATURATION: 95 % | HEART RATE: 63 BPM | WEIGHT: 186.4 LBS

## 2023-06-28 DIAGNOSIS — B49 FUNGAL INFECTION: ICD-10-CM

## 2023-06-28 DIAGNOSIS — L08.1 ERYTHRASMA: ICD-10-CM

## 2023-06-28 DIAGNOSIS — I25.10 CORONARY ARTERY DISEASE INVOLVING NATIVE CORONARY ARTERY OF NATIVE HEART WITHOUT ANGINA PECTORIS: ICD-10-CM

## 2023-06-28 DIAGNOSIS — I10 ESSENTIAL HYPERTENSION: Primary | ICD-10-CM

## 2023-06-28 DIAGNOSIS — I25.119 ATHEROSCLEROSIS OF NATIVE CORONARY ARTERY OF NATIVE HEART WITH ANGINA PECTORIS (HCC): ICD-10-CM

## 2023-06-28 PROCEDURE — 99214 OFFICE O/P EST MOD 30 MIN: CPT | Performed by: FAMILY MEDICINE

## 2023-06-28 PROCEDURE — 3078F DIAST BP <80 MM HG: CPT | Performed by: FAMILY MEDICINE

## 2023-06-28 PROCEDURE — 3074F SYST BP LT 130 MM HG: CPT | Performed by: FAMILY MEDICINE

## 2023-06-28 PROCEDURE — 1123F ACP DISCUSS/DSCN MKR DOCD: CPT | Performed by: FAMILY MEDICINE

## 2023-06-28 RX ORDER — HYDROCORTISONE VALERATE CREAM 2 MG/G
CREAM TOPICAL 2 TIMES DAILY
Qty: 45 G | Refills: 0 | Status: SHIPPED | OUTPATIENT
Start: 2023-06-28

## 2023-06-28 RX ORDER — KETOCONAZOLE 20 MG/G
CREAM TOPICAL 2 TIMES DAILY
Qty: 30 G | Refills: 0 | Status: SHIPPED | OUTPATIENT
Start: 2023-06-28

## 2023-06-28 RX ORDER — NITROGLYCERIN 0.4 MG/1
0.4 TABLET SUBLINGUAL EVERY 5 MIN PRN
Qty: 25 TABLET | Refills: 1 | Status: SHIPPED | OUTPATIENT
Start: 2023-06-28

## 2023-06-28 RX ORDER — ATORVASTATIN CALCIUM 40 MG/1
40 TABLET, FILM COATED ORAL DAILY
Qty: 90 TABLET | Refills: 3 | Status: SHIPPED | OUTPATIENT
Start: 2023-06-28

## 2023-06-28 RX ORDER — ATENOLOL 100 MG/1
100 TABLET ORAL DAILY
Qty: 90 TABLET | Refills: 1 | Status: SHIPPED | OUTPATIENT
Start: 2023-06-28

## 2023-06-28 ASSESSMENT — ENCOUNTER SYMPTOMS
CHEST TIGHTNESS: 0
SHORTNESS OF BREATH: 0

## 2023-06-29 RX ORDER — BENZOYL PEROXIDE 10 G/100G
GEL TOPICAL EVERY MORNING
Qty: 42.5 G | Refills: 0 | Status: SHIPPED | OUTPATIENT
Start: 2023-06-29

## 2023-08-15 ENCOUNTER — HOSPITAL ENCOUNTER (OUTPATIENT)
Age: 73
Discharge: HOME | End: 2023-08-15
Payer: MEDICARE

## 2023-08-15 ENCOUNTER — HOSPITAL ENCOUNTER (OUTPATIENT)
Dept: INFUSION THERAPY | Age: 73
Discharge: HOME | End: 2023-08-15
Payer: MEDICARE

## 2023-08-15 ENCOUNTER — OFFICE VISIT (OUTPATIENT)
Dept: ONCOLOGY | Age: 73
End: 2023-08-15
Payer: MEDICARE

## 2023-08-15 ENCOUNTER — HOSPITAL ENCOUNTER (OUTPATIENT)
Dept: GENERAL RADIOLOGY | Age: 73
Discharge: HOME | End: 2023-08-15
Payer: MEDICARE

## 2023-08-15 VITALS
WEIGHT: 186.4 LBS | HEART RATE: 58 BPM | BODY MASS INDEX: 31.82 KG/M2 | OXYGEN SATURATION: 99 % | TEMPERATURE: 97.2 F | DIASTOLIC BLOOD PRESSURE: 73 MMHG | SYSTOLIC BLOOD PRESSURE: 186 MMHG | HEIGHT: 64 IN

## 2023-08-15 DIAGNOSIS — M85.89 OSTEOPENIA OF MULTIPLE SITES: ICD-10-CM

## 2023-08-15 DIAGNOSIS — Z17.1 MALIGNANT NEOPLASM OF UPPER-OUTER QUADRANT OF LEFT BREAST IN FEMALE, ESTROGEN RECEPTOR NEGATIVE (HCC): Primary | ICD-10-CM

## 2023-08-15 DIAGNOSIS — M25.531 RIGHT WRIST PAIN: ICD-10-CM

## 2023-08-15 DIAGNOSIS — C50.412 MALIGNANT NEOPLASM OF UPPER-OUTER QUADRANT OF LEFT BREAST IN FEMALE, ESTROGEN RECEPTOR NEGATIVE (HCC): Primary | ICD-10-CM

## 2023-08-15 DIAGNOSIS — C50.412 MALIGNANT NEOPLASM OF UPPER-OUTER QUADRANT OF LEFT BREAST IN FEMALE, ESTROGEN RECEPTOR NEGATIVE (HCC): ICD-10-CM

## 2023-08-15 DIAGNOSIS — Z17.1 MALIGNANT NEOPLASM OF UPPER-OUTER QUADRANT OF LEFT BREAST IN FEMALE, ESTROGEN RECEPTOR NEGATIVE (HCC): ICD-10-CM

## 2023-08-15 LAB
ALBUMIN SERPL-MCNC: 4.5 GM/DL (ref 3.4–5)
ALP BLD-CCNC: 65 IU/L (ref 40–128)
ALT SERPL-CCNC: 37 U/L (ref 10–40)
ANION GAP SERPL CALCULATED.3IONS-SCNC: 12 MMOL/L (ref 4–16)
AST SERPL-CCNC: 39 IU/L (ref 15–37)
BASOPHILS ABSOLUTE: 0.1 K/CU MM
BASOPHILS RELATIVE PERCENT: 1 % (ref 0–1)
BILIRUB SERPL-MCNC: 1.1 MG/DL (ref 0–1)
BUN SERPL-MCNC: 24 MG/DL (ref 6–23)
CALCIUM SERPL-MCNC: 10.2 MG/DL (ref 8.3–10.6)
CHLORIDE BLD-SCNC: 102 MMOL/L (ref 99–110)
CO2: 25 MMOL/L (ref 21–32)
CREAT SERPL-MCNC: 0.8 MG/DL (ref 0.6–1.1)
DIFFERENTIAL TYPE: ABNORMAL
EOSINOPHILS ABSOLUTE: 0.8 K/CU MM
EOSINOPHILS RELATIVE PERCENT: 10.2 % (ref 0–3)
GFR SERPL CREATININE-BSD FRML MDRD: >60 ML/MIN/1.73M2
GLUCOSE SERPL-MCNC: 115 MG/DL (ref 70–99)
HCT VFR BLD CALC: 38.7 % (ref 37–47)
HEMOGLOBIN: 12.9 GM/DL (ref 12.5–16)
LYMPHOCYTES ABSOLUTE: 1.9 K/CU MM
LYMPHOCYTES RELATIVE PERCENT: 24.4 % (ref 24–44)
MCH RBC QN AUTO: 31.4 PG (ref 27–31)
MCHC RBC AUTO-ENTMCNC: 33.3 % (ref 32–36)
MCV RBC AUTO: 94.2 FL (ref 78–100)
MONOCYTES ABSOLUTE: 0.8 K/CU MM
MONOCYTES RELATIVE PERCENT: 10.5 % (ref 0–4)
PDW BLD-RTO: 14.4 % (ref 11.7–14.9)
PLATELET # BLD: 278 K/CU MM (ref 140–440)
PMV BLD AUTO: 9.6 FL (ref 7.5–11.1)
POTASSIUM SERPL-SCNC: 4.7 MMOL/L (ref 3.5–5.1)
RBC # BLD: 4.11 M/CU MM (ref 4.2–5.4)
SEGMENTED NEUTROPHILS ABSOLUTE COUNT: 4.2 K/CU MM
SEGMENTED NEUTROPHILS RELATIVE PERCENT: 53.9 % (ref 36–66)
SODIUM BLD-SCNC: 139 MMOL/L (ref 135–145)
TOTAL PROTEIN: 7 GM/DL (ref 6.4–8.2)
WBC # BLD: 7.7 K/CU MM (ref 4–10.5)

## 2023-08-15 PROCEDURE — 6360000002 HC RX W HCPCS: Performed by: INTERNAL MEDICINE

## 2023-08-15 PROCEDURE — 2580000003 HC RX 258: Performed by: INTERNAL MEDICINE

## 2023-08-15 PROCEDURE — 99211 OFF/OP EST MAY X REQ PHY/QHP: CPT

## 2023-08-15 PROCEDURE — 3077F SYST BP >= 140 MM HG: CPT | Performed by: INTERNAL MEDICINE

## 2023-08-15 PROCEDURE — 73110 X-RAY EXAM OF WRIST: CPT

## 2023-08-15 PROCEDURE — 99213 OFFICE O/P EST LOW 20 MIN: CPT | Performed by: INTERNAL MEDICINE

## 2023-08-15 PROCEDURE — 1123F ACP DISCUSS/DSCN MKR DOCD: CPT | Performed by: INTERNAL MEDICINE

## 2023-08-15 PROCEDURE — 36591 DRAW BLOOD OFF VENOUS DEVICE: CPT

## 2023-08-15 PROCEDURE — 86300 IMMUNOASSAY TUMOR CA 15-3: CPT

## 2023-08-15 PROCEDURE — 85025 COMPLETE CBC W/AUTO DIFF WBC: CPT

## 2023-08-15 PROCEDURE — 80053 COMPREHEN METABOLIC PANEL: CPT

## 2023-08-15 PROCEDURE — 3078F DIAST BP <80 MM HG: CPT | Performed by: INTERNAL MEDICINE

## 2023-08-15 RX ORDER — SODIUM CHLORIDE 0.9 % (FLUSH) 0.9 %
20 SYRINGE (ML) INJECTION PRN
Status: DISCONTINUED | OUTPATIENT
Start: 2023-08-15 | End: 2023-08-16 | Stop reason: HOSPADM

## 2023-08-15 RX ORDER — HEPARIN 100 UNIT/ML
500 SYRINGE INTRAVENOUS PRN
Status: DISCONTINUED | OUTPATIENT
Start: 2023-08-15 | End: 2023-08-16 | Stop reason: HOSPADM

## 2023-08-15 RX ADMIN — HEPARIN 500 UNITS: 100 SYRINGE at 12:25

## 2023-08-15 RX ADMIN — SODIUM CHLORIDE, PRESERVATIVE FREE 20 ML: 5 INJECTION INTRAVENOUS at 12:25

## 2023-08-15 ASSESSMENT — PATIENT HEALTH QUESTIONNAIRE - PHQ9
SUM OF ALL RESPONSES TO PHQ QUESTIONS 1-9: 0
1. LITTLE INTEREST OR PLEASURE IN DOING THINGS: 0
2. FEELING DOWN, DEPRESSED OR HOPELESS: 0
SUM OF ALL RESPONSES TO PHQ9 QUESTIONS 1 & 2: 0
SUM OF ALL RESPONSES TO PHQ QUESTIONS 1-9: 0

## 2023-08-15 NOTE — PROGRESS NOTES
Pt here for port draw. Port accessed with blood return noted. Flushed with 20 cc NS and 10 cc blood wasted. CBC CMP Ca Antigen drawn and sent. Port then flushed with 20 cc NS and 500 units of heparin. Port then Standard [place]. Pt left ambulatory instructed to stop at check out desk for next OV and discharge paperwork.

## 2023-08-15 NOTE — PROGRESS NOTES
Patient Name: Aileen Gutierrez  Patient : 1950  Patient MRN: [id number]     Primary Oncologist: Jyoti Lay MD  Referring Provider: Melia López MD     Date of Service: 8/15/2023      Chief Complaint:   Chief Complaint   Patient presents with    Follow-up     Patient Active Problem List:     Malignant neoplasm of female breast      Postoperative nausea     Neutropenic fever      Alopecia (capitis) totalis    HPI:   Ms. Fabrizio Martinez is a 50-year-old very pleasant female with medical history significant for hypertension, hyperlipidemia, coronary artery disease, initially referred to me on 2020 for evaluation of his clinical stage IIA left breast, high grade, triple negative, invasive ductal carcinoma. She stated that she has echocardiogram and stress test on 2020. It showed normal EF (EF 51%). Concentric left ventricular hypertrophy and she has mild to moderate mitral regurfitation. She was incidentally noted to have left breast mass. She was subsequently evaluated by Dr. Saleem Stewart and she requested diagnostic mammogram. She underwent diagnostic digital bilateral mammogram on 2020 and it showed dumbbell-shaped mass at 2-3 o'clock at middle to posterior depth with associated pleomorphic calcifications, in her left breast. Targeted ultrasound at 2 o'clock at a distance of 7 cm from the nipple demonstrates a dumbbell-shaped hypoechoic mass with angular margins that measures 3.3 x 2.5 x 2.1 cm. Internal vascularity noted along with some posterior acoustic shadowing. No suspicious axillary lymph nodes. She underwent ultrasound guided core biopsy of left breast and clip marker placement on 2020 and final pathology showed invasive ductal carcinoma. Tumor size is at least 12 mm and it is a high grade tumor. ER by IHC is negative (0%), PgR by IHC is negative (0%), Her2 by IHC is equivocal (score 2+) and Her2 by FISH is negative.      Since she is found to have clinical stage

## 2023-08-15 NOTE — PROGRESS NOTES
MA Rooming Questions  Patient: Paras Baldwin  MRN: [id number]    Date: 8/15/2023        1. Do you have any new issues?   no         2. Do you need any refills on medications?    no    3. Have you had any imaging done since your last visit?   no    4. Have you been hospitalized or seen in the emergency room since your last visit here?   no    5. Did the patient have a depression screening completed today?  Yes    PHQ-9 Total Score: 0 (8/15/2023 11:30 AM)       PHQ-9 Given to (if applicable):               PHQ-9 Score (if applicable):                     [] Positive     []  Negative              Does question #9 need addressed (if applicable)                     [] Yes    []  No               Janett Horne CMA

## 2023-08-16 ENCOUNTER — CLINICAL DOCUMENTATION (OUTPATIENT)
Dept: ONCOLOGY | Age: 73
End: 2023-08-16

## 2023-08-16 DIAGNOSIS — Z17.1 MALIGNANT NEOPLASM OF UPPER-OUTER QUADRANT OF LEFT BREAST IN FEMALE, ESTROGEN RECEPTOR NEGATIVE (HCC): Primary | ICD-10-CM

## 2023-08-16 DIAGNOSIS — C50.412 MALIGNANT NEOPLASM OF UPPER-OUTER QUADRANT OF LEFT BREAST IN FEMALE, ESTROGEN RECEPTOR NEGATIVE (HCC): Primary | ICD-10-CM

## 2023-08-16 NOTE — PROGRESS NOTES
Brian time and prep mailed to patient to be done on 10/2/23 [facility] arrival at 2 PM.

## 2023-08-17 LAB — CANCER AG27-29 SERPL-ACNC: 22.2 U/ML

## 2023-08-21 ENCOUNTER — CLINICAL DOCUMENTATION (OUTPATIENT)
Dept: ONCOLOGY | Age: 73
End: 2023-08-21

## 2023-08-21 NOTE — PROGRESS NOTES
This nurse called the patient @ [phone] to review xray results. Patient was notified that the xray showed arthritis changes and osteopenia, as well as no fractures or dislocation noted. Patient verbalized understanding and denies further needs at this time.

## 2023-08-25 ENCOUNTER — TELEPHONE (OUTPATIENT)
Dept: FAMILY MEDICINE CLINIC | Age: 73
End: 2023-08-25

## 2023-08-25 NOTE — TELEPHONE ENCOUNTER
Patient called said she sent to get her eyes checked at [facility] and they told her to schedule apt to have her bp checked to see if it causing spots in the eyes.

## 2023-08-28 ENCOUNTER — OFFICE VISIT (OUTPATIENT)
Dept: FAMILY MEDICINE CLINIC | Age: 73
End: 2023-08-28
Payer: MEDICARE

## 2023-08-28 ENCOUNTER — NURSE ONLY (OUTPATIENT)
Dept: FAMILY MEDICINE CLINIC | Age: 73
End: 2023-08-28

## 2023-08-28 VITALS
WEIGHT: 189.8 LBS | HEART RATE: 65 BPM | BODY MASS INDEX: 32.58 KG/M2 | SYSTOLIC BLOOD PRESSURE: 146 MMHG | OXYGEN SATURATION: 95 % | DIASTOLIC BLOOD PRESSURE: 80 MMHG

## 2023-08-28 VITALS — DIASTOLIC BLOOD PRESSURE: 80 MMHG | SYSTOLIC BLOOD PRESSURE: 146 MMHG

## 2023-08-28 DIAGNOSIS — I10 ESSENTIAL HYPERTENSION: Primary | ICD-10-CM

## 2023-08-28 PROCEDURE — 1123F ACP DISCUSS/DSCN MKR DOCD: CPT | Performed by: FAMILY MEDICINE

## 2023-08-28 PROCEDURE — 99213 OFFICE O/P EST LOW 20 MIN: CPT | Performed by: FAMILY MEDICINE

## 2023-08-28 PROCEDURE — 3079F DIAST BP 80-89 MM HG: CPT | Performed by: FAMILY MEDICINE

## 2023-08-28 PROCEDURE — 3077F SYST BP >= 140 MM HG: CPT | Performed by: FAMILY MEDICINE

## 2023-08-28 RX ORDER — LOSARTAN POTASSIUM 100 MG/1
100 TABLET ORAL DAILY
Qty: 90 TABLET | Refills: 0 | Status: SHIPPED | OUTPATIENT
Start: 2023-08-28

## 2023-08-28 SDOH — ECONOMIC STABILITY: INCOME INSECURITY: HOW HARD IS IT FOR YOU TO PAY FOR THE VERY BASICS LIKE FOOD, HOUSING, MEDICAL CARE, AND HEATING?: NOT VERY HARD

## 2023-08-28 SDOH — ECONOMIC STABILITY: HOUSING INSECURITY
IN THE LAST 12 MONTHS, WAS THERE A TIME WHEN YOU DID NOT HAVE A STEADY PLACE TO SLEEP OR SLEPT IN A SHELTER (INCLUDING NOW)?: NO

## 2023-08-28 SDOH — ECONOMIC STABILITY: FOOD INSECURITY: WITHIN THE PAST 12 MONTHS, YOU WORRIED THAT YOUR FOOD WOULD RUN OUT BEFORE YOU GOT MONEY TO BUY MORE.: NEVER TRUE

## 2023-08-28 SDOH — ECONOMIC STABILITY: FOOD INSECURITY: WITHIN THE PAST 12 MONTHS, THE FOOD YOU BOUGHT JUST DIDN'T LAST AND YOU DIDN'T HAVE MONEY TO GET MORE.: NEVER TRUE

## 2023-08-28 NOTE — PROGRESS NOTES
COLONOSCOPY  2009        COLONOSCOPY  02/27/2015    diverticulosis, internal hemorroids. repeat 5 years. Hero Ferreira    PORT SURGERY Right 03/20/2020    PORT SURGERY Right 3/20/2020    PORT INSERTION performed by Brittani Mcqueen MD at [address] Right 4/27/2016    TUBAL LIGATION  1980    US BREAST BIOPSY W LOC DEVICE 1ST LESION LEFT Left 9/14/2021    US BREAST NEEDLE BIOPSY LEFT 9/14/2021 Susana Lowe MD [address]       Family History   Problem Relation Age of Onset    High Blood Pressure Mother     Stroke Father     Coronary Art Dis Father     High Blood Pressure Father     No Known Problems Sister     Heart Attack Brother 72    Breast Cancer Neg Hx     Ovarian Cancer Neg Hx        Current Outpatient Medications on File Prior to Visit   Medication Sig Dispense Refill    benzoyl peroxide 10 % gel Apply topically every morning Apply topically daily. 42.5 g 0    atenolol (TENORMIN) 100 MG tablet Take 1 tablet by mouth daily 90 tablet 1    atorvastatin (LIPITOR) 40 MG tablet Take 1 tablet by mouth daily To replace the 10 mg 90 tablet 3    nitroGLYCERIN (NITROSTAT) 0.4 MG SL tablet Place 1 tablet under the tongue every 5 minutes as needed for Chest pain up to max of 3 total doses. If no relief after 1 dose, call 911. 25 tablet 1    ketoconazole (NIZORAL) 2 % cream Apply topically 2 times daily 30 g 0    hydrocortisone (WESTCORT) 0.2 % cream Apply topically 2 times daily Apply topically 2 times daily. 45 g 0    DULoxetine (CYMBALTA) 60 MG extended release capsule Take 1 capsule by mouth in the morning.  30 capsule 3    Calcium Carb-Cholecalciferol (CALTRATE 600+D) 600-800 MG-UNIT TABS 1 tab twice per day 180 tablet 3    aspirin EC 81 MG EC tablet Take 1 tablet by mouth daily 90 tablet 3    Omega-3 Fatty Acids (FISH OIL PO) Take 1 tablet by mouth daily      Probiotic Product (ALIGN PO) Take 1 tablet by mouth daily      vitamin B-12 (CYANOCOBALAMIN) 1000 MCG tablet Take 1 tablet

## 2023-08-28 NOTE — PATIENT INSTRUCTIONS
adapted under license by [id number]. If you have questions about a medical condition or this instruction, always ask your healthcare professional. [address] any warranty or liability for your use of this information.

## 2023-09-12 ENCOUNTER — OFFICE VISIT (OUTPATIENT)
Dept: FAMILY MEDICINE CLINIC | Age: 73
End: 2023-09-12
Payer: MEDICARE

## 2023-09-12 VITALS
BODY MASS INDEX: 32.27 KG/M2 | SYSTOLIC BLOOD PRESSURE: 132 MMHG | OXYGEN SATURATION: 96 % | WEIGHT: 188 LBS | HEART RATE: 86 BPM | DIASTOLIC BLOOD PRESSURE: 72 MMHG

## 2023-09-12 DIAGNOSIS — Z23 NEED FOR INFLUENZA VACCINATION: ICD-10-CM

## 2023-09-12 DIAGNOSIS — I10 ESSENTIAL HYPERTENSION: Primary | ICD-10-CM

## 2023-09-12 DIAGNOSIS — M85.89 OSTEOPENIA OF MULTIPLE SITES: ICD-10-CM

## 2023-09-12 LAB
ANION GAP SERPL CALCULATED.3IONS-SCNC: 11 MMOL/L (ref 3–16)
BUN SERPL-MCNC: 16 MG/DL (ref 7–20)
CALCIUM SERPL-MCNC: 10.1 MG/DL (ref 8.3–10.6)
CHLORIDE SERPL-SCNC: 107 MMOL/L (ref 99–110)
CO2 SERPL-SCNC: 26 MMOL/L (ref 21–32)
CREAT SERPL-MCNC: 0.8 MG/DL (ref 0.6–1.2)
GFR SERPLBLD CREATININE-BSD FMLA CKD-EPI: >60 ML/MIN/{1.73_M2}
GLUCOSE SERPL-MCNC: 96 MG/DL (ref 70–99)
POTASSIUM SERPL-SCNC: 4.7 MMOL/L (ref 3.5–5.1)
SODIUM SERPL-SCNC: 144 MMOL/L (ref 136–145)

## 2023-09-12 PROCEDURE — 99213 OFFICE O/P EST LOW 20 MIN: CPT | Performed by: FAMILY MEDICINE

## 2023-09-12 PROCEDURE — 3075F SYST BP GE 130 - 139MM HG: CPT | Performed by: FAMILY MEDICINE

## 2023-09-12 PROCEDURE — 36415 COLL VENOUS BLD VENIPUNCTURE: CPT | Performed by: FAMILY MEDICINE

## 2023-09-12 PROCEDURE — G0008 ADMIN INFLUENZA VIRUS VAC: HCPCS | Performed by: FAMILY MEDICINE

## 2023-09-12 PROCEDURE — 90694 VACC AIIV4 NO PRSRV 0.5ML IM: CPT | Performed by: FAMILY MEDICINE

## 2023-09-12 PROCEDURE — 3078F DIAST BP <80 MM HG: CPT | Performed by: FAMILY MEDICINE

## 2023-09-12 PROCEDURE — 1123F ACP DISCUSS/DSCN MKR DOCD: CPT | Performed by: FAMILY MEDICINE

## 2023-09-12 RX ORDER — CAL/D3/MAG11/ZINC/COP/MANG/BOR 600 MG-800
1 TABLET ORAL 2 TIMES DAILY
Qty: 180 TABLET | Refills: 3 | Status: SHIPPED | OUTPATIENT
Start: 2023-09-12

## 2023-09-12 NOTE — PROGRESS NOTES
Patient ID: Janet Blandon 1950    . Chief Complaint   Patient presents with    Hypertension         Hypertension  This is a chronic problem. The current episode started more than 1 year ago. The problem has been gradually improving since onset. The problem is controlled. Risk factors for coronary artery disease include post-menopausal state and obesity. Past treatments include angiotensin blockers. There are no compliance problems. optometrist at [facility] was concerned about eye changes. Patient Active Problem List   Diagnosis    Essential hypertension    Hyperlipidemia    History of colon polyps    Obesity (BMI 30.0-34.9)    Osteopenia of multiple sites    Coronary artery disease involving native coronary artery of native heart without angina pectoris    Hepatic steatosis    Malignant neoplasm of upper-outer quadrant of left breast in female, estrogen receptor negative (HCC)    Postoperative nausea    Alopecia (capitis) totalis    Diarrhea due to drug    Chemotherapy-induced neuropathy (HCC)    LBBB (left bundle branch block)    Angina pectoris, unspecified    Atherosclerotic heart disease of native coronary artery with unspecified angina pectoris       Past Surgical History:   Procedure Laterality Date    BREAST BIOPSY Left 2020    BREAST BIOPSY Left 2020    sentinal node, partial mastectomy left    BREAST LUMPECTOMY Left 3/20/2020    LEFT BREAST LUMPECTOMY WITH NEEDLE LOC AND SENTINEL NODE BIOPSY performed by Krys Turner MD at [address] Left 3/30/2020    BREAST LESION RE EXCISION LEFT LUMPECTOMY SITE performed by Krys Turner MD at [address] TEST  2009    treadmill, Dr. Nyasia Almeida Right 's    right     SECTION      COLONOSCOPY          COLONOSCOPY  2015    diverticulosis, internal hemorroids.  repeat 5 years. Johnie Martinez    PORT SURGERY Right 2020    PORT SURGERY

## 2023-10-02 ENCOUNTER — HOSPITAL ENCOUNTER (OUTPATIENT)
Dept: WOMENS IMAGING | Age: 73
Discharge: HOME | End: 2023-10-02
Attending: INTERNAL MEDICINE
Payer: MEDICARE

## 2023-10-02 ENCOUNTER — HOSPITAL ENCOUNTER (OUTPATIENT)
Dept: ULTRASOUND IMAGING | Age: 73
Discharge: HOME | End: 2023-10-02
Attending: INTERNAL MEDICINE
Payer: MEDICARE

## 2023-10-02 DIAGNOSIS — C50.412 MALIGNANT NEOPLASM OF UPPER-OUTER QUADRANT OF LEFT BREAST IN FEMALE, ESTROGEN RECEPTOR NEGATIVE (HCC): ICD-10-CM

## 2023-10-02 DIAGNOSIS — M25.531 RIGHT WRIST PAIN: ICD-10-CM

## 2023-10-02 DIAGNOSIS — M85.89 OSTEOPENIA OF MULTIPLE SITES: ICD-10-CM

## 2023-10-02 DIAGNOSIS — Z17.1 MALIGNANT NEOPLASM OF UPPER-OUTER QUADRANT OF LEFT BREAST IN FEMALE, ESTROGEN RECEPTOR NEGATIVE (HCC): ICD-10-CM

## 2023-10-02 PROCEDURE — G0279 TOMOSYNTHESIS, MAMMO: HCPCS

## 2023-10-05 ENCOUNTER — TELEPHONE (OUTPATIENT)
Dept: INFUSION THERAPY | Age: 73
End: 2023-10-05

## 2023-10-05 NOTE — TELEPHONE ENCOUNTER
Pt would like to know if she still needs the breast US even though she just had her mammogram 10/2/23.

## 2023-10-06 ENCOUNTER — CLINICAL DOCUMENTATION (OUTPATIENT)
Dept: ONCOLOGY | Age: 73
End: 2023-10-06

## 2023-10-06 NOTE — PROGRESS NOTES
Patient called in stating central scheduling has called her x2 inquiring about getting her scheduled for breast ultrasounds. Patient had dx mammography on 10/2/23 and no ultrasound needed:    IMPRESSION:  No radiographic evidence of malignancy. BIRADS:  BIRADS - CATEGORY 2     Benign Findings. Normal interval follow-up is recommended in 12 months. Bilateral breast US orders cancelled out of system. Informed patient of above and she voiced understanding. Instructed to call with any other questions/concerns prior to office visit in February 2024.

## 2023-11-14 ENCOUNTER — HOSPITAL ENCOUNTER (OUTPATIENT)
Dept: INFUSION THERAPY | Age: 73
Discharge: HOME | End: 2023-11-14
Payer: MEDICARE

## 2023-11-14 DIAGNOSIS — Z17.1 MALIGNANT NEOPLASM OF UPPER-OUTER QUADRANT OF LEFT BREAST IN FEMALE, ESTROGEN RECEPTOR NEGATIVE (HCC): Primary | ICD-10-CM

## 2023-11-14 DIAGNOSIS — C50.412 MALIGNANT NEOPLASM OF UPPER-OUTER QUADRANT OF LEFT BREAST IN FEMALE, ESTROGEN RECEPTOR NEGATIVE (HCC): Primary | ICD-10-CM

## 2023-11-14 PROCEDURE — 36591 DRAW BLOOD OFF VENOUS DEVICE: CPT

## 2023-11-14 PROCEDURE — 6360000002 HC RX W HCPCS: Performed by: INTERNAL MEDICINE

## 2023-11-14 PROCEDURE — 2580000003 HC RX 258: Performed by: INTERNAL MEDICINE

## 2023-11-14 RX ORDER — WATER 10 ML/10ML
2.2 INJECTION INTRAMUSCULAR; INTRAVENOUS; SUBCUTANEOUS ONCE
OUTPATIENT
Start: 2023-11-14 | End: 2023-11-14

## 2023-11-14 RX ORDER — HEPARIN 100 UNIT/ML
500 SYRINGE INTRAVENOUS PRN
Status: DISCONTINUED | OUTPATIENT
Start: 2023-11-14 | End: 2023-11-15 | Stop reason: HOSPADM

## 2023-11-14 RX ORDER — SODIUM CHLORIDE 0.9 % (FLUSH) 0.9 %
10 SYRINGE (ML) INJECTION PRN
Status: CANCELLED | OUTPATIENT
Start: 2023-11-14

## 2023-11-14 RX ORDER — SODIUM CHLORIDE 0.9 % (FLUSH) 0.9 %
20 SYRINGE (ML) INJECTION PRN
Status: DISCONTINUED | OUTPATIENT
Start: 2023-11-14 | End: 2023-11-15 | Stop reason: HOSPADM

## 2023-11-14 RX ORDER — WATER 10 ML/10ML
2.2 INJECTION INTRAMUSCULAR; INTRAVENOUS; SUBCUTANEOUS ONCE
Status: CANCELLED | OUTPATIENT
Start: 2023-11-14 | End: 2023-11-14

## 2023-11-14 RX ORDER — HEPARIN 100 UNIT/ML
500 SYRINGE INTRAVENOUS PRN
OUTPATIENT
Start: 2023-11-14

## 2023-11-14 RX ORDER — SODIUM CHLORIDE 0.9 % (FLUSH) 0.9 %
10 SYRINGE (ML) INJECTION PRN
OUTPATIENT
Start: 2023-11-14

## 2023-11-14 RX ORDER — SODIUM CHLORIDE 0.9 % (FLUSH) 0.9 %
20 SYRINGE (ML) INJECTION PRN
OUTPATIENT
Start: 2023-11-14

## 2023-11-14 RX ADMIN — HEPARIN 500 UNITS: 100 SYRINGE at 11:36

## 2023-11-14 RX ADMIN — SODIUM CHLORIDE, PRESERVATIVE FREE 20 ML: 5 INJECTION INTRAVENOUS at 11:36

## 2023-11-14 NOTE — PROGRESS NOTES
Here for labs. Mediport accessed, \[facility] [place] [facility]\""vie blood return, labs drawn. Mediport flushed per protocol then Cheryl Agee- accessed. No complaints voiced. CBC results reviewed with patient. Discharged in stable condition.

## 2023-11-15 ENCOUNTER — TELEMEDICINE (OUTPATIENT)
Dept: FAMILY MEDICINE CLINIC | Age: 73
End: 2023-11-15
Payer: MEDICARE

## 2023-11-15 DIAGNOSIS — Z00.00 MEDICARE ANNUAL WELLNESS VISIT, SUBSEQUENT: Primary | ICD-10-CM

## 2023-11-15 PROCEDURE — 1123F ACP DISCUSS/DSCN MKR DOCD: CPT | Performed by: FAMILY MEDICINE

## 2023-11-15 PROCEDURE — G0439 PPPS, SUBSEQ VISIT: HCPCS | Performed by: FAMILY MEDICINE

## 2023-11-15 ASSESSMENT — LIFESTYLE VARIABLES
HOW OFTEN DO YOU HAVE A DRINK CONTAINING ALCOHOL: NEVER
HOW MANY STANDARD DRINKS CONTAINING ALCOHOL DO YOU HAVE ON A TYPICAL DAY: PATIENT DOES NOT DRINK

## 2023-11-15 NOTE — PATIENT INSTRUCTIONS
Personalized Preventive Plan for Detra Buerger - 11/15/2023  Medicare offers a range of preventive health benefits. Some of the tests and screenings are paid in full while other may be subject to a deductible, co-insurance, and/or copay. Some of these benefits include a comprehensive review of your medical history including lifestyle, illnesses that may run in your family, and various assessments and screenings as appropriate. After reviewing your medical record and screening and assessments performed today your provider may have ordered immunizations, labs, imaging, and/or referrals for you. A list of these orders (if applicable) as well as your Preventive Care list are included within your After Visit Summary for your review. Other Preventive Recommendations:    A preventive eye exam performed by an eye specialist is recommended every 1-2 years to screen for glaucoma; cataracts, macular degeneration, and other eye disorders. A preventive dental visit is recommended every 6 months. Try to get at least 150 minutes of exercise per week or 10,000 steps per day on a pedometer . Order or download the FREE \"Exercise & Physical Activity: Your Everyday Guide\" from [employer]. Call 3-884.908.2953 or search [employer] online. You need 5056-4259 mg of calcium and 6009-2409 IU of vitamin D per day. It is possible to meet your calcium requirement with diet alone, but a vitamin D supplement is usually necessary to meet this goal.  When exposed to the sun, use a sunscreen that protects against both UVA and UVB radiation with an SPF of 30 or greater. Reapply every 2 to 3 hours or after sweating, drying off with a towel, or swimming. Always wear a seat belt when traveling in a car. Always wear a helmet when riding a bicycle or motorcycle.
Need for Mobility Assisted Device

## 2023-11-15 NOTE — PROGRESS NOTES
This encounter was performed under my, Waylon Dickson, direct supervision, 11/15/2023.
(Appt Dept): [facility],  [address]

## 2023-11-27 ENCOUNTER — OFFICE VISIT (OUTPATIENT)
Dept: FAMILY MEDICINE CLINIC | Age: 73
End: 2023-11-27
Payer: MEDICARE

## 2023-11-27 ENCOUNTER — HOSPITAL ENCOUNTER (OUTPATIENT)
Age: 73
Setting detail: SPECIMEN
Discharge: HOME | End: 2023-11-27
Payer: MEDICARE

## 2023-11-27 VITALS
SYSTOLIC BLOOD PRESSURE: 142 MMHG | OXYGEN SATURATION: 100 % | DIASTOLIC BLOOD PRESSURE: 70 MMHG | HEART RATE: 60 BPM | WEIGHT: 185.6 LBS | BODY MASS INDEX: 31.86 KG/M2

## 2023-11-27 DIAGNOSIS — I10 ESSENTIAL HYPERTENSION: Primary | ICD-10-CM

## 2023-11-27 DIAGNOSIS — R09.81 NASAL CONGESTION: ICD-10-CM

## 2023-11-27 PROCEDURE — 99214 OFFICE O/P EST MOD 30 MIN: CPT | Performed by: FAMILY MEDICINE

## 2023-11-27 PROCEDURE — 1123F ACP DISCUSS/DSCN MKR DOCD: CPT | Performed by: FAMILY MEDICINE

## 2023-11-27 PROCEDURE — 3078F DIAST BP <80 MM HG: CPT | Performed by: FAMILY MEDICINE

## 2023-11-27 PROCEDURE — 87635 SARS-COV-2 COVID-19 AMP PRB: CPT

## 2023-11-27 PROCEDURE — 3077F SYST BP >= 140 MM HG: CPT | Performed by: FAMILY MEDICINE

## 2023-11-27 RX ORDER — ATENOLOL 100 MG/1
100 TABLET ORAL DAILY
Qty: 90 TABLET | Refills: 0 | Status: SHIPPED | OUTPATIENT
Start: 2023-11-27

## 2023-11-27 RX ORDER — LOSARTAN POTASSIUM 100 MG/1
100 TABLET ORAL DAILY
Qty: 90 TABLET | Refills: 0 | Status: SHIPPED | OUTPATIENT
Start: 2023-11-27

## 2023-11-27 NOTE — PROGRESS NOTES
1990's    right    [facility]    COLONOSCOPY  2009        COLONOSCOPY  02/27/2015    diverticulosis, internal hemorroids. repeat 5 years. Maynor Ammy    PORT SURGERY Right 03/20/2020    PORT SURGERY Right 3/20/2020    PORT INSERTION performed by Ugo Lucero MD at [address] Right 4/27/2016    TUBAL LIGATION  1980    US BREAST BIOPSY W LOC DEVICE 1ST LESION LEFT Left 9/14/2021    US BREAST NEEDLE BIOPSY LEFT 9/14/2021 Jade Martinez MD [address]       Family History   Problem Relation Age of Onset    High Blood Pressure Mother     Stroke Father     Coronary Art Dis Father     High Blood Pressure Father     High Blood Pressure Sister     Heart Disease Brother     Heart Attack Brother 72    COPD Brother     Ovarian Cancer Neg Hx        Current Outpatient Medications on File Prior to Visit   Medication Sig Dispense Refill    atorvastatin (LIPITOR) 40 MG tablet Take 1 tablet by mouth daily To replace the 10 mg 90 tablet 3    ketoconazole (NIZORAL) 2 % cream Apply topically 2 times daily 30 g 0    hydrocortisone (WESTCORT) 0.2 % cream Apply topically 2 times daily Apply topically 2 times daily. 45 g 0    Calcium Carb-Cholecalciferol (CALTRATE 600+D) 600-800 MG-UNIT TABS 1 tab twice per day 180 tablet 3    aspirin EC 81 MG EC tablet Take 1 tablet by mouth daily 90 tablet 3    Omega-3 Fatty Acids (FISH OIL PO) Take 1 tablet by mouth daily      Probiotic Product (ALIGN PO) Take 1 tablet by mouth daily      vitamin B-12 (CYANOCOBALAMIN) 1000 MCG tablet Take 1 tablet by mouth daily      clobetasol (OLUX) 0.05 % foam       Multiple Vitamin (MULTIVITAMIN PO) Take  by mouth daily. Caltrate 600+D Plus Minerals (CALTRATE) 600-800 MG-UNIT TABS tablet Take 1 tablet by mouth 2 times daily (Patient not taking: Reported on 11/15/2023) 180 tablet 3    benzoyl peroxide 10 % gel Apply topically every morning Apply topically daily.  (Patient not taking: Reported on 9/12/2023)

## 2023-11-28 DIAGNOSIS — U07.1 COVID-19 VIRUS INFECTION: Primary | ICD-10-CM

## 2023-11-28 LAB
SARS-COV-2 RNA RESP QL NAA+PROBE: DETECTED
SOURCE: ABNORMAL

## 2024-01-05 NOTE — PROGRESS NOTES
Patient ID: Veronica Garcia 1950    .  Chief Complaint   Patient presents with    Hypertension    Skin Tag     Left hand, no pain only when pulling on it, no itching, when it becomes lose she tears/pulls it off.          Hypertension  This is a chronic problem. The current episode started more than 1 year ago. The problem has been gradually improving since onset. The problem is controlled. Risk factors for coronary artery disease include post-menopausal state and obesity. Past treatments include angiotensin blockers. There are no compliance problems.      Skin lesions: left hand.  Flake off if she picks at it    Patient Active Problem List   Diagnosis    Essential hypertension    Hyperlipidemia    History of colon polyps    Obesity (BMI 30.0-34.9)    Osteopenia of multiple sites    Coronary artery disease involving native coronary artery of native heart without angina pectoris    Hepatic steatosis    Malignant neoplasm of upper-outer quadrant of left breast in female, estrogen receptor negative (HCC)    Postoperative nausea    Alopecia (capitis) totalis    Diarrhea due to drug    Chemotherapy-induced neuropathy (HCC)    LBBB (left bundle branch block)    Angina pectoris, unspecified    Atherosclerotic heart disease of native coronary artery with unspecified angina pectoris       Past Surgical History:   Procedure Laterality Date    BREAST BIOPSY Left 2020    BREAST BIOPSY Left 2020    sentinal node, partial mastectomy left    BREAST LUMPECTOMY Left 3/20/2020    LEFT BREAST LUMPECTOMY WITH NEEDLE LOC AND SENTINEL NODE BIOPSY performed by Leatha Brooks MD at [facility]    BREAST SURGERY Left 3/30/2020    BREAST LESION RE EXCISION LEFT LUMPECTOMY SITE performed by eLatha Brooks MD at [facility] OR    CARDIOVASCULAR STRESS TEST  2009    treadmill, Dr. Galindo    CARPAL TUNNEL RELEASE Right 's    right     SECTION      COLONOSCOPY          COLONOSCOPY  2015

## 2024-01-08 ENCOUNTER — OFFICE VISIT (OUTPATIENT)
Dept: FAMILY MEDICINE CLINIC | Age: 74
End: 2024-01-08
Payer: MEDICARE

## 2024-01-08 VITALS
BODY MASS INDEX: 31.48 KG/M2 | OXYGEN SATURATION: 98 % | SYSTOLIC BLOOD PRESSURE: 130 MMHG | DIASTOLIC BLOOD PRESSURE: 60 MMHG | HEART RATE: 56 BPM | WEIGHT: 183.4 LBS

## 2024-01-08 DIAGNOSIS — I10 ESSENTIAL HYPERTENSION: Primary | ICD-10-CM

## 2024-01-08 DIAGNOSIS — I25.119 ATHEROSCLEROSIS OF NATIVE CORONARY ARTERY OF NATIVE HEART WITH ANGINA PECTORIS (HCC): ICD-10-CM

## 2024-01-08 DIAGNOSIS — E78.5 HYPERLIPIDEMIA, UNSPECIFIED HYPERLIPIDEMIA TYPE: ICD-10-CM

## 2024-01-08 DIAGNOSIS — L82.1 SEBORRHEIC KERATOSES: ICD-10-CM

## 2024-01-08 LAB
CHOLEST SERPL-MCNC: 155 MG/DL (ref 0–199)
CREAT UR-MCNC: 97.1 MG/DL (ref 28–259)
HDLC SERPL-MCNC: 64 MG/DL (ref 40–60)
LDLC SERPL CALC-MCNC: 65 MG/DL
MICROALBUMIN UR DL<=1MG/L-MCNC: 1.7 MG/DL
MICROALBUMIN/CREAT UR: 17.5 MG/G (ref 0–30)
TRIGL SERPL-MCNC: 130 MG/DL (ref 0–150)
VLDLC SERPL CALC-MCNC: 26 MG/DL

## 2024-01-08 PROCEDURE — 3075F SYST BP GE 130 - 139MM HG: CPT | Performed by: FAMILY MEDICINE

## 2024-01-08 PROCEDURE — 99213 OFFICE O/P EST LOW 20 MIN: CPT | Performed by: FAMILY MEDICINE

## 2024-01-08 PROCEDURE — 36415 COLL VENOUS BLD VENIPUNCTURE: CPT | Performed by: FAMILY MEDICINE

## 2024-01-08 PROCEDURE — 1123F ACP DISCUSS/DSCN MKR DOCD: CPT | Performed by: FAMILY MEDICINE

## 2024-01-08 PROCEDURE — 3078F DIAST BP <80 MM HG: CPT | Performed by: FAMILY MEDICINE

## 2024-01-08 RX ORDER — LOSARTAN POTASSIUM 100 MG/1
100 TABLET ORAL DAILY
Qty: 90 TABLET | Refills: 1 | Status: SHIPPED | OUTPATIENT
Start: 2024-01-08

## 2024-01-08 RX ORDER — ATENOLOL 100 MG/1
100 TABLET ORAL DAILY
Qty: 90 TABLET | Refills: 1 | Status: SHIPPED | OUTPATIENT
Start: 2024-01-08

## 2024-01-08 ASSESSMENT — PATIENT HEALTH QUESTIONNAIRE - PHQ9
SUM OF ALL RESPONSES TO PHQ QUESTIONS 1-9: 0
1. LITTLE INTEREST OR PLEASURE IN DOING THINGS: 0
SUM OF ALL RESPONSES TO PHQ9 QUESTIONS 1 & 2: 0
SUM OF ALL RESPONSES TO PHQ QUESTIONS 1-9: 0
SUM OF ALL RESPONSES TO PHQ QUESTIONS 1-9: 0
2. FEELING DOWN, DEPRESSED OR HOPELESS: 0
SUM OF ALL RESPONSES TO PHQ QUESTIONS 1-9: 0

## 2024-01-08 NOTE — PATIENT INSTRUCTIONS
BRING YOUR MEDICATION BOTTLES TO ALL APPOINTMENTS    Check MY CHART for test results.  If you have forgotten your password, call [phone].    The diagnoses and medications listed in this after visit summary may not be up to date.  Check MY CHART in 28-48 hours for corrections.      Late cancellation policy: So that we can better accommodate people who are sick, please give our office 24 hour notice for an appointment cancellation.      Missed appointments: Your care is very important to us.  It is important that you keep your scheduled appointments.   Multiple missed appointments will lead to a dismissal from the office.      Patients arriving late will be worked into the schedule as time permits, with patients arriving on time taken as scheduled. Late arriving patients are more than welcome to wait or reschedule their appointments.    Please allow 5-7 business days for paperwork to be processed.

## 2024-02-15 ENCOUNTER — HOSPITAL ENCOUNTER (OUTPATIENT)
Dept: INFUSION THERAPY | Age: 74
Discharge: HOME | End: 2024-02-15
Payer: MEDICARE

## 2024-02-15 DIAGNOSIS — C50.412 MALIGNANT NEOPLASM OF UPPER-OUTER QUADRANT OF LEFT BREAST IN FEMALE, ESTROGEN RECEPTOR NEGATIVE (HCC): Primary | ICD-10-CM

## 2024-02-15 DIAGNOSIS — Z17.1 MALIGNANT NEOPLASM OF UPPER-OUTER QUADRANT OF LEFT BREAST IN FEMALE, ESTROGEN RECEPTOR NEGATIVE (HCC): Primary | ICD-10-CM

## 2024-02-15 LAB
ALBUMIN SERPL-MCNC: 4.5 GM/DL (ref 3.4–5)
ALP BLD-CCNC: 61 IU/L (ref 40–129)
ALT SERPL-CCNC: 25 U/L (ref 10–40)
ANION GAP SERPL CALCULATED.3IONS-SCNC: 17 MMOL/L (ref 7–16)
AST SERPL-CCNC: 29 IU/L (ref 15–37)
BASOPHILS ABSOLUTE: 0.1 K/CU MM
BASOPHILS RELATIVE PERCENT: 0.9 % (ref 0–1)
BILIRUB SERPL-MCNC: 1.1 MG/DL (ref 0–1)
BUN SERPL-MCNC: 21 MG/DL (ref 6–23)
CALCIUM SERPL-MCNC: 10.3 MG/DL (ref 8.3–10.6)
CHLORIDE BLD-SCNC: 100 MMOL/L (ref 99–110)
CO2: 22 MMOL/L (ref 21–32)
CREAT SERPL-MCNC: 0.7 MG/DL (ref 0.6–1.1)
DIFFERENTIAL TYPE: ABNORMAL
EOSINOPHILS ABSOLUTE: 0.5 K/CU MM
EOSINOPHILS RELATIVE PERCENT: 6.6 % (ref 0–3)
GFR SERPL CREATININE-BSD FRML MDRD: >60 ML/MIN/1.73M2
GLUCOSE SERPL-MCNC: 79 MG/DL (ref 70–99)
HCT VFR BLD CALC: 39.4 % (ref 37–47)
HEMOGLOBIN: 13 GM/DL (ref 12.5–16)
LYMPHOCYTES ABSOLUTE: 1.9 K/CU MM
LYMPHOCYTES RELATIVE PERCENT: 25.7 % (ref 24–44)
MCH RBC QN AUTO: 30.8 PG (ref 27–31)
MCHC RBC AUTO-ENTMCNC: 33 % (ref 32–36)
MCV RBC AUTO: 93.4 FL (ref 78–100)
MONOCYTES ABSOLUTE: 0.7 K/CU MM
MONOCYTES RELATIVE PERCENT: 9.3 % (ref 0–4)
PDW BLD-RTO: 14.8 % (ref 11.7–14.9)
PLATELET # BLD: 267 K/CU MM (ref 140–440)
PMV BLD AUTO: 9.5 FL (ref 7.5–11.1)
POTASSIUM SERPL-SCNC: 4.4 MMOL/L (ref 3.5–5.1)
RBC # BLD: 4.22 M/CU MM (ref 4.2–5.4)
SEGMENTED NEUTROPHILS ABSOLUTE COUNT: 4.2 K/CU MM
SEGMENTED NEUTROPHILS RELATIVE PERCENT: 57.5 % (ref 36–66)
SODIUM BLD-SCNC: 139 MMOL/L (ref 135–145)
TOTAL PROTEIN: 7.3 GM/DL (ref 6.4–8.2)
WBC # BLD: 7.4 K/CU MM (ref 4–10.5)

## 2024-02-15 PROCEDURE — 6360000002 HC RX W HCPCS: Performed by: INTERNAL MEDICINE

## 2024-02-15 PROCEDURE — 80053 COMPREHEN METABOLIC PANEL: CPT

## 2024-02-15 PROCEDURE — 2580000003 HC RX 258: Performed by: INTERNAL MEDICINE

## 2024-02-15 PROCEDURE — 36591 DRAW BLOOD OFF VENOUS DEVICE: CPT

## 2024-02-15 PROCEDURE — 85025 COMPLETE CBC W/AUTO DIFF WBC: CPT

## 2024-02-15 RX ORDER — HEPARIN 100 UNIT/ML
500 SYRINGE INTRAVENOUS PRN
OUTPATIENT
Start: 2024-02-15

## 2024-02-15 RX ORDER — WATER 10 ML/10ML
2.2 INJECTION INTRAMUSCULAR; INTRAVENOUS; SUBCUTANEOUS ONCE
OUTPATIENT
Start: 2024-02-15 | End: 2024-02-15

## 2024-02-15 RX ORDER — SODIUM CHLORIDE 0.9 % (FLUSH) 0.9 %
20 SYRINGE (ML) INJECTION PRN
OUTPATIENT
Start: 2024-02-15

## 2024-02-15 RX ORDER — SODIUM CHLORIDE 0.9 % (FLUSH) 0.9 %
10 SYRINGE (ML) INJECTION PRN
OUTPATIENT
Start: 2024-02-15

## 2024-02-15 RX ORDER — HEPARIN 100 UNIT/ML
500 SYRINGE INTRAVENOUS PRN
Status: DISCONTINUED | OUTPATIENT
Start: 2024-02-15 | End: 2024-02-16 | Stop reason: HOSPADM

## 2024-02-15 RX ORDER — SODIUM CHLORIDE 0.9 % (FLUSH) 0.9 %
20 SYRINGE (ML) INJECTION PRN
Status: DISCONTINUED | OUTPATIENT
Start: 2024-02-15 | End: 2024-02-16 | Stop reason: HOSPADM

## 2024-02-15 RX ADMIN — HEPARIN 500 UNITS: 100 SYRINGE at 14:44

## 2024-02-15 RX ADMIN — SODIUM CHLORIDE, PRESERVATIVE FREE 20 ML: 5 INJECTION INTRAVENOUS at 14:44

## 2024-02-15 NOTE — PROGRESS NOTES
Pt here for port draw. Port accessed by Teresa BISWAS with blood return noted. Flushed with 10 cc NS and 10 cc blood wasted. Labs drawn and sent. Port then flushed with 10 cc NS and 500 units of heparin and de-accessed. Pt left ambulatory discharge instructions given

## 2024-02-18 NOTE — PROGRESS NOTES
Patient Name: Veronica Garcia  Patient : 1950  Patient MRN: [id number]     Primary Oncologist: Isra Potts MD  Referring Provider: Beatris Kay MD     Date of Service: 2024      Chief Complaint:   Chief Complaint   Patient presents with    Follow-up     Patient Active Problem List:     Malignant neoplasm of female breast      Postoperative nausea     Neutropenic fever      Alopecia (capitis) totalis    HPI:   Ms. Garcia is a 73-year-old very pleasant female with medical history significant for hypertension, hyperlipidemia, coronary artery disease, initially referred to me on 2020 for evaluation of his clinical stage IIA left breast, high grade, triple negative, invasive ductal carcinoma.     She stated that she has echocardiogram and stress test on 2020. It showed normal EF (EF 51%). Concentric left ventricular hypertrophy and she has mild to moderate mitral regurfitation. She was incidentally noted to have left breast mass.     She was subsequently evaluated by Dr. Kay and she requested diagnostic mammogram. She underwent diagnostic digital bilateral mammogram on 2020 and it showed dumbbell-shaped mass at 2-3 o'clock at middle to posterior depth with associated pleomorphic calcifications, in her left breast. Targeted ultrasound at 2 o'clock at a distance of 7 cm from the nipple demonstrates a dumbbell-shaped hypoechoic mass with angular margins that measures 3.3 x 2.5 x 2.1 cm.  Internal vascularity noted along with some posterior acoustic shadowing.  No suspicious axillary lymph nodes.    She underwent ultrasound guided core biopsy of left breast and clip marker placement on 2020 and final pathology showed invasive ductal carcinoma. Tumor size is at least 12 mm and it is a high grade tumor. ER by IHC is negative (0%), PgR by IHC is negative (0%), Her2 by IHC is equivocal (score 2+) and Her2 by FISH is negative.     Since she is found to have clinical

## 2024-02-22 ENCOUNTER — HOSPITAL ENCOUNTER (OUTPATIENT)
Dept: INFUSION THERAPY | Age: 74
Discharge: HOME | End: 2024-02-22
Payer: MEDICARE

## 2024-02-22 ENCOUNTER — OFFICE VISIT (OUTPATIENT)
Dept: ONCOLOGY | Age: 74
End: 2024-02-22
Payer: MEDICARE

## 2024-02-22 VITALS
BODY MASS INDEX: 31.92 KG/M2 | TEMPERATURE: 96.6 F | WEIGHT: 187 LBS | HEIGHT: 64 IN | SYSTOLIC BLOOD PRESSURE: 158 MMHG | OXYGEN SATURATION: 98 % | DIASTOLIC BLOOD PRESSURE: 71 MMHG | HEART RATE: 61 BPM

## 2024-02-22 DIAGNOSIS — Z17.1 MALIGNANT NEOPLASM OF UPPER-OUTER QUADRANT OF LEFT BREAST IN FEMALE, ESTROGEN RECEPTOR NEGATIVE (HCC): Primary | ICD-10-CM

## 2024-02-22 DIAGNOSIS — C50.412 MALIGNANT NEOPLASM OF UPPER-OUTER QUADRANT OF LEFT BREAST IN FEMALE, ESTROGEN RECEPTOR NEGATIVE (HCC): Primary | ICD-10-CM

## 2024-02-22 PROCEDURE — 99213 OFFICE O/P EST LOW 20 MIN: CPT | Performed by: INTERNAL MEDICINE

## 2024-02-22 PROCEDURE — 3077F SYST BP >= 140 MM HG: CPT | Performed by: INTERNAL MEDICINE

## 2024-02-22 PROCEDURE — 1123F ACP DISCUSS/DSCN MKR DOCD: CPT | Performed by: INTERNAL MEDICINE

## 2024-02-22 PROCEDURE — 3078F DIAST BP <80 MM HG: CPT | Performed by: INTERNAL MEDICINE

## 2024-02-22 PROCEDURE — 99211 OFF/OP EST MAY X REQ PHY/QHP: CPT

## 2024-02-22 NOTE — PROGRESS NOTES
MA Rooming Questions  Patient: Veronica Garcia  MRN: [id number]    Date: 2/22/2024        1. Do you have any new issues?   yes - Neuropathy is getting worse in hands and feet         2. Do you need any refills on medications?    no    3. Have you had any imaging done since your last visit?   yes - mammogram 10/2, xray 8/15    4. Have you been hospitalized or seen in the emergency room since your last visit here?   no    5. Did the patient have a depression screening completed today? No    No data recorded     PHQ-9 Given to (if applicable):               PHQ-9 Score (if applicable):                     [] Positive     []  Negative              Does question #9 need addressed (if applicable)                     [] Yes    []  No               Shannon Gallardo CMA

## 2024-05-23 ENCOUNTER — HOSPITAL ENCOUNTER (OUTPATIENT)
Dept: INFUSION THERAPY | Age: 74
Discharge: HOME | End: 2024-05-23
Payer: MEDICARE

## 2024-05-23 PROCEDURE — 96523 IRRIG DRUG DELIVERY DEVICE: CPT

## 2024-07-01 NOTE — PROGRESS NOTES
Patient ID: Vreonica Garcia 1950    .  Chief Complaint   Patient presents with    Hypertension         Hypertension  This is a chronic problem. The current episode started more than 1 year ago. The problem has been gradually improving since onset. The problem is controlled. Risk factors for coronary artery disease include post-menopausal state and obesity. Past treatments include angiotensin blockers. There are no compliance problems.          Patient Active Problem List   Diagnosis    Essential hypertension    Hyperlipidemia    History of colon polyps    Obesity (BMI 30.0-34.9)    Osteopenia of multiple sites    Coronary artery disease involving native coronary artery of native heart without angina pectoris    Hepatic steatosis    Malignant neoplasm of upper-outer quadrant of left breast in female, estrogen receptor negative (HCC)    Postoperative nausea    Alopecia (capitis) totalis    Diarrhea due to drug    Chemotherapy-induced neuropathy (HCC)    LBBB (left bundle branch block)    Angina pectoris, unspecified    Atherosclerotic heart disease of native coronary artery with unspecified angina pectoris       Past Surgical History:   Procedure Laterality Date    BREAST BIOPSY Left 2020    BREAST BIOPSY Left 2020    sentinal node, partial mastectomy left    BREAST LUMPECTOMY Left 3/20/2020    LEFT BREAST LUMPECTOMY WITH NEEDLE LOC AND SENTINEL NODE BIOPSY performed by Leatha Brooks MD at [facility] OR    BREAST SURGERY Left 3/30/2020    BREAST LESION RE EXCISION LEFT LUMPECTOMY SITE performed by Leatha Brooks MD at [facility] OR    CARDIOVASCULAR STRESS TEST  2009    treadmill, Dr. Galindo    CARPAL TUNNEL RELEASE Right     right     SECTION      COLONOSCOPY          COLONOSCOPY  2015    diverticulosis, internal hemorroids. repeat 5 years. /Kandy    PORT SURGERY Right 2020    PORT SURGERY Right 3/20/2020    PORT INSERTION performed by Leatha STREET

## 2024-07-02 ENCOUNTER — OFFICE VISIT (OUTPATIENT)
Dept: FAMILY MEDICINE CLINIC | Age: 74
End: 2024-07-02

## 2024-07-02 VITALS
HEART RATE: 57 BPM | WEIGHT: 188.8 LBS | BODY MASS INDEX: 32.41 KG/M2 | DIASTOLIC BLOOD PRESSURE: 70 MMHG | SYSTOLIC BLOOD PRESSURE: 138 MMHG | OXYGEN SATURATION: 98 %

## 2024-07-02 DIAGNOSIS — M85.89 OSTEOPENIA OF MULTIPLE SITES: ICD-10-CM

## 2024-07-02 DIAGNOSIS — I25.10 CORONARY ARTERY DISEASE INVOLVING NATIVE CORONARY ARTERY OF NATIVE HEART WITHOUT ANGINA PECTORIS: ICD-10-CM

## 2024-07-02 DIAGNOSIS — I10 ESSENTIAL HYPERTENSION: ICD-10-CM

## 2024-07-02 DIAGNOSIS — I25.119 ATHEROSCLEROSIS OF NATIVE CORONARY ARTERY OF NATIVE HEART WITH ANGINA PECTORIS (HCC): ICD-10-CM

## 2024-07-02 RX ORDER — LOSARTAN POTASSIUM 100 MG/1
100 TABLET ORAL DAILY
Qty: 90 TABLET | Refills: 1 | Status: SHIPPED | OUTPATIENT
Start: 2024-07-02

## 2024-07-02 RX ORDER — ATENOLOL 100 MG/1
100 TABLET ORAL DAILY
Qty: 90 TABLET | Refills: 1 | Status: SHIPPED | OUTPATIENT
Start: 2024-07-02

## 2024-07-02 RX ORDER — CAL/D3/MAG11/ZINC/COP/MANG/BOR 600 MG-800
1 TABLET ORAL 2 TIMES DAILY
Qty: 180 TABLET | Refills: 3 | Status: SHIPPED | OUTPATIENT
Start: 2024-07-02

## 2024-07-02 RX ORDER — ATORVASTATIN CALCIUM 40 MG/1
40 TABLET, FILM COATED ORAL DAILY
Qty: 90 TABLET | Refills: 3 | Status: SHIPPED | OUTPATIENT
Start: 2024-07-02

## 2024-07-02 RX ORDER — ASPIRIN 81 MG/1
81 TABLET ORAL DAILY
Qty: 90 TABLET | Refills: 3 | Status: SHIPPED | OUTPATIENT
Start: 2024-07-02

## 2024-07-02 NOTE — PATIENT INSTRUCTIONS
NEW OFFICE HOURS: M-TH 7AM-5PM  BRING YOUR MEDICATION BOTTLES TO ALL APPOINTMENTS    Check MY CHART for test results.  If you have forgotten your password, call [phone].  The diagnoses and medications listed in this after visit summary may not be up to date.  Check MY CHART in 28-48 hours for corrections.      Late cancellation policy: So that we can better accommodate people who are sick, please give our office 24 hour notice for an appointment cancellation.    Missed appointments: Your care is very important to us.  It is important that you keep your scheduled appointments.   Multiple missed appointments will lead to a dismissal from the office.    Patients arriving late will be worked into the schedule as time permits, with patients arriving on time taken as scheduled. Late arriving patients are more than welcome to wait or reschedule their appointments.    Please allow 5-7 business days for paperwork to be processed.

## 2024-08-23 ENCOUNTER — HOSPITAL ENCOUNTER (OUTPATIENT)
Dept: INFUSION THERAPY | Age: 74
Discharge: HOME | End: 2024-08-23
Payer: MEDICARE

## 2024-08-23 ENCOUNTER — OFFICE VISIT (OUTPATIENT)
Dept: ONCOLOGY | Age: 74
End: 2024-08-23
Payer: MEDICARE

## 2024-08-23 VITALS
HEART RATE: 57 BPM | BODY MASS INDEX: 31.92 KG/M2 | OXYGEN SATURATION: 97 % | RESPIRATION RATE: 16 BRPM | WEIGHT: 187 LBS | TEMPERATURE: 97.3 F | SYSTOLIC BLOOD PRESSURE: 168 MMHG | HEIGHT: 64 IN | DIASTOLIC BLOOD PRESSURE: 68 MMHG

## 2024-08-23 VITALS
SYSTOLIC BLOOD PRESSURE: 168 MMHG | WEIGHT: 187.8 LBS | OXYGEN SATURATION: 97 % | BODY MASS INDEX: 32.24 KG/M2 | HEART RATE: 57 BPM | DIASTOLIC BLOOD PRESSURE: 68 MMHG | TEMPERATURE: 97.3 F

## 2024-08-23 DIAGNOSIS — C50.412 MALIGNANT NEOPLASM OF UPPER-OUTER QUADRANT OF LEFT BREAST IN FEMALE, ESTROGEN RECEPTOR NEGATIVE (HCC): Primary | ICD-10-CM

## 2024-08-23 DIAGNOSIS — Z17.1 MALIGNANT NEOPLASM OF UPPER-OUTER QUADRANT OF LEFT BREAST IN FEMALE, ESTROGEN RECEPTOR NEGATIVE (HCC): Primary | ICD-10-CM

## 2024-08-23 LAB
ALBUMIN SERPL-MCNC: 4.4 GM/DL (ref 3.4–5)
ALP BLD-CCNC: 58 IU/L (ref 40–128)
ALT SERPL-CCNC: 24 U/L (ref 10–40)
ANION GAP SERPL CALCULATED.3IONS-SCNC: 14 MMOL/L (ref 7–16)
AST SERPL-CCNC: 28 IU/L (ref 15–37)
BASOPHILS ABSOLUTE: 0.1 K/CU MM
BASOPHILS RELATIVE PERCENT: 1 % (ref 0–1)
BILIRUB SERPL-MCNC: 1.5 MG/DL (ref 0–1)
BUN SERPL-MCNC: 23 MG/DL (ref 6–23)
CALCIUM SERPL-MCNC: 9.8 MG/DL (ref 8.3–10.6)
CHLORIDE BLD-SCNC: 103 MMOL/L (ref 99–110)
CO2: 25 MMOL/L (ref 21–32)
CREAT SERPL-MCNC: 0.8 MG/DL (ref 0.6–1.1)
DIFFERENTIAL TYPE: ABNORMAL
EOSINOPHILS ABSOLUTE: 0.6 K/CU MM
EOSINOPHILS RELATIVE PERCENT: 7.3 % (ref 0–3)
GFR, ESTIMATED: 77 ML/MIN/1.73M2
GLUCOSE SERPL-MCNC: 86 MG/DL (ref 70–99)
HCT VFR BLD CALC: 38.4 % (ref 37–47)
HEMOGLOBIN: 12.7 GM/DL (ref 12.5–16)
LYMPHOCYTES ABSOLUTE: 1.8 K/CU MM
LYMPHOCYTES RELATIVE PERCENT: 23.5 % (ref 24–44)
MCH RBC QN AUTO: 31.6 PG (ref 27–31)
MCHC RBC AUTO-ENTMCNC: 33.1 % (ref 32–36)
MCV RBC AUTO: 95.5 FL (ref 78–100)
MONOCYTES ABSOLUTE: 0.8 K/CU MM
MONOCYTES RELATIVE PERCENT: 9.8 % (ref 0–4)
NEUTROPHILS ABSOLUTE: 4.5 K/CU MM
NEUTROPHILS RELATIVE PERCENT: 58.4 % (ref 36–66)
PDW BLD-RTO: 14.5 % (ref 11.7–14.9)
PLATELET # BLD: 257 K/CU MM (ref 140–440)
PMV BLD AUTO: 9.6 FL (ref 7.5–11.1)
POTASSIUM SERPL-SCNC: 4.5 MMOL/L (ref 3.5–5.1)
RBC # BLD: 4.02 M/CU MM (ref 4.2–5.4)
SODIUM BLD-SCNC: 142 MMOL/L (ref 135–145)
TOTAL PROTEIN: 7.5 GM/DL (ref 6.4–8.2)
WBC # BLD: 7.7 K/CU MM (ref 4–10.5)

## 2024-08-23 PROCEDURE — 3077F SYST BP >= 140 MM HG: CPT | Performed by: INTERNAL MEDICINE

## 2024-08-23 PROCEDURE — 36591 DRAW BLOOD OFF VENOUS DEVICE: CPT

## 2024-08-23 PROCEDURE — 1123F ACP DISCUSS/DSCN MKR DOCD: CPT | Performed by: INTERNAL MEDICINE

## 2024-08-23 PROCEDURE — 2580000003 HC RX 258: Performed by: INTERNAL MEDICINE

## 2024-08-23 PROCEDURE — 3078F DIAST BP <80 MM HG: CPT | Performed by: INTERNAL MEDICINE

## 2024-08-23 PROCEDURE — 80053 COMPREHEN METABOLIC PANEL: CPT

## 2024-08-23 PROCEDURE — 99213 OFFICE O/P EST LOW 20 MIN: CPT | Performed by: INTERNAL MEDICINE

## 2024-08-23 PROCEDURE — 85025 COMPLETE CBC W/AUTO DIFF WBC: CPT

## 2024-08-23 RX ORDER — WATER 10 ML/10ML
2.2 INJECTION INTRAMUSCULAR; INTRAVENOUS; SUBCUTANEOUS ONCE
OUTPATIENT
Start: 2024-08-23 | End: 2024-08-23

## 2024-08-23 RX ORDER — SODIUM CHLORIDE 0.9 % (FLUSH) 0.9 %
20 SYRINGE (ML) INJECTION PRN
OUTPATIENT
Start: 2024-08-23

## 2024-08-23 RX ORDER — SODIUM CHLORIDE 0.9 % (FLUSH) 0.9 %
10 SYRINGE (ML) INJECTION PRN
OUTPATIENT
Start: 2024-08-23

## 2024-08-23 RX ORDER — HEPARIN 100 UNIT/ML
500 SYRINGE INTRAVENOUS PRN
OUTPATIENT
Start: 2024-08-23

## 2024-08-23 RX ORDER — SODIUM CHLORIDE 0.9 % (FLUSH) 0.9 %
20 SYRINGE (ML) INJECTION PRN
Status: DISCONTINUED | OUTPATIENT
Start: 2024-08-23 | End: 2024-08-24 | Stop reason: HOSPADM

## 2024-08-23 RX ADMIN — SODIUM CHLORIDE, PRESERVATIVE FREE 20 ML: 5 INJECTION INTRAVENOUS at 09:44

## 2024-08-23 ASSESSMENT — PATIENT HEALTH QUESTIONNAIRE - PHQ9
SUM OF ALL RESPONSES TO PHQ QUESTIONS 1-9: 0
SUM OF ALL RESPONSES TO PHQ QUESTIONS 1-9: 0
1. LITTLE INTEREST OR PLEASURE IN DOING THINGS: NOT AT ALL
SUM OF ALL RESPONSES TO PHQ QUESTIONS 1-9: 0
SUM OF ALL RESPONSES TO PHQ QUESTIONS 1-9: 0
2. FEELING DOWN, DEPRESSED OR HOPELESS: NOT AT ALL
SUM OF ALL RESPONSES TO PHQ9 QUESTIONS 1 & 2: 0

## 2024-08-23 NOTE — PROGRESS NOTES
Patient Name: Veronica Garcia  Patient : 1950  Patient MRN: [id number]     Primary Oncologist: Isra Potts MD  Referring Provider: Beatris Kay MD     Date of Service: 2024      Chief Complaint:   Chief Complaint   Patient presents with    Follow-up     Patient Active Problem List:     Malignant neoplasm of female breast      Postoperative nausea     Neutropenic fever      Alopecia (capitis) totalis    HPI:   Ms. Garcia is a 73-year-old very pleasant female with medical history significant for hypertension, hyperlipidemia, coronary artery disease, initially referred to me on 2020 for evaluation of his clinical stage IIA left breast, high grade, triple negative, invasive ductal carcinoma.     She stated that she has echocardiogram and stress test on 2020. It showed normal EF (EF 51%). Concentric left ventricular hypertrophy and she has mild to moderate mitral regurfitation. She was incidentally noted to have left breast mass.     She was subsequently evaluated by Dr. Kay and she requested diagnostic mammogram. She underwent diagnostic digital bilateral mammogram on 2020 and it showed dumbbell-shaped mass at 2-3 o'clock at middle to posterior depth with associated pleomorphic calcifications, in her left breast. Targeted ultrasound at 2 o'clock at a distance of 7 cm from the nipple demonstrates a dumbbell-shaped hypoechoic mass with angular margins that measures 3.3 x 2.5 x 2.1 cm.  Internal vascularity noted along with some posterior acoustic shadowing.  No suspicious axillary lymph nodes.    She underwent ultrasound guided core biopsy of left breast and clip marker placement on 2020 and final pathology showed invasive ductal carcinoma. Tumor size is at least 12 mm and it is a high grade tumor. ER by IHC is negative (0%), PgR by IHC is negative (0%), Her2 by IHC is equivocal (score 2+) and Her2 by FISH is negative.     Since she is found to have clinical

## 2024-08-23 NOTE — PROGRESS NOTES
Pt here for port flush after OV. Port accessed with blood return noted. Flushed with 10 cc NS and 10 cc blood wasted. Labs drawn and sent. Port then flushed with 20 cc NS and de-accessed. Pt left ambulatory instructed to stop at check out desk for next OV and discharge paperwork.

## 2024-08-23 NOTE — PROGRESS NOTES
MA Rooming Questions  Patient: Veronica Garcia  MRN: [id number]    Date: 8/23/2024        1. Do you have any new issues?   yes - occ pain side of L breast         2. Do you need any refills on medications?    no    3. Have you had any imaging done since your last visit?   no    4. Have you been hospitalized or seen in the emergency room since your last visit here?   no    5. Did the patient have a depression screening completed today? Yes    No data recorded     PHQ-9 Given to (if applicable):               PHQ-9 Score (if applicable):                     [] Positive     []  Negative              Does question #9 need addressed (if applicable)                     [] Yes    []  No               Tammi West MA

## 2024-08-27 NOTE — PROGRESS NOTES
IR Procedure at [facility]: Spoke with patient and she will arrive at 1030 at [facility] on 9/4/2024 for her procedure at 1130 also went over below instructions and told patient to have someone drive her that day.     NPO at Midnight      Follow your directions as prescribed by the doctor for your procedure and medications.  3.   Consult your provider as to when to stop blood thinner  4.   Do not take any pain medication within 6 hours of your procedure  5.   Do not drink any alcoholic beverages or use any street drugs 24 hours before procedure.  6.   Please wear simple, loose fitting clothing to the hospital.  Do not bring valuables (money,             credit cards, checkbooks, etc.)     7.   If you  have a Living Will and Durable Power of  for Healthcare, please bring in a copy.  8.   Please bring picture ID,  insurance card, paperwork from the doctors office            (H & P, Consent,  & card for implantable devices).  9.   Report to the information desk on the ground floor.  10. Take a shower the night before or morning of your procedure, do not apply any lotion, oil or powder.

## 2024-09-04 ENCOUNTER — HOSPITAL ENCOUNTER (OUTPATIENT)
Dept: INTERVENTIONAL RADIOLOGY/VASCULAR | Age: 74
Discharge: HOME | End: 2024-09-04
Payer: MEDICARE

## 2024-09-04 VITALS
DIASTOLIC BLOOD PRESSURE: 70 MMHG | TEMPERATURE: 97.7 F | WEIGHT: 183 LBS | BODY MASS INDEX: 31.24 KG/M2 | OXYGEN SATURATION: 97 % | SYSTOLIC BLOOD PRESSURE: 161 MMHG | HEIGHT: 64 IN | HEART RATE: 60 BPM

## 2024-09-04 DIAGNOSIS — C50.412 CARCINOMA OF UPPER-OUTER QUADRANT OF FEMALE BREAST, LEFT (HCC): ICD-10-CM

## 2024-09-04 LAB
APTT: 28 SECONDS (ref 25.1–37.1)
INR BLD: 0.9 INDEX
PROTHROMBIN TIME: 12.8 SECONDS (ref 11.7–14.5)

## 2024-09-04 PROCEDURE — 85610 PROTHROMBIN TIME: CPT

## 2024-09-04 PROCEDURE — 85730 THROMBOPLASTIN TIME PARTIAL: CPT

## 2024-09-04 PROCEDURE — 77001 FLUOROGUIDE FOR VEIN DEVICE: CPT

## 2024-09-04 PROCEDURE — 2709999900 IR REMOVE TUNNELED CVAD W SQ PORT/PUMP INSERT

## 2024-09-04 PROCEDURE — 36590 REMOVAL TUNNELED CV CATH: CPT

## 2024-09-04 PROCEDURE — 2500000003 HC RX 250 WO HCPCS: Performed by: RADIOLOGY

## 2024-09-04 PROCEDURE — 7100000011 HC PHASE II RECOVERY - ADDTL 15 MIN

## 2024-09-04 PROCEDURE — 2500000003 HC RX 250 WO HCPCS

## 2024-09-04 PROCEDURE — 7100000010 HC PHASE II RECOVERY - FIRST 15 MIN

## 2024-09-04 RX ORDER — LIDOCAINE HYDROCHLORIDE 10 MG/ML
INJECTION, SOLUTION EPIDURAL; INFILTRATION; INTRACAUDAL; PERINEURAL PRN
Status: COMPLETED | OUTPATIENT
Start: 2024-09-04 | End: 2024-09-04

## 2024-09-04 RX ADMIN — LIDOCAINE HYDROCHLORIDE 10 ML: 10 INJECTION, SOLUTION EPIDURAL; INFILTRATION; INTRACAUDAL; PERINEURAL at 11:16

## 2024-09-04 ASSESSMENT — PAIN - FUNCTIONAL ASSESSMENT: PAIN_FUNCTIONAL_ASSESSMENT: NONE - DENIES PAIN

## 2024-09-04 NOTE — PROGRESS NOTES
Pt. returned from port removal procedure with Dr. Ashton. Tolerated well. She denies any pain or discomfort. No s/s of bleeding noted at site. Right upper chest dressing remains clean, dry, intact. Vitals are stable. Preparing for discharge soon. Discharge instructions reviewed with pt and  at bedside.

## 2024-09-04 NOTE — PROGRESS NOTES
TRANSFER - OUT REPORT: Port removal complete, pt tolerated procedure well, VSS, no bleeding noted at removal site.     Verbal report given to Rhonda Garcia (unit) for     Report consisted of patient's Situation, Background, Assessment and   Recommendations(SBAR).     Information from the following report(s) Nurse Handoff Report was reviewed with the receiving nurse.    Opportunity for questions and clarification was provided.      Patient transported with:   Registered Nurse

## 2024-09-04 NOTE — H&P
Consult Interventional Radiology    Date:2024  Name:Veronica Garcia   :1950   MR#:[id number]    SEX:female     Planned procedure:  port removal  Indication: not needed anymore    H&P Status: H&P was reviewed, the patient was examined and no change has occurred in patient's condition since H&P was completed.    Past Medical History:  Past Medical History:   Diagnosis Date    Breast cancer (HCC)     Colonic polyp     History of external beam radiation therapy     Left Breast 5,240 cGy per  at [facility]    History of therapeutic radiation     Hx antineoplastic chemo     Hx of motion sickness     Hyperlipidemia 2009    Hypertension     \"on medication 5+ yrs \" follow with dr Galindo    Old MI (myocardial infarction)     \"they said my ekg showed I  had heart attack over 6 yrs ago but never knew I had one\"    PONV (postoperative nausea and vomiting)     hx of motion sickness    Wears glasses         Past Surgical History:  Past Surgical History:   Procedure Laterality Date    BREAST BIOPSY Left 2020    BREAST BIOPSY Left 2020    sentinal node, partial mastectomy left    BREAST LUMPECTOMY Left 3/20/2020    LEFT BREAST LUMPECTOMY WITH NEEDLE LOC AND SENTINEL NODE BIOPSY performed by Leatha Brooks MD at [facility] OR    BREAST SURGERY Left 3/30/2020    BREAST LESION RE EXCISION LEFT LUMPECTOMY SITE performed by Leatha Brooks MD at [facility] OR    CARDIOVASCULAR STRESS TEST  2009    treadmill, Dr. Galindo    CARPAL TUNNEL RELEASE Right 's    right     SECTION      COLONOSCOPY          COLONOSCOPY  2015    diverticulosis, internal hemorroids. repeat 5 years. /Kandy    PORT SURGERY Right 2020    PORT SURGERY Right 3/20/2020    PORT INSERTION performed by Leatha Brooks MD at [facility] OR    ROTATOR CUFF REPAIR Right 2016    TUBAL LIGATION      US BREAST BIOPSY W LOC DEVICE 1ST LESION LEFT Left 2021    US BREAST NEEDLE

## 2024-09-04 NOTE — PROGRESS NOTES
Patient admitted to IR holding.  IV started.  Labs are not required per Dr. Ashton.  Assessment completed.

## 2024-09-04 NOTE — BRIEF OP NOTE
Brief Postoperative Note      Patient: Veronica Garcia  YOB: 1950  MRN: [id number]    Date of Procedure: 9/4/2024    * No Diagnosis Codes entered *    Post-Op Diagnosis: Same       * No procedures listed *    * No surgeons listed *    Assistant:  * No surgical staff found *    Anesthesia: * No anesthesia type entered *    Estimated Blood Loss (mL): none    Complications: None    Specimens:   * No specimens in log *    Implants:  * No implants in log *      Drains: * No LDAs found *    Findings:  Infection Present At Time Of Surgery (PATOS) (choose all levels that have infection present):  No infection present  Other Findings: none    Electronically signed by Vashti Ashton MD on 9/4/2024 at 11:46 AM

## 2024-10-15 DIAGNOSIS — C50.412 MALIGNANT NEOPLASM OF UPPER-OUTER QUADRANT OF LEFT BREAST IN FEMALE, ESTROGEN RECEPTOR NEGATIVE (HCC): Primary | ICD-10-CM

## 2024-10-15 DIAGNOSIS — Z17.1 MALIGNANT NEOPLASM OF UPPER-OUTER QUADRANT OF LEFT BREAST IN FEMALE, ESTROGEN RECEPTOR NEGATIVE (HCC): Primary | ICD-10-CM

## 2024-10-16 DIAGNOSIS — Z78.0 MENOPAUSE: Primary | ICD-10-CM

## 2024-10-16 NOTE — PROGRESS NOTES
Patient at [facility] today getting dexa bone density.  Per Dr. Potts - new order with appropriate code placed.

## 2024-10-18 ENCOUNTER — HOSPITAL ENCOUNTER (OUTPATIENT)
Dept: WOMENS IMAGING | Age: 74
Discharge: HOME | End: 2024-10-18
Payer: MEDICARE

## 2024-10-18 ENCOUNTER — HOSPITAL ENCOUNTER (OUTPATIENT)
Dept: ULTRASOUND IMAGING | Age: 74
End: 2024-10-18
Payer: MEDICARE

## 2024-10-18 DIAGNOSIS — C50.412 MALIGNANT NEOPLASM OF UPPER-OUTER QUADRANT OF LEFT BREAST IN FEMALE, ESTROGEN RECEPTOR NEGATIVE (HCC): ICD-10-CM

## 2024-10-18 DIAGNOSIS — Z17.1 MALIGNANT NEOPLASM OF UPPER-OUTER QUADRANT OF LEFT BREAST IN FEMALE, ESTROGEN RECEPTOR NEGATIVE (HCC): ICD-10-CM

## 2024-10-18 PROCEDURE — G0279 TOMOSYNTHESIS, MAMMO: HCPCS

## 2024-11-19 ENCOUNTER — HOSPITAL ENCOUNTER (OUTPATIENT)
Dept: WOMENS IMAGING | Age: 74
Discharge: HOME | End: 2024-11-19
Attending: INTERNAL MEDICINE
Payer: MEDICARE

## 2024-11-19 DIAGNOSIS — Z78.0 MENOPAUSE: ICD-10-CM

## 2024-11-19 PROCEDURE — 77080 DXA BONE DENSITY AXIAL: CPT

## 2025-01-09 ENCOUNTER — OFFICE VISIT (OUTPATIENT)
Dept: FAMILY MEDICINE CLINIC | Age: 75
End: 2025-01-09

## 2025-01-09 VITALS
BODY MASS INDEX: 32.78 KG/M2 | SYSTOLIC BLOOD PRESSURE: 124 MMHG | HEIGHT: 64 IN | RESPIRATION RATE: 15 BRPM | OXYGEN SATURATION: 96 % | WEIGHT: 192 LBS | HEART RATE: 60 BPM | DIASTOLIC BLOOD PRESSURE: 72 MMHG

## 2025-01-09 DIAGNOSIS — I10 ESSENTIAL HYPERTENSION: Primary | ICD-10-CM

## 2025-01-09 DIAGNOSIS — Z23 NEED FOR COVID-19 VACCINE: ICD-10-CM

## 2025-01-09 DIAGNOSIS — Z29.11 NEED FOR PROPHYLACTIC VACCINATION AND INOCULATION AGAINST RESPIRATORY SYNCYTIAL VIRUS (RSV): ICD-10-CM

## 2025-01-09 DIAGNOSIS — G62.0 CHEMOTHERAPY-INDUCED NEUROPATHY (HCC): ICD-10-CM

## 2025-01-09 DIAGNOSIS — T45.1X5A CHEMOTHERAPY-INDUCED NEUROPATHY (HCC): ICD-10-CM

## 2025-01-09 LAB
CREAT UR-MCNC: 89.2 MG/DL (ref 28–259)
MICROALBUMIN UR DL<=1MG/L-MCNC: 1.32 MG/DL
MICROALBUMIN/CREAT UR: 14.8 MG/G (ref 0–30)

## 2025-01-09 RX ORDER — LOSARTAN POTASSIUM 100 MG/1
100 TABLET ORAL DAILY
Qty: 90 TABLET | Refills: 1 | Status: SHIPPED | OUTPATIENT
Start: 2025-01-09

## 2025-01-09 RX ORDER — PREGABALIN 50 MG/1
50 CAPSULE ORAL 3 TIMES DAILY
Qty: 90 CAPSULE | Refills: 0 | Status: SHIPPED | OUTPATIENT
Start: 2025-01-09 | End: 2025-02-08

## 2025-01-09 RX ORDER — ATENOLOL 100 MG/1
100 TABLET ORAL DAILY
Qty: 90 TABLET | Refills: 1 | Status: SHIPPED | OUTPATIENT
Start: 2025-01-09

## 2025-01-09 SDOH — ECONOMIC STABILITY: FOOD INSECURITY: WITHIN THE PAST 12 MONTHS, YOU WORRIED THAT YOUR FOOD WOULD RUN OUT BEFORE YOU GOT MONEY TO BUY MORE.: NEVER TRUE

## 2025-01-09 SDOH — ECONOMIC STABILITY: FOOD INSECURITY: WITHIN THE PAST 12 MONTHS, THE FOOD YOU BOUGHT JUST DIDN'T LAST AND YOU DIDN'T HAVE MONEY TO GET MORE.: NEVER TRUE

## 2025-01-09 ASSESSMENT — PATIENT HEALTH QUESTIONNAIRE - PHQ9
SUM OF ALL RESPONSES TO PHQ QUESTIONS 1-9: 0
1. LITTLE INTEREST OR PLEASURE IN DOING THINGS: NOT AT ALL
SUM OF ALL RESPONSES TO PHQ9 QUESTIONS 1 & 2: 0
2. FEELING DOWN, DEPRESSED OR HOPELESS: NOT AT ALL
SUM OF ALL RESPONSES TO PHQ QUESTIONS 1-9: 0

## 2025-01-09 NOTE — PROGRESS NOTES
Right 's    right     SECTION      COLONOSCOPY          COLONOSCOPY  2015    diverticulosis, internal hemorroids. repeat 5 years. /Kandy    PORT SURGERY Right 2020    PORT SURGERY Right 3/20/2020    PORT INSERTION performed by Leatha Brooks MD at [facility] OR    ROTATOR CUFF REPAIR Right 2016    TUBAL LIGATION      US BREAST BIOPSY W LOC DEVICE 1ST LESION LEFT Left 2021    US BREAST NEEDLE BIOPSY LEFT 2021 Michelle Cuevas MD [facility]       Family History   Problem Relation Age of Onset    High Blood Pressure Mother     Stroke Father     Coronary Art Dis Father     High Blood Pressure Father     High Blood Pressure Sister     Heart Disease Brother     Heart Attack Brother 65    COPD Brother     Ovarian Cancer Neg Hx        Current Outpatient Medications on File Prior to Visit   Medication Sig Dispense Refill    atorvastatin (LIPITOR) 40 MG tablet Take 1 tablet by mouth daily To replace the 10 mg 90 tablet 3    Caltrate 600+D Plus Minerals (CALTRATE) 600-800 MG-UNIT TABS tablet Take 1 tablet by mouth 2 times daily 180 tablet 3    aspirin 81 MG EC tablet Take 1 tablet by mouth daily 90 tablet 3    benzoyl peroxide 10 % gel Apply topically every morning Apply topically daily. 42.5 g 0    nitroGLYCERIN (NITROSTAT) 0.4 MG SL tablet Place 1 tablet under the tongue every 5 minutes as needed for Chest pain up to max of 3 total doses. If no relief after 1 dose, call 911. 25 tablet 1    ketoconazole (NIZORAL) 2 % cream Apply topically 2 times daily 30 g 0    Omega-3 Fatty Acids (FISH OIL PO) Take 1 tablet by mouth daily      Probiotic Product (ALIGN PO) Take 1 tablet by mouth daily      vitamin B-12 (CYANOCOBALAMIN) 1000 MCG tablet Take 1 tablet by mouth daily      clobetasol (OLUX) 0.05 % foam       ketoconazole (NIZORAL) 2 % shampoo       Multiple Vitamin (MULTIVITAMIN PO) Take  by mouth daily.        aspirin EC 81 MG EC tablet Take 1 tablet by mouth

## 2025-02-10 ENCOUNTER — OFFICE VISIT (OUTPATIENT)
Dept: FAMILY MEDICINE CLINIC | Age: 75
End: 2025-02-10

## 2025-02-10 VITALS
WEIGHT: 196 LBS | HEIGHT: 64 IN | OXYGEN SATURATION: 97 % | DIASTOLIC BLOOD PRESSURE: 64 MMHG | HEART RATE: 62 BPM | SYSTOLIC BLOOD PRESSURE: 106 MMHG | BODY MASS INDEX: 33.46 KG/M2

## 2025-02-10 DIAGNOSIS — Z00.00 MEDICARE ANNUAL WELLNESS VISIT, SUBSEQUENT: Primary | ICD-10-CM

## 2025-02-10 DIAGNOSIS — T45.1X5A CHEMOTHERAPY-INDUCED NEUROPATHY (HCC): Primary | ICD-10-CM

## 2025-02-10 DIAGNOSIS — G62.0 CHEMOTHERAPY-INDUCED NEUROPATHY (HCC): Primary | ICD-10-CM

## 2025-02-10 SDOH — ECONOMIC STABILITY: FOOD INSECURITY: WITHIN THE PAST 12 MONTHS, YOU WORRIED THAT YOUR FOOD WOULD RUN OUT BEFORE YOU GOT MONEY TO BUY MORE.: NEVER TRUE

## 2025-02-10 SDOH — ECONOMIC STABILITY: FOOD INSECURITY: WITHIN THE PAST 12 MONTHS, THE FOOD YOU BOUGHT JUST DIDN'T LAST AND YOU DIDN'T HAVE MONEY TO GET MORE.: NEVER TRUE

## 2025-02-10 ASSESSMENT — VISUAL ACUITY
OS_CC: 20/20
OD_CC: 20/20

## 2025-02-10 ASSESSMENT — PATIENT HEALTH QUESTIONNAIRE - PHQ9
1. LITTLE INTEREST OR PLEASURE IN DOING THINGS: NOT AT ALL
SUM OF ALL RESPONSES TO PHQ QUESTIONS 1-9: 0
SUM OF ALL RESPONSES TO PHQ QUESTIONS 1-9: 0
2. FEELING DOWN, DEPRESSED OR HOPELESS: NOT AT ALL
SUM OF ALL RESPONSES TO PHQ9 QUESTIONS 1 & 2: 0
SUM OF ALL RESPONSES TO PHQ QUESTIONS 1-9: 0
SUM OF ALL RESPONSES TO PHQ QUESTIONS 1-9: 0

## 2025-02-10 NOTE — PROGRESS NOTES
Patient ID: Veronica Garcia 1950    Chief Complaint   Patient presents with    Peripheral Neuropathy       HPI     History of Present Illness  The patient is a 74-year-old female who presents for a follow-up of peripheral neuropathy.    Peripheral Neuropathy  - She has been prescribed Lyrica for her condition but has only been taking one tablet at night due to experiencing an unusual sensation in her mouth.  - She has not yet increased the dosage to two tablets.  - The efficacy of the medication remains uncertain to her as she continues to experience discomfort in her legs, feet, and hands, which she attributes to her chemotherapy-induced neuropathy.  - She reports that she does not take much medicine.    Annual Wellness Visit  - She is also due for an annual wellness visit.    Supplemental information: Her current medication regimen includes antihypertensive drugs and Tylenol for headache management.    MEDICATIONS  Current: Lyrica, Tylenol      Patient Active Problem List   Diagnosis    Essential hypertension    Hyperlipidemia    History of colon polyps    Obesity (BMI 30.0-34.9)    Osteopenia of multiple sites    Coronary artery disease involving native coronary artery of native heart without angina pectoris    Hepatic steatosis    Malignant neoplasm of upper-outer quadrant of left breast in female, estrogen receptor negative (HCC)    Postoperative nausea    Alopecia (capitis) totalis    Diarrhea due to drug    Chemotherapy-induced neuropathy (HCC)    LBBB (left bundle branch block)    Angina pectoris, unspecified    Atherosclerotic heart disease of native coronary artery with unspecified angina pectoris       Past Surgical History:   Procedure Laterality Date    BREAST BIOPSY Left 02/25/2020    BREAST BIOPSY Left 03/20/2020    sentinal node, partial mastectomy left    BREAST LUMPECTOMY Left 3/20/2020    LEFT BREAST LUMPECTOMY WITH NEEDLE LOC AND SENTINEL NODE BIOPSY performed by Leatha Brooks MD at

## 2025-02-10 NOTE — PROGRESS NOTES
Medicare Annual Wellness Visit    Veronica Garcia is here for Medicare AWV    Assessment & Plan   Medicare annual wellness visit, subsequent       Return in about 1 year (around 2/10/2026) for (AWV LPN.     Subjective       Patient's complete Health Risk Assessment and screening values have been reviewed and are found in Flowsheets. The following problems were reviewed today and where indicated follow up appointments were made and/or referrals ordered.    Positive Risk Factor Screenings with Interventions:                Abnormal BMI (obese):  There is no height or weight on file to calculate BMI. (!) Abnormal  Interventions:  More fruits and vegies in diet        Dentist Screen:  Have you seen the dentist within the past year?: (!) No    Intervention:  Advised to schedule with their dentist    Hearing Screen:  Do you or your family notice any trouble with your hearing that hasn't been managed with hearing aids?: (!) Yes    Interventions:  None needed.  Hearing is intact       Advanced Directives:  Do you have a Living Will?: (!) No    Intervention:  Living will provided                     Objective   Vision Screening    Right eye Left eye Both eyes   Without correction      With correction 20/20 20/20 20/20      There were no vitals filed for this visit.   There is no height or weight on file to calculate BMI.                  Allergies   Allergen Reactions    Codeine Hives and Swelling     \"tylenol with codeine is what I had trouble with\"      Prior to Visit Medications    Medication Sig Taking? Authorizing Provider   losartan (COZAAR) 100 MG tablet Take 1 tablet by mouth daily Yes Baetris Kay MD   atenolol (TENORMIN) 100 MG tablet Take 1 tablet by mouth daily Yes Beatris Kay MD   pregabalin (LYRICA) 50 MG capsule Take 1 capsule by mouth 3 times daily for 90 doses. Max Daily Amount: 150 mg  Patient taking differently: Take 1 capsule by mouth 1 time. Yes Beatris Kay MD   atorvastatin (LIPITOR) 40 MG

## 2025-02-10 NOTE — PATIENT INSTRUCTIONS

## 2025-02-23 NOTE — PROGRESS NOTES
Patient Name: Veronica Garcia  Patient : 1950  Patient MRN: [id number]     Primary Oncologist: Isra Potts MD  Referring Provider: Beatris Kay MD     Date of Service: 2025      Chief Complaint:   Chief Complaint   Patient presents with    Follow-up     Patient Active Problem List:     Malignant neoplasm of female breast      Postoperative nausea     Neutropenic fever      Alopecia (capitis) totalis    HPI:   Ms. Garcia is a 74-year-old very pleasant female with medical history significant for hypertension, hyperlipidemia, coronary artery disease, initially referred to me on 2020 for evaluation of his clinical stage IIA left breast, high grade, triple negative, invasive ductal carcinoma.     She stated that she has echocardiogram and stress test on 2020. It showed normal EF (EF 51%). Concentric left ventricular hypertrophy and she has mild to moderate mitral regurfitation. She was incidentally noted to have left breast mass.     She was subsequently evaluated by Dr. Kay and she requested diagnostic mammogram. She underwent diagnostic digital bilateral mammogram on 2020 and it showed dumbbell-shaped mass at 2-3 o'clock at middle to posterior depth with associated pleomorphic calcifications, in her left breast. Targeted ultrasound at 2 o'clock at a distance of 7 cm from the nipple demonstrates a dumbbell-shaped hypoechoic mass with angular margins that measures 3.3 x 2.5 x 2.1 cm.  Internal vascularity noted along with some posterior acoustic shadowing.  No suspicious axillary lymph nodes.    She underwent ultrasound guided core biopsy of left breast and clip marker placement on 2020 and final pathology showed invasive ductal carcinoma. Tumor size is at least 12 mm and it is a high grade tumor. ER by IHC is negative (0%), PgR by IHC is negative (0%), Her2 by IHC is equivocal (score 2+) and Her2 by FISH is negative.     Since she is found to have clinical

## 2025-02-25 ENCOUNTER — HOSPITAL ENCOUNTER (OUTPATIENT)
Dept: INFUSION THERAPY | Age: 75
Discharge: HOME | End: 2025-02-25
Payer: MEDICARE

## 2025-02-25 ENCOUNTER — OFFICE VISIT (OUTPATIENT)
Dept: ONCOLOGY | Age: 75
End: 2025-02-25
Payer: MEDICARE

## 2025-02-25 VITALS
DIASTOLIC BLOOD PRESSURE: 68 MMHG | WEIGHT: 193.8 LBS | TEMPERATURE: 97.2 F | OXYGEN SATURATION: 99 % | HEART RATE: 58 BPM | BODY MASS INDEX: 33.09 KG/M2 | SYSTOLIC BLOOD PRESSURE: 135 MMHG | HEIGHT: 64 IN

## 2025-02-25 DIAGNOSIS — Z17.1 MALIGNANT NEOPLASM OF UPPER-OUTER QUADRANT OF LEFT BREAST IN FEMALE, ESTROGEN RECEPTOR NEGATIVE (HCC): Primary | ICD-10-CM

## 2025-02-25 DIAGNOSIS — C50.412 MALIGNANT NEOPLASM OF UPPER-OUTER QUADRANT OF LEFT BREAST IN FEMALE, ESTROGEN RECEPTOR NEGATIVE (HCC): Primary | ICD-10-CM

## 2025-02-25 PROCEDURE — 99213 OFFICE O/P EST LOW 20 MIN: CPT | Performed by: INTERNAL MEDICINE

## 2025-02-25 PROCEDURE — 1126F AMNT PAIN NOTED NONE PRSNT: CPT | Performed by: INTERNAL MEDICINE

## 2025-02-25 PROCEDURE — 3075F SYST BP GE 130 - 139MM HG: CPT | Performed by: INTERNAL MEDICINE

## 2025-02-25 PROCEDURE — 1123F ACP DISCUSS/DSCN MKR DOCD: CPT | Performed by: INTERNAL MEDICINE

## 2025-02-25 PROCEDURE — 99212 OFFICE O/P EST SF 10 MIN: CPT

## 2025-02-25 PROCEDURE — 1159F MED LIST DOCD IN RCRD: CPT | Performed by: INTERNAL MEDICINE

## 2025-02-25 PROCEDURE — 3078F DIAST BP <80 MM HG: CPT | Performed by: INTERNAL MEDICINE

## 2025-02-25 NOTE — PROGRESS NOTES
MA Rooming Questions  Patient: Veronica Garcia  MRN: [id number]    Date: 2/25/2025        1. Do you have any new issues?   no         2. Do you need any refills on medications?    no    3. Have you had any imaging done since your last visit?   yes - Dexa 11/19 and  mammo 10/18    4. Have you been hospitalized or seen in the emergency room since your last visit here?   no    5. Did the patient have a depression screening completed today? No    No data recorded     PHQ-9 Given to (if applicable):               PHQ-9 Score (if applicable):                     [] Positive     []  Negative              Does question #9 need addressed (if applicable)                     [] Yes    []  No               Christiana Deleon MA

## 2025-02-28 LAB
AO ROOT DIAM: NORMAL
AV MG: NORMAL
AV V1 MAX: NORMAL
AV V2 MAX: NORMAL
AVA DOPPLER: NORMAL
ECHO AV PEAK GRADIENT: NORMAL
ECHO AV VTI: NORMAL
ECHO LV INTERNAL DIMENSION DIASTOLIC: NORMAL
ECHO LV INTERNAL DIMENSION SYSTOLIC: NORMAL
ECHO LV POSTERIOR WALL DIASTOLIC: NORMAL
ECHO LVOT DIAM: NORMAL
ECHO MV A VELOCITY: NORMAL
ECHO MV AREA PHT: NORMAL
ECHO MV E VELOCITY: NORMAL
ECHO MV E/A RATIO: NORMAL
ECHO MV MEAN GRADIENT: NORMAL
ECHO PVEIN S/D RATIO: NORMAL
ECHO RV INTERNAL DIMENSION: NORMAL
IVC-%COLLAPSE: NORMAL
IVC: NORMAL
IVSD (M-MODE): NORMAL
IVSD: NORMAL
LAD 2D: NORMAL
LAD M-MODE: NORMAL
LEFT VENTRICULAR EJECTION FRACTION MODE: NORMAL
LV EF: NORMAL %
LV MASS (M-MODE): NORMAL
LV MASS/BSA: NORMAL
LVIDD (M-MODE): NORMAL
LVIDS (M-MODE): NORMAL
LVPWD (M-MODE): NORMAL
LVPWD 2D: NORMAL
LVSV: NORMAL
MR VELOCITY: NORMAL
MR VTI: NORMAL
MV A POINT: NORMAL
MV D: NORMAL
MV DT: NORMAL
MV EPSS: NORMAL
MV PG: NORMAL
MV PHT: NORMAL
MV VTI  V2: NORMAL
MVA: NORMAL
PA DIAST PRESS: NORMAL
PISA MR VOL: NORMAL
PISA MV REG ALIAS VEL: NORMAL
RAP: NORMAL
RVIDD M-MODE: NORMAL
RVSP ESTIMATE: NORMAL
TV RPFV: NORMAL

## 2025-03-24 ENCOUNTER — OFFICE VISIT (OUTPATIENT)
Dept: FAMILY MEDICINE CLINIC | Age: 75
End: 2025-03-24
Payer: MEDICARE

## 2025-03-24 VITALS
WEIGHT: 197 LBS | BODY MASS INDEX: 33.81 KG/M2 | HEART RATE: 62 BPM | OXYGEN SATURATION: 99 % | SYSTOLIC BLOOD PRESSURE: 118 MMHG | DIASTOLIC BLOOD PRESSURE: 76 MMHG

## 2025-03-24 DIAGNOSIS — G62.0 CHEMOTHERAPY-INDUCED NEUROPATHY: Primary | ICD-10-CM

## 2025-03-24 DIAGNOSIS — T45.1X5A CHEMOTHERAPY-INDUCED NEUROPATHY: Primary | ICD-10-CM

## 2025-03-24 DIAGNOSIS — Z23 NEED FOR COVID-19 VACCINE: ICD-10-CM

## 2025-03-24 DIAGNOSIS — Z29.11 NEED FOR PROPHYLACTIC VACCINATION AND INOCULATION AGAINST RESPIRATORY SYNCYTIAL VIRUS (RSV): ICD-10-CM

## 2025-03-24 PROCEDURE — 3078F DIAST BP <80 MM HG: CPT | Performed by: FAMILY MEDICINE

## 2025-03-24 PROCEDURE — 3074F SYST BP LT 130 MM HG: CPT | Performed by: FAMILY MEDICINE

## 2025-03-24 PROCEDURE — 1123F ACP DISCUSS/DSCN MKR DOCD: CPT | Performed by: FAMILY MEDICINE

## 2025-03-24 PROCEDURE — 99213 OFFICE O/P EST LOW 20 MIN: CPT | Performed by: FAMILY MEDICINE

## 2025-03-24 PROCEDURE — 1159F MED LIST DOCD IN RCRD: CPT | Performed by: FAMILY MEDICINE

## 2025-03-24 NOTE — PROGRESS NOTES
Patient ID: Veronica Garcia [id number]    .  Chief Complaint   Patient presents with    Peripheral Neuropathy         Neuropathy        Peripheral neuropathy: Due to chemotherapy.  Cymbalta did not work.  She does not think the Lyrica is helping either.  It does make her sleepy and so she only takes it once a day instead of 3 times a day.  She has just completed her 5-year breast cancer treatment.    Patient Active Problem List   Diagnosis    Essential hypertension    Hyperlipidemia    History of colon polyps    Obesity (BMI 30.0-34.9)    Osteopenia of multiple sites    Coronary artery disease involving native coronary artery of native heart without angina pectoris    Hepatic steatosis    Malignant neoplasm of upper-outer quadrant of left breast in female, estrogen receptor negative (HCC)    Postoperative nausea    Alopecia (capitis) totalis    Diarrhea due to drug    Chemotherapy-induced neuropathy    LBBB (left bundle branch block)    Angina pectoris, unspecified    Atherosclerotic heart disease of native coronary artery with unspecified angina pectoris         Current Outpatient Medications on File Prior to Visit   Medication Sig Dispense Refill    losartan (COZAAR) 100 MG tablet Take 1 tablet by mouth daily 90 tablet 1    atenolol (TENORMIN) 100 MG tablet Take 1 tablet by mouth daily 90 tablet 1    atorvastatin (LIPITOR) 40 MG tablet Take 1 tablet by mouth daily To replace the 10 mg 90 tablet 3    Caltrate 600+D Plus Minerals (CALTRATE) 600-800 MG-UNIT TABS tablet Take 1 tablet by mouth 2 times daily 180 tablet 3    aspirin 81 MG EC tablet Take 1 tablet by mouth daily 90 tablet 3    nitroGLYCERIN (NITROSTAT) 0.4 MG SL tablet Place 1 tablet under the tongue every 5 minutes as needed for Chest pain up to max of 3 total doses. If no relief after 1 dose, call 911. 25 tablet 1    Omega-3 Fatty Acids (FISH OIL PO) Take 1 tablet by mouth daily      Probiotic Product (ALIGN PO) Take 1 tablet by mouth daily      vitamin

## 2025-06-16 ENCOUNTER — HOSPITAL ENCOUNTER (OUTPATIENT)
Dept: ULTRASOUND IMAGING | Age: 75
End: 2025-06-16
Attending: INTERNAL MEDICINE
Payer: MEDICARE

## 2025-06-16 ENCOUNTER — HOSPITAL ENCOUNTER (OUTPATIENT)
Dept: WOMENS IMAGING | Age: 75
Discharge: HOME | End: 2025-06-16
Attending: INTERNAL MEDICINE
Payer: MEDICARE

## 2025-06-16 VITALS — HEIGHT: 64 IN | BODY MASS INDEX: 31.41 KG/M2 | WEIGHT: 184 LBS

## 2025-06-16 DIAGNOSIS — Z17.1 MALIGNANT NEOPLASM OF UPPER-OUTER QUADRANT OF LEFT BREAST IN FEMALE, ESTROGEN RECEPTOR NEGATIVE (HCC): ICD-10-CM

## 2025-06-16 DIAGNOSIS — C50.412 MALIGNANT NEOPLASM OF UPPER-OUTER QUADRANT OF LEFT BREAST IN FEMALE, ESTROGEN RECEPTOR NEGATIVE (HCC): ICD-10-CM

## 2025-06-16 PROCEDURE — 77065 DX MAMMO INCL CAD UNI: CPT

## 2025-07-15 ENCOUNTER — OFFICE VISIT (OUTPATIENT)
Dept: FAMILY MEDICINE CLINIC | Age: 75
End: 2025-07-15
Payer: MEDICARE

## 2025-07-15 VITALS
SYSTOLIC BLOOD PRESSURE: 122 MMHG | HEIGHT: 64 IN | HEART RATE: 52 BPM | OXYGEN SATURATION: 95 % | BODY MASS INDEX: 32.49 KG/M2 | WEIGHT: 190.3 LBS | DIASTOLIC BLOOD PRESSURE: 70 MMHG

## 2025-07-15 DIAGNOSIS — E78.5 HYPERLIPIDEMIA, UNSPECIFIED HYPERLIPIDEMIA TYPE: ICD-10-CM

## 2025-07-15 DIAGNOSIS — I25.119 ATHEROSCLEROSIS OF NATIVE CORONARY ARTERY OF NATIVE HEART WITH ANGINA PECTORIS: Primary | ICD-10-CM

## 2025-07-15 DIAGNOSIS — T45.1X5A CHEMOTHERAPY-INDUCED NEUROPATHY: ICD-10-CM

## 2025-07-15 DIAGNOSIS — G62.0 CHEMOTHERAPY-INDUCED NEUROPATHY: ICD-10-CM

## 2025-07-15 DIAGNOSIS — I10 ESSENTIAL HYPERTENSION: ICD-10-CM

## 2025-07-15 DIAGNOSIS — E66.811 OBESITY (BMI 30.0-34.9): ICD-10-CM

## 2025-07-15 DIAGNOSIS — M85.89 OSTEOPENIA OF MULTIPLE SITES: ICD-10-CM

## 2025-07-15 DIAGNOSIS — I25.10 CORONARY ARTERY DISEASE INVOLVING NATIVE CORONARY ARTERY OF NATIVE HEART WITHOUT ANGINA PECTORIS: ICD-10-CM

## 2025-07-15 LAB
ALBUMIN SERPL-MCNC: 4.5 G/DL (ref 3.4–5)
ALBUMIN/GLOB SERPL: 1.7 {RATIO} (ref 1.1–2.2)
ALP SERPL-CCNC: 55 U/L (ref 40–129)
ALT SERPL-CCNC: 39 U/L (ref 10–40)
ANION GAP SERPL CALCULATED.3IONS-SCNC: 13 MMOL/L (ref 3–16)
AST SERPL-CCNC: 37 U/L (ref 15–37)
BILIRUB SERPL-MCNC: 1.4 MG/DL (ref 0–1)
BUN SERPL-MCNC: 19 MG/DL (ref 7–20)
CALCIUM SERPL-MCNC: 10.3 MG/DL (ref 8.3–10.6)
CHLORIDE SERPL-SCNC: 103 MMOL/L (ref 99–110)
CHOLEST SERPL-MCNC: 146 MG/DL (ref 0–199)
CO2 SERPL-SCNC: 24 MMOL/L (ref 21–32)
CREAT SERPL-MCNC: 0.9 MG/DL (ref 0.6–1.2)
GFR SERPLBLD CREATININE-BSD FMLA CKD-EPI: 66 ML/MIN/{1.73_M2}
GLUCOSE SERPL-MCNC: 92 MG/DL (ref 70–99)
HDLC SERPL-MCNC: 65 MG/DL (ref 40–60)
LDLC SERPL CALC-MCNC: 53 MG/DL
POTASSIUM SERPL-SCNC: 4.5 MMOL/L (ref 3.5–5.1)
PROT SERPL-MCNC: 7.1 G/DL (ref 6.4–8.2)
SODIUM SERPL-SCNC: 140 MMOL/L (ref 136–145)
T4 FREE SERPL-MCNC: 0.9 NG/DL (ref 0.9–1.8)
TRIGL SERPL-MCNC: 142 MG/DL (ref 0–150)
TSH SERPL DL<=0.005 MIU/L-ACNC: 14.3 UIU/ML (ref 0.27–4.2)
VLDLC SERPL CALC-MCNC: 28 MG/DL

## 2025-07-15 PROCEDURE — 3078F DIAST BP <80 MM HG: CPT | Performed by: FAMILY MEDICINE

## 2025-07-15 PROCEDURE — 36415 COLL VENOUS BLD VENIPUNCTURE: CPT | Performed by: FAMILY MEDICINE

## 2025-07-15 PROCEDURE — 1159F MED LIST DOCD IN RCRD: CPT | Performed by: FAMILY MEDICINE

## 2025-07-15 PROCEDURE — 3074F SYST BP LT 130 MM HG: CPT | Performed by: FAMILY MEDICINE

## 2025-07-15 PROCEDURE — 99214 OFFICE O/P EST MOD 30 MIN: CPT | Performed by: FAMILY MEDICINE

## 2025-07-15 PROCEDURE — 1123F ACP DISCUSS/DSCN MKR DOCD: CPT | Performed by: FAMILY MEDICINE

## 2025-07-15 RX ORDER — ASPIRIN 81 MG/1
81 TABLET ORAL DAILY
Qty: 90 TABLET | Refills: 3 | Status: SHIPPED | OUTPATIENT
Start: 2025-07-15

## 2025-07-15 RX ORDER — ATENOLOL 100 MG/1
100 TABLET ORAL DAILY
Qty: 90 TABLET | Refills: 1 | Status: SHIPPED | OUTPATIENT
Start: 2025-07-15

## 2025-07-15 RX ORDER — LOSARTAN POTASSIUM 100 MG/1
100 TABLET ORAL DAILY
Qty: 90 TABLET | Refills: 1 | Status: SHIPPED | OUTPATIENT
Start: 2025-07-15

## 2025-07-15 RX ORDER — CAL/D3/MAG11/ZINC/COP/MANG/BOR 600 MG-800
1 TABLET ORAL 2 TIMES DAILY
Qty: 180 TABLET | Refills: 3 | Status: SHIPPED | OUTPATIENT
Start: 2025-07-15

## 2025-07-15 RX ORDER — NITROGLYCERIN 0.4 MG/1
0.4 TABLET SUBLINGUAL EVERY 5 MIN PRN
Qty: 25 TABLET | Refills: 1 | Status: SHIPPED | OUTPATIENT
Start: 2025-07-15

## 2025-07-15 RX ORDER — ATORVASTATIN CALCIUM 40 MG/1
40 TABLET, FILM COATED ORAL DAILY
Qty: 90 TABLET | Refills: 3 | Status: SHIPPED | OUTPATIENT
Start: 2025-07-15

## 2025-07-15 NOTE — PATIENT INSTRUCTIONS
Learning About Eating More Fruits and Vegetables  What are some quick tips for eating more fruits and vegetables?    We're all encouraged to eat more fruits and vegetables. Yet it can seem like one more chore on the daily to-do list. But you can add color and crunch to your meals--and lots of nutrition--with these quick tips.  Brighten up your breakfast.  Add sliced fruit or frozen berries to your yogurt, pancakes, or cereal.  Blend fresh or frozen fruit, veggies, and yogurt with a little fruit juice, and you've got a tasty smoothie.  Make your scrambled eggs a gourmet treat by adding onions, celery, and bell peppers.  Bake up some bran muffins with grated carrots added into the mix.  Make a livelier lunch.  Jazz up tuna or chicken salad with apple chunks, celery, or grapes--or all of them!  Add cucumbers, avocado slices, tomatoes, and lettuce to your sandwiches.  Kick up the flavor of grilled cheese sandwiches with spinach and tomatoes.  Puree some potatoes or squash to add to tomato soup.  Add delicious veggies to dinner.  Give more color and taste to salads. Stir in red cabbage, carrots, and bell peppers. Top salads with dried cranberries or raisins. \"Frost\" your salad with orange sections or strawberries.  Keep a bag or two of frozen vegetables ready to pull out of the freezer for a side dish.  Spice up spaghetti and meatballs with mushrooms and bell peppers.  Roast vegetables like cauliflower or squash in the oven with olive oil to bring out their flavor.  Season your veggie dish with herbs like basil and eyad and a splash of lemon juice and olive oil.  If you've got a main dish in the oven, stick in a potato to round out your meal.  Grab some healthy snacks on the go.  Scoop up an apple, banana, or plum for a quick snack.  Cut up raw fruits and veggies to keep in your fridge. Grapes, oranges, carrots, and celery are great choices. They'll be ready for a quick snack or an after-school treat.  Dip raw vegetables

## 2025-07-17 ENCOUNTER — OFFICE VISIT (OUTPATIENT)
Dept: FAMILY MEDICINE CLINIC | Age: 75
End: 2025-07-17

## 2025-07-17 VITALS
DIASTOLIC BLOOD PRESSURE: 80 MMHG | WEIGHT: 189.6 LBS | BODY MASS INDEX: 33.06 KG/M2 | OXYGEN SATURATION: 97 % | SYSTOLIC BLOOD PRESSURE: 132 MMHG | HEART RATE: 51 BPM

## 2025-07-17 DIAGNOSIS — E03.9 HYPOTHYROIDISM, UNSPECIFIED TYPE: Primary | ICD-10-CM

## 2025-07-17 RX ORDER — LEVOTHYROXINE SODIUM 50 UG/1
50 TABLET ORAL DAILY
Qty: 30 TABLET | Refills: 1 | Status: SHIPPED | OUTPATIENT
Start: 2025-07-17

## 2025-07-17 NOTE — PROGRESS NOTES
Patient ID: Veronica Garcia 1950    Chief Complaint   Patient presents with    Thyroid Problem     thyroid is abnormal here to discuss        Thyroid Problem         History of Present Illness  The patient is a 74-year-old female who presents to review thyroid lab results.    Weight Loss  - Experiencing weight loss  - Current weight 189 lbs    Reports no family history of thyroid issues.    FAMILY HISTORY  She reports no known family history of thyroid issues.      Patient Active Problem List   Diagnosis    Essential hypertension    Hyperlipidemia    History of colon polyps    Obesity (BMI 30.0-34.9)    Osteopenia of multiple sites    Coronary artery disease involving native coronary artery of native heart without angina pectoris    Hepatic steatosis    Malignant neoplasm of upper-outer quadrant of left breast in female, estrogen receptor negative (HCC)    Postoperative nausea    Alopecia (capitis) totalis    Diarrhea due to drug    Chemotherapy-induced neuropathy    LBBB (left bundle branch block)    Angina pectoris, unspecified    Atherosclerotic heart disease of native coronary artery with unspecified angina pectoris       Past Surgical History:   Procedure Laterality Date    BREAST BIOPSY Left 2020    BREAST BIOPSY Left 2020    sentinal node, partial mastectomy left    BREAST LUMPECTOMY Left 3/20/2020    LEFT BREAST LUMPECTOMY WITH NEEDLE LOC AND SENTINEL NODE BIOPSY performed by Leatha Brooks MD at [facility] OR    BREAST SURGERY Left 3/30/2020    BREAST LESION RE EXCISION LEFT LUMPECTOMY SITE performed by Leatha Brooks MD at [facility] OR    CARDIOVASCULAR STRESS TEST  2009    treadmill, Dr. Galindo    CARPAL TUNNEL RELEASE Right 's    right     SECTION      COLONOSCOPY          COLONOSCOPY  2015    diverticulosis, internal hemorroids. repeat 5 years. /[place]    PORT SURGERY Right 2020    PORT SURGERY Right 3/20/2020    PORT INSERTION performed

## 2025-07-17 NOTE — PATIENT INSTRUCTIONS
BRING YOUR MEDICATION BOTTLES TO ALL APPOINTMENTS    Check MY CHART for test results.  If you have forgotten your password, call [phone].  The diagnoses and medications listed in this after visit summary may not be up to date.  Check MY CHART in 28-48 hours for corrections.      Late cancellation policy: So that we can better accommodate people who are sick, please give our office 24 hour notice for an appointment cancellation.    Missed appointments: Your care is very important to us.  It is important that you keep your scheduled appointments.   Multiple missed appointments will lead to a dismissal from the office.    Patients arriving late will be worked into the schedule as time permits, with patients arriving on time taken as scheduled. Late arriving patients are more than welcome to wait or reschedule their appointments.    Please allow 5-7 business days for paperwork to be processed.

## 2025-08-27 ENCOUNTER — OFFICE VISIT (OUTPATIENT)
Dept: ONCOLOGY | Age: 75
End: 2025-08-27
Payer: MEDICARE

## 2025-08-27 ENCOUNTER — HOSPITAL ENCOUNTER (OUTPATIENT)
Dept: INFUSION THERAPY | Age: 75
Discharge: HOME | End: 2025-08-27
Payer: MEDICARE

## 2025-08-27 VITALS
OXYGEN SATURATION: 100 % | RESPIRATION RATE: 16 BRPM | BODY MASS INDEX: 31.79 KG/M2 | DIASTOLIC BLOOD PRESSURE: 69 MMHG | SYSTOLIC BLOOD PRESSURE: 166 MMHG | WEIGHT: 186.2 LBS | TEMPERATURE: 97.7 F | HEIGHT: 64 IN | HEART RATE: 48 BPM

## 2025-08-27 DIAGNOSIS — C50.412 MALIGNANT NEOPLASM OF UPPER-OUTER QUADRANT OF LEFT BREAST IN FEMALE, ESTROGEN RECEPTOR NEGATIVE (HCC): Primary | ICD-10-CM

## 2025-08-27 DIAGNOSIS — Z17.1 MALIGNANT NEOPLASM OF UPPER-OUTER QUADRANT OF LEFT BREAST IN FEMALE, ESTROGEN RECEPTOR NEGATIVE (HCC): Primary | ICD-10-CM

## 2025-08-27 PROCEDURE — 99213 OFFICE O/P EST LOW 20 MIN: CPT | Performed by: INTERNAL MEDICINE

## 2025-08-27 PROCEDURE — 1123F ACP DISCUSS/DSCN MKR DOCD: CPT | Performed by: INTERNAL MEDICINE

## 2025-08-27 PROCEDURE — 3077F SYST BP >= 140 MM HG: CPT | Performed by: INTERNAL MEDICINE

## 2025-08-27 PROCEDURE — 99212 OFFICE O/P EST SF 10 MIN: CPT

## 2025-08-27 PROCEDURE — 3078F DIAST BP <80 MM HG: CPT | Performed by: INTERNAL MEDICINE

## 2025-08-27 PROCEDURE — 1159F MED LIST DOCD IN RCRD: CPT | Performed by: INTERNAL MEDICINE

## 2025-08-27 ASSESSMENT — PATIENT HEALTH QUESTIONNAIRE - PHQ9
1. LITTLE INTEREST OR PLEASURE IN DOING THINGS: NOT AT ALL
SUM OF ALL RESPONSES TO PHQ QUESTIONS 1-9: 0
2. FEELING DOWN, DEPRESSED OR HOPELESS: NOT AT ALL
SUM OF ALL RESPONSES TO PHQ QUESTIONS 1-9: 0

## 2025-08-28 ENCOUNTER — LAB (OUTPATIENT)
Dept: FAMILY MEDICINE CLINIC | Age: 75
End: 2025-08-28
Payer: MEDICARE

## 2025-08-28 DIAGNOSIS — E03.9 HYPOTHYROIDISM, UNSPECIFIED TYPE: Primary | ICD-10-CM

## 2025-08-28 LAB — TSH SERPL DL<=0.005 MIU/L-ACNC: 2.69 UIU/ML (ref 0.27–4.2)

## 2025-08-28 PROCEDURE — 36415 COLL VENOUS BLD VENIPUNCTURE: CPT | Performed by: FAMILY MEDICINE

## 2025-09-02 DIAGNOSIS — E03.9 HYPOTHYROIDISM, UNSPECIFIED TYPE: ICD-10-CM

## 2025-09-02 RX ORDER — LEVOTHYROXINE SODIUM 50 UG/1
50 TABLET ORAL DAILY
Qty: 90 TABLET | Refills: 1 | Status: SHIPPED | OUTPATIENT
Start: 2025-09-02

## (undated) DEVICE — SUTURE VCRL SZ 5-0 L18IN ABSRB UD L13MM P-3 3/8 CIR PRIM J493G

## (undated) DEVICE — PENCIL ES CRD L10FT HND SWCHING ROCK SWCH W/ EDGE COAT BLDE

## (undated) DEVICE — COVER,TABLE,44X90,STERILE: Brand: MEDLINE

## (undated) DEVICE — PACK,BASIC,IX: Brand: MEDLINE

## (undated) DEVICE — GAUZE,SPONGE,4"X4",16PLY,XRAY,STRL,LF: Brand: MEDLINE

## (undated) DEVICE — DRAPE,T,LAPARO,TRANS,STERILE: Brand: MEDLINE

## (undated) DEVICE — GLOVE SURG SZ 65 THK91MIL LTX FREE SYN POLYISOPRENE

## (undated) DEVICE — DEVICE SUT FOR TRCR SITE INCIS ENDO CLOSE

## (undated) DEVICE — LINER,SEMI-RIGID,3000CC,50EA/CS: Brand: MEDLINE

## (undated) DEVICE — GARMENT,MEDLINE,DVT,INT,CALF,MED, GEN2: Brand: MEDLINE

## (undated) DEVICE — SPONGE LAP W18XL18IN WHT COT 4 PLY FLD STRUNG RADPQ DISP ST

## (undated) DEVICE — DRAPE SHEET ULTRAGARD: Brand: MEDLINE

## (undated) DEVICE — PRESSURE TUBING: Brand: TRUWAVE

## (undated) DEVICE — ATTACHMENT SMK EVAC FOR ES PNCL ACCUVAC

## (undated) DEVICE — NEEDLE HYPO 23GA L1.5IN TURQ POLYPR HUB S STL THN WALL IM

## (undated) DEVICE — TUBING, SUCTION, 9/32" X 10', STRAIGHT: Brand: MEDLINE

## (undated) DEVICE — INTENDED FOR TISSUE SEPARATION, AND OTHER PROCEDURES THAT REQUIRE A SHARP SURGICAL BLADE TO PUNCTURE OR CUT.: Brand: BARD-PARKER ® STAINLESS STEEL BLADES

## (undated) DEVICE — GOWN,SIRUS,POLYRNF,BRTHSLV,XLN/XL,20/CS: Brand: MEDLINE

## (undated) DEVICE — GLOVE SURG SZ 8 CRM LTX FREE POLYISOPRENE POLYMER BEAD ANTI

## (undated) DEVICE — SYRINGE MED 10ML LUERLOCK TIP W/O SFTY DISP

## (undated) DEVICE — SUTURE VCRL SZ 3-0 L27IN ABSRB UD L17MM RB-1 1/2 CIR J215H

## (undated) DEVICE — YANKAUER,FLEXIBLE HANDLE,REGLR CAPACITY: Brand: MEDLINE INDUSTRIES, INC.

## (undated) DEVICE — SOLUTION IV IRRIG POUR BRL 0.9% SODIUM CHL 2F7124

## (undated) DEVICE — COVER,C-ARM,41X74: Brand: MEDLINE

## (undated) DEVICE — Z DUP USE 2257490 ADHESIVE SKIN CLSRE 036ML TPCL 2CTL CNCRLTE HIGH VSCSTY DRMB

## (undated) DEVICE — ELECTRODE ES AD CRDLSS PT RET REM POLYHESIVE

## (undated) DEVICE — SUTURE PERMA HND 2-0 L18IN NONABSORBABLE BLK X-1 L22IN 1/2 737G

## (undated) DEVICE — TUBING, SUCTION, 3/16" X 10', STRAIGHT: Brand: MEDLINE

## (undated) DEVICE — SUTURE PERMAHAND SZ 3-0 L18IN NONABSORBABLE BLK L26MM SH C013D

## (undated) DEVICE — STOPCOCK IV HI PRSS 1050PSI NDLLSS INJ 3 W LUER SWVL NUT

## (undated) DEVICE — SUTURE VCRL SZ 2-0 L27IN ABSRB UD L26MM SH 1/2 CIR J417H

## (undated) DEVICE — STERILE LATEX POWDER-FREE SURGICAL GLOVESWITH NITRILE COATING: Brand: PROTEXIS

## (undated) DEVICE — TOWEL,OR,DSP,ST,BLUE,STD,6/PK,12PK/CS: Brand: MEDLINE

## (undated) DEVICE — CONTAINER,SPECIMEN,OR STERILE,4OZ: Brand: MEDLINE

## (undated) DEVICE — ELECTRODE ES L2.75IN S STL INSUL BLDE W/ SL EDGE

## (undated) DEVICE — GLOVE SURG SZ 65 CRM LTX FREE POLYISOPRENE POLYMER BEAD ANTI

## (undated) DEVICE — CHLORAPREP 26ML ORANGE

## (undated) DEVICE — COVER,MAYO STAND,STERILE: Brand: MEDLINE

## (undated) DEVICE — GOWN,ECLIPSE,POLYRNF,BRTHSLV,L,30/CS: Brand: MEDLINE

## (undated) DEVICE — SUTURE PROL SZ 2-0 L30IN NONABSORBABLE BLU L26MM SH 1/2 CIR 8833H

## (undated) DEVICE — SOLUTION IV IRRIG WATER 1000ML POUR BRL 2F7114

## (undated) DEVICE — STAPLER INT L55MM CUT LN L53MM STPL LN L57MM BLU B FRM 8

## (undated) DEVICE — DECANTER FLD 9IN ST BG FOR ASEP TRNSF OF FLD

## (undated) DEVICE — RELOAD STPL L55MM OPN H3.8MM CLS H1.5MM WIRE DIA0.2MM REG

## (undated) DEVICE — MARKER SURG MARGIN STD 6 CLR INK ASST CORR CLP

## (undated) DEVICE — MARKER SURG SKIN UTIL REGULAR/FINE 2 TIP W/ RUL AND 9 LBL

## (undated) DEVICE — RETRACTOR SURG WIDE FLAT LO PROF LTWT PHOTONGUIDE

## (undated) DEVICE — COUNTER NDL 30 COUNT FOAM STRP SGL MAG